# Patient Record
Sex: MALE | Race: WHITE | NOT HISPANIC OR LATINO | Employment: FULL TIME | URBAN - METROPOLITAN AREA
[De-identification: names, ages, dates, MRNs, and addresses within clinical notes are randomized per-mention and may not be internally consistent; named-entity substitution may affect disease eponyms.]

---

## 2020-01-17 ENCOUNTER — OFFICE VISIT CONVERTED (OUTPATIENT)
Dept: CARDIOLOGY | Facility: CLINIC | Age: 55
End: 2020-01-17
Attending: INTERNAL MEDICINE

## 2020-01-17 ENCOUNTER — CONVERSION ENCOUNTER (OUTPATIENT)
Dept: CARDIOLOGY | Facility: CLINIC | Age: 55
End: 2020-01-17
Attending: INTERNAL MEDICINE

## 2020-02-14 ENCOUNTER — OFFICE VISIT CONVERTED (OUTPATIENT)
Dept: FAMILY MEDICINE CLINIC | Facility: CLINIC | Age: 55
End: 2020-02-14
Attending: INTERNAL MEDICINE

## 2020-02-14 ENCOUNTER — HOSPITAL ENCOUNTER (OUTPATIENT)
Dept: GENERAL RADIOLOGY | Facility: HOSPITAL | Age: 55
Discharge: HOME | End: 2020-02-14
Attending: INTERNAL MEDICINE

## 2020-02-15 ENCOUNTER — HOSPITAL ENCOUNTER (OUTPATIENT)
Dept: OTHER | Facility: HOSPITAL | Age: 55
Discharge: HOME | End: 2020-02-15
Attending: INTERNAL MEDICINE

## 2020-02-15 LAB
ALBUMIN SERPL-MCNC: 4.2 G/DL (ref 3.5–5)
ALBUMIN/GLOB SERPL: 1.3 {RATIO} (ref 1.4–2.6)
ALP SERPL-CCNC: 83 U/L (ref 56–119)
ALT SERPL-CCNC: 16 U/L (ref 10–40)
ANION GAP SERPL CALC-SCNC: 16 MMOL/L (ref 8–19)
AST SERPL-CCNC: 16 U/L (ref 15–50)
BASOPHILS # BLD AUTO: 0.06 10*3/UL (ref 0–0.2)
BASOPHILS NFR BLD AUTO: 0.6 % (ref 0–3)
BILIRUB SERPL-MCNC: 0.53 MG/DL (ref 0.2–1.3)
BUN SERPL-MCNC: 18 MG/DL (ref 5–25)
BUN/CREAT SERPL: 15 {RATIO} (ref 6–20)
CALCIUM SERPL-MCNC: 9.3 MG/DL (ref 8.7–10.4)
CHLORIDE SERPL-SCNC: 99 MMOL/L (ref 99–111)
CHOLEST SERPL-MCNC: 252 MG/DL (ref 107–200)
CHOLEST/HDLC SERPL: 8.7 {RATIO} (ref 3–6)
CONV ABS IMM GRAN: 0.02 10*3/UL (ref 0–0.2)
CONV CO2: 25 MMOL/L (ref 22–32)
CONV IMMATURE GRAN: 0.2 % (ref 0–1.8)
CONV TOTAL PROTEIN: 7.4 G/DL (ref 6.3–8.2)
CREAT UR-MCNC: 1.23 MG/DL (ref 0.7–1.2)
DEPRECATED RDW RBC AUTO: 40.6 FL (ref 35.1–43.9)
EOSINOPHIL # BLD AUTO: 0.15 10*3/UL (ref 0–0.7)
EOSINOPHIL # BLD AUTO: 1.4 % (ref 0–7)
ERYTHROCYTE [DISTWIDTH] IN BLOOD BY AUTOMATED COUNT: 13.1 % (ref 11.6–14.4)
EST. AVERAGE GLUCOSE BLD GHB EST-MCNC: 111 MG/DL
GFR SERPLBLD BASED ON 1.73 SQ M-ARVRAT: >60 ML/MIN/{1.73_M2}
GLOBULIN UR ELPH-MCNC: 3.2 G/DL (ref 2–3.5)
GLUCOSE SERPL-MCNC: 85 MG/DL (ref 70–99)
HBA1C MFR BLD: 5.5 % (ref 3.5–5.7)
HCT VFR BLD AUTO: 47 % (ref 42–52)
HDLC SERPL-MCNC: 29 MG/DL (ref 40–60)
HGB BLD-MCNC: 15.9 G/DL (ref 14–18)
LDLC SERPL CALC-MCNC: 171 MG/DL (ref 70–100)
LYMPHOCYTES # BLD AUTO: 2.99 10*3/UL (ref 1–5)
LYMPHOCYTES NFR BLD AUTO: 27.9 % (ref 20–45)
MCH RBC QN AUTO: 28.8 PG (ref 27–31)
MCHC RBC AUTO-ENTMCNC: 33.8 G/DL (ref 33–37)
MCV RBC AUTO: 85.1 FL (ref 80–96)
MONOCYTES # BLD AUTO: 0.86 10*3/UL (ref 0.2–1.2)
MONOCYTES NFR BLD AUTO: 8 % (ref 3–10)
NEUTROPHILS # BLD AUTO: 6.62 10*3/UL (ref 2–8)
NEUTROPHILS NFR BLD AUTO: 61.9 % (ref 30–85)
NRBC CBCN: 0 % (ref 0–0.7)
OSMOLALITY SERPL CALC.SUM OF ELEC: 283 MOSM/KG (ref 273–304)
PLATELET # BLD AUTO: 323 10*3/UL (ref 130–400)
PMV BLD AUTO: 9.2 FL (ref 9.4–12.4)
POTASSIUM SERPL-SCNC: 4.3 MMOL/L (ref 3.5–5.3)
RBC # BLD AUTO: 5.52 10*6/UL (ref 4.7–6.1)
SODIUM SERPL-SCNC: 136 MMOL/L (ref 135–147)
T4 FREE SERPL-MCNC: 0.9 NG/DL (ref 0.9–1.8)
TRIGL SERPL-MCNC: 259 MG/DL (ref 40–150)
TSH SERPL-ACNC: 2.49 M[IU]/L (ref 0.27–4.2)
VLDLC SERPL-MCNC: 52 MG/DL (ref 5–37)
WBC # BLD AUTO: 10.7 10*3/UL (ref 4.8–10.8)

## 2020-02-16 LAB — TESTOST SERPL-MCNC: 553 NG/DL (ref 193–740)

## 2020-02-17 LAB — TESTOSTERONE, FREE: 10.8 PG/ML (ref 7.2–24)

## 2020-02-20 ENCOUNTER — CONVERSION ENCOUNTER (OUTPATIENT)
Dept: FAMILY MEDICINE CLINIC | Facility: CLINIC | Age: 55
End: 2020-02-20

## 2020-02-20 ENCOUNTER — OFFICE VISIT CONVERTED (OUTPATIENT)
Dept: FAMILY MEDICINE CLINIC | Facility: CLINIC | Age: 55
End: 2020-02-20
Attending: INTERNAL MEDICINE

## 2020-03-02 ENCOUNTER — OFFICE VISIT (OUTPATIENT)
Dept: CARDIOLOGY | Facility: CLINIC | Age: 55
End: 2020-03-02

## 2020-03-02 ENCOUNTER — TRANSCRIBE ORDERS (OUTPATIENT)
Dept: CARDIOLOGY | Facility: CLINIC | Age: 55
End: 2020-03-02

## 2020-03-02 ENCOUNTER — LAB (OUTPATIENT)
Dept: LAB | Facility: HOSPITAL | Age: 55
End: 2020-03-02

## 2020-03-02 VITALS
HEIGHT: 67 IN | BODY MASS INDEX: 30.35 KG/M2 | SYSTOLIC BLOOD PRESSURE: 144 MMHG | HEART RATE: 55 BPM | WEIGHT: 193.4 LBS | DIASTOLIC BLOOD PRESSURE: 98 MMHG

## 2020-03-02 DIAGNOSIS — Z01.810 PREPROCEDURAL CARDIOVASCULAR EXAMINATION: ICD-10-CM

## 2020-03-02 DIAGNOSIS — I20.0 UNSTABLE ANGINA (HCC): ICD-10-CM

## 2020-03-02 DIAGNOSIS — I20.0 UNSTABLE ANGINA (HCC): Primary | ICD-10-CM

## 2020-03-02 DIAGNOSIS — Z13.6 SCREENING FOR CARDIOVASCULAR CONDITION: Primary | ICD-10-CM

## 2020-03-02 DIAGNOSIS — Z72.0 TOBACCO ABUSE: ICD-10-CM

## 2020-03-02 DIAGNOSIS — Z13.6 SCREENING FOR CARDIOVASCULAR CONDITION: ICD-10-CM

## 2020-03-02 DIAGNOSIS — I10 ESSENTIAL HYPERTENSION: ICD-10-CM

## 2020-03-02 DIAGNOSIS — E78.5 HYPERLIPIDEMIA LDL GOAL <70: ICD-10-CM

## 2020-03-02 LAB
ANION GAP SERPL CALCULATED.3IONS-SCNC: 11.1 MMOL/L (ref 5–15)
BASOPHILS # BLD AUTO: 0.05 10*3/MM3 (ref 0–0.2)
BASOPHILS NFR BLD AUTO: 0.5 % (ref 0–1.5)
BUN BLD-MCNC: 11 MG/DL (ref 6–20)
BUN/CREAT SERPL: 9.8 (ref 7–25)
CALCIUM SPEC-SCNC: 9.2 MG/DL (ref 8.6–10.5)
CHLORIDE SERPL-SCNC: 99 MMOL/L (ref 98–107)
CO2 SERPL-SCNC: 25.9 MMOL/L (ref 22–29)
CREAT BLD-MCNC: 1.12 MG/DL (ref 0.76–1.27)
DEPRECATED RDW RBC AUTO: 40.5 FL (ref 37–54)
EOSINOPHIL # BLD AUTO: 0.12 10*3/MM3 (ref 0–0.4)
EOSINOPHIL NFR BLD AUTO: 1.2 % (ref 0.3–6.2)
ERYTHROCYTE [DISTWIDTH] IN BLOOD BY AUTOMATED COUNT: 13.1 % (ref 12.3–15.4)
GFR SERPL CREATININE-BSD FRML MDRD: 68 ML/MIN/1.73
GLUCOSE BLD-MCNC: 96 MG/DL (ref 65–99)
HCT VFR BLD AUTO: 40.6 % (ref 37.5–51)
HGB BLD-MCNC: 14.5 G/DL (ref 13–17.7)
IMM GRANULOCYTES # BLD AUTO: 0.03 10*3/MM3 (ref 0–0.05)
IMM GRANULOCYTES NFR BLD AUTO: 0.3 % (ref 0–0.5)
LYMPHOCYTES # BLD AUTO: 2.96 10*3/MM3 (ref 0.7–3.1)
LYMPHOCYTES NFR BLD AUTO: 30 % (ref 19.6–45.3)
MCH RBC QN AUTO: 30 PG (ref 26.6–33)
MCHC RBC AUTO-ENTMCNC: 35.7 G/DL (ref 31.5–35.7)
MCV RBC AUTO: 84.1 FL (ref 79–97)
MONOCYTES # BLD AUTO: 0.81 10*3/MM3 (ref 0.1–0.9)
MONOCYTES NFR BLD AUTO: 8.2 % (ref 5–12)
NEUTROPHILS # BLD AUTO: 5.89 10*3/MM3 (ref 1.7–7)
NEUTROPHILS NFR BLD AUTO: 59.8 % (ref 42.7–76)
NRBC BLD AUTO-RTO: 0 /100 WBC (ref 0–0.2)
PLATELET # BLD AUTO: 309 10*3/MM3 (ref 140–450)
PMV BLD AUTO: 9.1 FL (ref 6–12)
POTASSIUM BLD-SCNC: 4.8 MMOL/L (ref 3.5–5.2)
RBC # BLD AUTO: 4.83 10*6/MM3 (ref 4.14–5.8)
SODIUM BLD-SCNC: 136 MMOL/L (ref 136–145)
WBC NRBC COR # BLD: 9.86 10*3/MM3 (ref 3.4–10.8)

## 2020-03-02 PROCEDURE — 80048 BASIC METABOLIC PNL TOTAL CA: CPT

## 2020-03-02 PROCEDURE — 36415 COLL VENOUS BLD VENIPUNCTURE: CPT

## 2020-03-02 PROCEDURE — 85025 COMPLETE CBC W/AUTO DIFF WBC: CPT

## 2020-03-02 PROCEDURE — 99204 OFFICE O/P NEW MOD 45 MIN: CPT | Performed by: INTERNAL MEDICINE

## 2020-03-02 PROCEDURE — 93000 ELECTROCARDIOGRAM COMPLETE: CPT | Performed by: INTERNAL MEDICINE

## 2020-03-02 RX ORDER — METOPROLOL SUCCINATE 25 MG/1
25 TABLET, EXTENDED RELEASE ORAL DAILY
COMMUNITY
Start: 2020-02-16 | End: 2020-03-15 | Stop reason: HOSPADM

## 2020-03-02 RX ORDER — BUPROPION HYDROCHLORIDE 100 MG/1
100 TABLET ORAL 3 TIMES DAILY
COMMUNITY
Start: 2020-01-18 | End: 2020-12-16 | Stop reason: ALTCHOICE

## 2020-03-02 RX ORDER — ATORVASTATIN CALCIUM 20 MG/1
20 TABLET, FILM COATED ORAL DAILY
Status: ON HOLD | COMMUNITY
Start: 2020-02-20 | End: 2020-03-15 | Stop reason: SDUPTHER

## 2020-03-02 RX ORDER — NITROGLYCERIN 0.4 MG/1
0.4 TABLET SUBLINGUAL
COMMUNITY
Start: 2020-01-18 | End: 2020-03-15 | Stop reason: HOSPADM

## 2020-03-02 NOTE — H&P (VIEW-ONLY)
Prosper Darling  1965  Date of Office Visit: 03/02/20  Encounter Provider: Carlos Aparicio MD  Place of Service: [facility] CARDIOLOGY      CHIEF COMPLAINT:Chest pain.  Syncope.  Tobacco abuse.        HISTORY OF PRESENT ILLNESS:I had the pleasure of seeing this patient in consultation today. He is a 54-year-old gentleman with a medical history of hypertension, hyperlipidemia, tobacco abuse who presents to me for chest pain. He states that over the past month he has noticed progressive chest discomfort that he describes as central, pressure-like and moderate-to-severe in intensity. It comes on now with walking around 50 feet. Previously this took much more exertion to come on than it does currently. He states that this will last for about 2-3 minutes after he takes a break and stops but it does resolve without taking nitroglycerin.    He also has complained of left-sided throat tightness along with facial tingling and a loss of consciousness recently that came on suddenly. He denied any activity prior to this episode.        Review of Systems   Constitution: Negative for fever and malaise/fatigue.   HENT: Negative for nosebleeds and sore throat.    Eyes: Negative for blurred vision and double vision.   Cardiovascular: Positive for chest pain and syncope. Negative for claudication and palpitations.   Respiratory: Negative for cough, shortness of breath and snoring.    Endocrine: Negative for cold intolerance, heat intolerance and polydipsia.   Skin: Negative for itching, poor wound healing and rash.   Musculoskeletal: Negative for joint pain, joint swelling, muscle weakness and myalgias.   Gastrointestinal: Negative for abdominal pain, melena, nausea and vomiting.   Neurological: Negative for light-headedness, loss of balance, seizures, vertigo and weakness.   Psychiatric/Behavioral: Negative for altered mental status and depression.          Past Medical History:   Diagnosis  "Date   • Erectile dysfunction    • Hypertension    • Peripheral neuropathy        The following portions of the patient's history were reviewed and updated as appropriate: Social history , Family history and Surgical history     Current Outpatient Medications on File Prior to Visit   Medication Sig Dispense Refill   • aspirin 81 MG tablet Take 81 mg by mouth Daily.     • atorvastatin (LIPITOR) 20 MG tablet Take  by mouth Daily.     • buPROPion (WELLBUTRIN) 100 MG tablet Take 100 mg by mouth Daily.     • metoprolol succinate XL (TOPROL-XL) 25 MG 24 hr tablet TAKE 1 TABLET BY MOUTH EVERY 24 HOURS     • nitroglycerin (NITROSTAT) 0.4 MG SL tablet Place 0.4 mg under the tongue Every 5 (Five) Minutes As Needed. do not exceed a total of 3 doses in 15 minutes       No current facility-administered medications on file prior to visit.        No Known Allergies    Vitals:    03/02/20 1252   BP: 144/98   BP Location: Left arm   Patient Position: Sitting   Pulse: 55   Weight: 87.7 kg (193 lb 6.4 oz)   Height: 170.2 cm (67\")     Physical Exam   Constitutional: He is oriented to person, place, and time. He appears well-developed and well-nourished.   HENT:   Head: Normocephalic and atraumatic.   Eyes: Conjunctivae and EOM are normal. No scleral icterus.   Neck: Normal range of motion. Neck supple. Normal carotid pulses, no hepatojugular reflux and no JVD present. Carotid bruit is not present. No tracheal deviation present. No thyromegaly present.   Cardiovascular: Normal rate and regular rhythm. Exam reveals no gallop and no friction rub.   No murmur heard.  Pulses:       Carotid pulses are 2+ on the right side, and 2+ on the left side.       Radial pulses are 2+ on the right side, and 2+ on the left side.        Femoral pulses are 2+ on the right side, and 2+ on the left side.       Dorsalis pedis pulses are 2+ on the right side, and 2+ on the left side.        Posterior tibial pulses are 2+ on the right side, and 2+ on the left " side.   Pulmonary/Chest: Breath sounds normal. No respiratory distress. He has no decreased breath sounds. He has no wheezes. He has no rhonchi. He has no rales. He exhibits no tenderness.   Abdominal: Soft. Bowel sounds are normal. He exhibits no distension. There is no tenderness. There is no rebound.   Musculoskeletal: He exhibits no edema or deformity.   Neurological: He is alert and oriented to person, place, and time. He has normal strength. No sensory deficit.   Skin: No rash noted. No erythema.   Psychiatric: He has a normal mood and affect. His behavior is normal.         ECG 12 Lead  Date/Time: 3/2/2020 1:05 PM  Performed by: Carlos Aparicio MD  Authorized by: Carlos Aparicio MD   Comparison: compared with previous ECG   Rhythm: sinus rhythm  Rate: normal  QRS axis: normal    Clinical impression: non-specific ECG  Comments: Nonspec. T wave abnormality lateral                  DISCUSSION/SUMMARYVery pleasant 54-year-old male with a medical history of unstable angina with a medical history of essential hypertension, tobacco abuse, hyperlipidemia, and chest discomfort that has been progressively increasing in frequency and intensity over the past few months. He is now having chest pain with minimal levels of exertion, including walking around 30 feet. He states that this will resolve with rest; however, is taking a little longer to resolve.    I do think he has unstable angina, and in the setting of his risk factors including hypertension, hyperlipidemia, and tobacco abuse, I think we should move forward with catheterization. He is tolerating metoprolol tartrate with no alteration in his anginal frequency.    1.  Unstable angina.   -  Continue aspirin, statin, and beta blockade.   -  Catheterization has been recommended secondary to rapid progression of the pain.  2.  Hyperlipidemia: As above.   -  Continue atorvastatin 2-0 mg p.o. nightly. If he does have CAD, I would recommend increasing this  to 40 mg daily.  3.  Essential hypertension. Mildly elevated here today. I will hold off on up titration of therapy at this point in time.

## 2020-03-02 NOTE — PROGRESS NOTES
Prosper Darling  1965  Date of Office Visit: 03/02/20  Encounter Provider: Carlos Aparicio MD  Place of Service: [facility] CARDIOLOGY      CHIEF COMPLAINT:Chest pain.  Syncope.  Tobacco abuse.        HISTORY OF PRESENT ILLNESS:I had the pleasure of seeing this patient in consultation today. He is a 54-year-old gentleman with a medical history of hypertension, hyperlipidemia, tobacco abuse who presents to me for chest pain. He states that over the past month he has noticed progressive chest discomfort that he describes as central, pressure-like and moderate-to-severe in intensity. It comes on now with walking around 50 feet. Previously this took much more exertion to come on than it does currently. He states that this will last for about 2-3 minutes after he takes a break and stops but it does resolve without taking nitroglycerin.    He also has complained of left-sided throat tightness along with facial tingling and a loss of consciousness recently that came on suddenly. He denied any activity prior to this episode.        Review of Systems   Constitution: Negative for fever and malaise/fatigue.   HENT: Negative for nosebleeds and sore throat.    Eyes: Negative for blurred vision and double vision.   Cardiovascular: Positive for chest pain and syncope. Negative for claudication and palpitations.   Respiratory: Negative for cough, shortness of breath and snoring.    Endocrine: Negative for cold intolerance, heat intolerance and polydipsia.   Skin: Negative for itching, poor wound healing and rash.   Musculoskeletal: Negative for joint pain, joint swelling, muscle weakness and myalgias.   Gastrointestinal: Negative for abdominal pain, melena, nausea and vomiting.   Neurological: Negative for light-headedness, loss of balance, seizures, vertigo and weakness.   Psychiatric/Behavioral: Negative for altered mental status and depression.          Past Medical History:   Diagnosis  "Date   • Erectile dysfunction    • Hypertension    • Peripheral neuropathy        The following portions of the patient's history were reviewed and updated as appropriate: Social history , Family history and Surgical history     Current Outpatient Medications on File Prior to Visit   Medication Sig Dispense Refill   • aspirin 81 MG tablet Take 81 mg by mouth Daily.     • atorvastatin (LIPITOR) 20 MG tablet Take  by mouth Daily.     • buPROPion (WELLBUTRIN) 100 MG tablet Take 100 mg by mouth Daily.     • metoprolol succinate XL (TOPROL-XL) 25 MG 24 hr tablet TAKE 1 TABLET BY MOUTH EVERY 24 HOURS     • nitroglycerin (NITROSTAT) 0.4 MG SL tablet Place 0.4 mg under the tongue Every 5 (Five) Minutes As Needed. do not exceed a total of 3 doses in 15 minutes       No current facility-administered medications on file prior to visit.        No Known Allergies    Vitals:    03/02/20 1252   BP: 144/98   BP Location: Left arm   Patient Position: Sitting   Pulse: 55   Weight: 87.7 kg (193 lb 6.4 oz)   Height: 170.2 cm (67\")     Physical Exam   Constitutional: He is oriented to person, place, and time. He appears well-developed and well-nourished.   HENT:   Head: Normocephalic and atraumatic.   Eyes: Conjunctivae and EOM are normal. No scleral icterus.   Neck: Normal range of motion. Neck supple. Normal carotid pulses, no hepatojugular reflux and no JVD present. Carotid bruit is not present. No tracheal deviation present. No thyromegaly present.   Cardiovascular: Normal rate and regular rhythm. Exam reveals no gallop and no friction rub.   No murmur heard.  Pulses:       Carotid pulses are 2+ on the right side, and 2+ on the left side.       Radial pulses are 2+ on the right side, and 2+ on the left side.        Femoral pulses are 2+ on the right side, and 2+ on the left side.       Dorsalis pedis pulses are 2+ on the right side, and 2+ on the left side.        Posterior tibial pulses are 2+ on the right side, and 2+ on the left " side.   Pulmonary/Chest: Breath sounds normal. No respiratory distress. He has no decreased breath sounds. He has no wheezes. He has no rhonchi. He has no rales. He exhibits no tenderness.   Abdominal: Soft. Bowel sounds are normal. He exhibits no distension. There is no tenderness. There is no rebound.   Musculoskeletal: He exhibits no edema or deformity.   Neurological: He is alert and oriented to person, place, and time. He has normal strength. No sensory deficit.   Skin: No rash noted. No erythema.   Psychiatric: He has a normal mood and affect. His behavior is normal.         ECG 12 Lead  Date/Time: 3/2/2020 1:05 PM  Performed by: Carlos Aparicio MD  Authorized by: Carlos Aparicio MD   Comparison: compared with previous ECG   Rhythm: sinus rhythm  Rate: normal  QRS axis: normal    Clinical impression: non-specific ECG  Comments: Nonspec. T wave abnormality lateral                  DISCUSSION/SUMMARYVery pleasant 54-year-old male with a medical history of unstable angina with a medical history of essential hypertension, tobacco abuse, hyperlipidemia, and chest discomfort that has been progressively increasing in frequency and intensity over the past few months. He is now having chest pain with minimal levels of exertion, including walking around 30 feet. He states that this will resolve with rest; however, is taking a little longer to resolve.    I do think he has unstable angina, and in the setting of his risk factors including hypertension, hyperlipidemia, and tobacco abuse, I think we should move forward with catheterization. He is tolerating metoprolol tartrate with no alteration in his anginal frequency.    1.  Unstable angina.   -  Continue aspirin, statin, and beta blockade.   -  Catheterization has been recommended secondary to rapid progression of the pain.  2.  Hyperlipidemia: As above.   -  Continue atorvastatin 2-0 mg p.o. nightly. If he does have CAD, I would recommend increasing this  to 40 mg daily.  3.  Essential hypertension. Mildly elevated here today. I will hold off on up titration of therapy at this point in time.

## 2020-03-06 ENCOUNTER — PREP FOR SURGERY (OUTPATIENT)
Dept: OTHER | Facility: HOSPITAL | Age: 55
End: 2020-03-06

## 2020-03-06 ENCOUNTER — HOSPITAL ENCOUNTER (OUTPATIENT)
Facility: HOSPITAL | Age: 55
Setting detail: HOSPITAL OUTPATIENT SURGERY
Discharge: HOME | End: 2020-03-06
Attending: INTERNAL MEDICINE | Admitting: INTERNAL MEDICINE

## 2020-03-06 VITALS
BODY MASS INDEX: 30.61 KG/M2 | SYSTOLIC BLOOD PRESSURE: 149 MMHG | HEART RATE: 67 BPM | WEIGHT: 195 LBS | HEIGHT: 67 IN | RESPIRATION RATE: 16 BRPM | OXYGEN SATURATION: 95 % | DIASTOLIC BLOOD PRESSURE: 75 MMHG | TEMPERATURE: 97.4 F

## 2020-03-06 DIAGNOSIS — I10 ESSENTIAL HYPERTENSION: ICD-10-CM

## 2020-03-06 DIAGNOSIS — I20.0 UNSTABLE ANGINA (HCC): ICD-10-CM

## 2020-03-06 DIAGNOSIS — Z72.0 TOBACCO ABUSE: Primary | ICD-10-CM

## 2020-03-06 DIAGNOSIS — I25.118 CORONARY ARTERY DISEASE OF NATIVE HEART WITH STABLE ANGINA PECTORIS, UNSPECIFIED VESSEL OR LESION TYPE (HCC): Primary | ICD-10-CM

## 2020-03-06 PROCEDURE — C1769 GUIDE WIRE: HCPCS | Performed by: INTERNAL MEDICINE

## 2020-03-06 PROCEDURE — 25010000002 FENTANYL CITRATE (PF) 100 MCG/2ML SOLUTION: Performed by: INTERNAL MEDICINE

## 2020-03-06 PROCEDURE — 99152 MOD SED SAME PHYS/QHP 5/>YRS: CPT | Performed by: INTERNAL MEDICINE

## 2020-03-06 PROCEDURE — 25010000002 HEPARIN (PORCINE) PER 1000 UNITS: Performed by: INTERNAL MEDICINE

## 2020-03-06 PROCEDURE — C1894 INTRO/SHEATH, NON-LASER: HCPCS | Performed by: INTERNAL MEDICINE

## 2020-03-06 PROCEDURE — 93458 L HRT ARTERY/VENTRICLE ANGIO: CPT | Performed by: INTERNAL MEDICINE

## 2020-03-06 PROCEDURE — 0 IOPAMIDOL PER 1 ML: Performed by: INTERNAL MEDICINE

## 2020-03-06 PROCEDURE — 25010000002 MIDAZOLAM PER 1 MG: Performed by: INTERNAL MEDICINE

## 2020-03-06 RX ORDER — SODIUM CHLORIDE 9 MG/ML
100 INJECTION, SOLUTION INTRAVENOUS CONTINUOUS
Status: DISCONTINUED | OUTPATIENT
Start: 2020-03-06 | End: 2020-03-06 | Stop reason: HOSPADM

## 2020-03-06 RX ORDER — ONDANSETRON 4 MG/1
4 TABLET, FILM COATED ORAL EVERY 6 HOURS PRN
Status: DISCONTINUED | OUTPATIENT
Start: 2020-03-06 | End: 2020-03-06 | Stop reason: HOSPADM

## 2020-03-06 RX ORDER — CHLORHEXIDINE GLUCONATE 500 MG/1
1 CLOTH TOPICAL EVERY 12 HOURS PRN
Status: CANCELLED | OUTPATIENT
Start: 2020-03-11

## 2020-03-06 RX ORDER — ONDANSETRON 2 MG/ML
4 INJECTION INTRAMUSCULAR; INTRAVENOUS EVERY 6 HOURS PRN
Status: DISCONTINUED | OUTPATIENT
Start: 2020-03-06 | End: 2020-03-06 | Stop reason: HOSPADM

## 2020-03-06 RX ORDER — NALOXONE HCL 0.4 MG/ML
0.4 VIAL (ML) INJECTION
Status: DISCONTINUED | OUTPATIENT
Start: 2020-03-06 | End: 2020-03-06 | Stop reason: HOSPADM

## 2020-03-06 RX ORDER — LOSARTAN POTASSIUM 50 MG/1
50 TABLET ORAL DAILY
Status: ON HOLD | COMMUNITY
End: 2020-03-15 | Stop reason: SDUPTHER

## 2020-03-06 RX ORDER — CHLORHEXIDINE GLUCONATE 0.12 MG/ML
15 RINSE ORAL EVERY 12 HOURS
Status: CANCELLED | OUTPATIENT
Start: 2020-03-06 | End: 2020-03-07

## 2020-03-06 RX ORDER — ACETAMINOPHEN 325 MG/1
650 TABLET ORAL EVERY 4 HOURS PRN
Status: DISCONTINUED | OUTPATIENT
Start: 2020-03-06 | End: 2020-03-06 | Stop reason: HOSPADM

## 2020-03-06 RX ORDER — SODIUM CHLORIDE 0.9 % (FLUSH) 0.9 %
3 SYRINGE (ML) INJECTION EVERY 12 HOURS SCHEDULED
Status: DISCONTINUED | OUTPATIENT
Start: 2020-03-06 | End: 2020-03-06 | Stop reason: HOSPADM

## 2020-03-06 RX ORDER — LIDOCAINE HYDROCHLORIDE 20 MG/ML
INJECTION, SOLUTION INFILTRATION; PERINEURAL AS NEEDED
Status: DISCONTINUED | OUTPATIENT
Start: 2020-03-06 | End: 2020-03-06 | Stop reason: HOSPADM

## 2020-03-06 RX ORDER — MORPHINE SULFATE 2 MG/ML
1 INJECTION, SOLUTION INTRAMUSCULAR; INTRAVENOUS EVERY 4 HOURS PRN
Status: DISCONTINUED | OUTPATIENT
Start: 2020-03-06 | End: 2020-03-06 | Stop reason: HOSPADM

## 2020-03-06 RX ORDER — SODIUM CHLORIDE 0.9 % (FLUSH) 0.9 %
10 SYRINGE (ML) INJECTION AS NEEDED
Status: DISCONTINUED | OUTPATIENT
Start: 2020-03-06 | End: 2020-03-06 | Stop reason: HOSPADM

## 2020-03-06 RX ORDER — CEFAZOLIN SODIUM 2 G/100ML
2 INJECTION, SOLUTION INTRAVENOUS
Status: CANCELLED | OUTPATIENT
Start: 2020-03-11 | End: 2020-03-12

## 2020-03-06 RX ORDER — SODIUM CHLORIDE 9 MG/ML
75 INJECTION, SOLUTION INTRAVENOUS CONTINUOUS
Status: DISCONTINUED | OUTPATIENT
Start: 2020-03-06 | End: 2020-03-06 | Stop reason: HOSPADM

## 2020-03-06 RX ORDER — LIDOCAINE HYDROCHLORIDE 10 MG/ML
0.1 INJECTION, SOLUTION EPIDURAL; INFILTRATION; INTRACAUDAL; PERINEURAL ONCE AS NEEDED
Status: DISCONTINUED | OUTPATIENT
Start: 2020-03-06 | End: 2020-03-06 | Stop reason: HOSPADM

## 2020-03-06 RX ORDER — MIDAZOLAM HYDROCHLORIDE 1 MG/ML
INJECTION INTRAMUSCULAR; INTRAVENOUS AS NEEDED
Status: DISCONTINUED | OUTPATIENT
Start: 2020-03-06 | End: 2020-03-06 | Stop reason: HOSPADM

## 2020-03-06 RX ORDER — ATORVASTATIN CALCIUM 20 MG/1
40 TABLET, FILM COATED ORAL NIGHTLY
Status: DISCONTINUED | OUTPATIENT
Start: 2020-03-06 | End: 2020-03-06 | Stop reason: HOSPADM

## 2020-03-06 RX ORDER — FAMOTIDINE 20 MG/1
20 TABLET, FILM COATED ORAL 2 TIMES DAILY
COMMUNITY
End: 2020-03-23

## 2020-03-06 RX ORDER — CHLORHEXIDINE GLUCONATE 0.12 MG/ML
15 RINSE ORAL ONCE
Status: CANCELLED | OUTPATIENT
Start: 2020-03-11 | End: 2020-03-06

## 2020-03-06 RX ORDER — FENTANYL CITRATE 50 UG/ML
INJECTION, SOLUTION INTRAMUSCULAR; INTRAVENOUS AS NEEDED
Status: DISCONTINUED | OUTPATIENT
Start: 2020-03-06 | End: 2020-03-06 | Stop reason: HOSPADM

## 2020-03-06 RX ORDER — HYDROCODONE BITARTRATE AND ACETAMINOPHEN 5; 325 MG/1; MG/1
1 TABLET ORAL EVERY 4 HOURS PRN
Status: DISCONTINUED | OUTPATIENT
Start: 2020-03-06 | End: 2020-03-06 | Stop reason: HOSPADM

## 2020-03-06 RX ORDER — CHLORHEXIDINE GLUCONATE 500 MG/1
1 CLOTH TOPICAL EVERY 12 HOURS PRN
Status: CANCELLED | OUTPATIENT
Start: 2020-03-06

## 2020-03-06 RX ORDER — VIT B/MIN/SAW PALMETTO/PYGEUM 160-12.5MG
1 CAPSULE ORAL DAILY
COMMUNITY
End: 2020-03-10

## 2020-03-06 RX ADMIN — SODIUM CHLORIDE 75 ML/HR: 9 INJECTION, SOLUTION INTRAVENOUS at 09:24

## 2020-03-06 NOTE — DISCHARGE INSTRUCTIONS
[facility]  [address] [place] [address]    Coronary Angiogram (Radial/Ulnar Approach) After Care    Refer to this sheet in the next few weeks. These instructions provide you with information on caring for yourself after your procedure. Your caregiver may also give you more specific instructions. Your treatment has been planned according to current medical practices, but problems sometimes occur. Call your caregiver if you have any problems or questions after your procedure.    Home Care Instructions:  · You may shower the day after the procedure. Remove the bandage (dressing) and gently wash the site with plain soap and water. Gently pat the site dry. You may apply a band aid daily for 2 days if desired.    · Do not apply powder or lotion to the site.  · Do not submerge the affected site in water for 3 to 5 days or until the site is completely healed.   · Do not lift, push or pull anything over 10 pounds for 2 days after your procedure.  · Inspect the site at least twice daily. You may notice some bruising at the site and it may be tender for 1 to 2 weeks.     · Increase your fluid intake for the next 2 days.    · Keep arm elevated for 24 hours. For the remainder of the day, keep your arm in “Pledge of Allegiance” position when up and about.     · You may drive 24 hours after the procedure unless otherwise instructed by your caregiver.  · Do not operate machinery or power tools for 24 hours.  · A responsible adult should be with you for the first 24 hours after you arrive home. Do not make any important legal decisions or sign legal papers for 24 hours.  Do not drink alcohol for 24 hours.        Call Your Doctor if:   · You have unusual pain at the radial/ulnar (wrist) site.  · You have redness, warmth, swelling, or pain at the radial/ulnar (wrist) site.  · You have drainage (other than a small amount of blood on the dressing).  · You have chills or a fever > 101.  · Your arm becomes pale or  dark, cool, tingly, or numb.  · You have heavy bleeding from the site, hold pressure on the site for 20 minutes.  If the bleeding stops, apply a fresh bandage and call your cardiologist.  However, if you continue to have bleeding, call 911.

## 2020-03-06 NOTE — CONSULTS
Patient Care Team:  Robert Rapp DO as PCP - General (Internal Medicine)    Chief complaint: chest pain    Subjective     History of Present Illness   Mr. Darling is 54 year old male with a past medical history of hypertension, hyperlipidemia, and tobacco abuse who was evaluated by Dr. Aparicio for chest pain.  He describes progressively worsening chest pain he describes as pressure.  The pressure occurs after very little activity.  Nitro alleviates the pain. He denies any associated symptoms.  He underwent a cardiac catheterization which revealed severe multivessel coronary disease.  We were consulted for cardiac surgery evaluation.      Review of Systems   Constitutional: Positive for activity change and fatigue.   HENT: Negative.    Eyes: Negative.    Respiratory: Positive for chest tightness and shortness of breath.    Cardiovascular: Positive for chest pain.   Gastrointestinal: Negative.    Endocrine: Negative.    Genitourinary: Negative.    Musculoskeletal: Negative.    Skin: Negative.    Allergic/Immunologic: Negative.    Neurological: Positive for dizziness, syncope and light-headedness.   Hematological: Negative.    Psychiatric/Behavioral: Negative.         Past Medical History:   Diagnosis Date   • Erectile dysfunction    • Hyperlipidemia    • Hypertension    • Peripheral neuropathy      Past Surgical History:   Procedure Laterality Date   • APPENDECTOMY       History reviewed. No pertinent family history.  Social History     Tobacco Use   • Smoking status: Current Every Day Smoker     Packs/day: 1.00     Years: 41.00     Pack years: 41.00   • Smokeless tobacco: Never Used   • Tobacco comment: caffeine use    Substance Use Topics   • Alcohol use: Yes     Alcohol/week: 0.0 - 1.0 standard drinks     Frequency: Monthly or less     Drinks per session: 1 or 2     Comment: occas.   • Drug use: Never     Medications Prior to Admission   Medication Sig Dispense Refill Last Dose   • aspirin 81 MG tablet  "Take 81 mg by mouth Daily.   3/5/2020 at Unknown time   • atorvastatin (LIPITOR) 20 MG tablet Take 20 mg by mouth Daily.   3/5/2020 at Unknown time   • buPROPion (WELLBUTRIN) 100 MG tablet Take 100 mg by mouth Daily.   3/5/2020 at Unknown time   • famotidine (PEPCID) 20 MG tablet Take 20 mg by mouth 2 (Two) Times a Day.   3/5/2020 at Unknown time   • losartan (COZAAR) 50 MG tablet Take 50 mg by mouth Daily.   3/5/2020 at Unknown time   • metoprolol succinate XL (TOPROL-XL) 25 MG 24 hr tablet Take 25 mg by mouth Daily.   3/5/2020 at Unknown time   • Misc Natural Products (URINOZINC PLUS) tablet Take 1 tablet by mouth Daily.   3/5/2020 at Unknown time   • nitroglycerin (NITROSTAT) 0.4 MG SL tablet Place 0.4 mg under the tongue Every 5 (Five) Minutes As Needed. do not exceed a total of 3 doses in 15 minutes   NEVER        Allergies:  Patient has no known allergies.    Objective      Vital Signs  Temp:  [97.4 °F (36.3 °C)] 97.4 °F (36.3 °C)  Heart Rate:  [60] 60  Resp:  [17-18] 17  BP: (164)/(81) 164/81    Flowsheet Rows      First Filed Value   Admission Height  170.2 cm (67\") Documented at 03/06/2020 0904   Admission Weight  88.5 kg (195 lb) Documented at 03/06/2020 0904        170.2 cm (67\")    Physical Exam   Constitutional: He is oriented to person, place, and time. He appears well-developed and well-nourished. No distress.   HENT:   Head: Normocephalic and atraumatic.   Nose: Nose normal.   Mouth/Throat: Oropharynx is clear and moist. No oropharyngeal exudate.   Eyes: Pupils are equal, round, and reactive to light. Conjunctivae and EOM are normal. No scleral icterus.   Neck: Normal range of motion. Neck supple. No JVD present.   Pulmonary/Chest: Effort normal and breath sounds normal. No respiratory distress.   Abdominal: Soft. Bowel sounds are normal. He exhibits no distension and no mass.   Musculoskeletal: Normal range of motion. He exhibits no edema.   Neurological: He is alert and oriented to person, place, " and time. No cranial nerve deficit.   Skin: Skin is warm and dry. He is not diaphoretic. No erythema.       Results Review:   Lab Results (last 24 hours)     ** No results found for the last 24 hours. **              Assessment/Plan       Unstable angina (CMS/[facility])      Assessment & Plan    -Unstable angina, MV CAD  -Hyperlipidemia  -Hypertension  -Tobacco abuse    Dr. Orourke reviewed films and recommends surgery.  We will plan on Wednesday. Our office will call to set up PAT.      Thank you for the opportunity to participate in this patient's care      JAIME Ortez  03/06/20  12:07 PM

## 2020-03-09 ENCOUNTER — TELEPHONE (OUTPATIENT)
Dept: CARDIAC SURGERY | Facility: CLINIC | Age: 55
End: 2020-03-09

## 2020-03-09 NOTE — TELEPHONE ENCOUNTER
Spoke to patient and his wife with surgery and PAT times. He is to report to the Respiratory Department  By 0730 on 3- with all other testing to follow. Surgery is scheduled for 3- with an arrival time of 0500. He and his wife both expressed a verbal understanding of these dates and times. They were instructed to call the office with any further questions.

## 2020-03-10 ENCOUNTER — HOSPITAL ENCOUNTER (OUTPATIENT)
Dept: CARDIOLOGY | Facility: HOSPITAL | Age: 55
Discharge: HOME | End: 2020-03-10

## 2020-03-10 ENCOUNTER — APPOINTMENT (OUTPATIENT)
Dept: PREADMISSION TESTING | Facility: HOSPITAL | Age: 55
End: 2020-03-10

## 2020-03-10 ENCOUNTER — ANESTHESIA EVENT (OUTPATIENT)
Dept: PERIOP | Facility: HOSPITAL | Age: 55
End: 2020-03-10

## 2020-03-10 ENCOUNTER — HOSPITAL ENCOUNTER (OUTPATIENT)
Dept: RESPIRATORY THERAPY | Facility: HOSPITAL | Age: 55
Discharge: HOME | End: 2020-03-10

## 2020-03-10 ENCOUNTER — HOSPITAL ENCOUNTER (OUTPATIENT)
Dept: GENERAL RADIOLOGY | Facility: HOSPITAL | Age: 55
Discharge: HOME | End: 2020-03-10

## 2020-03-10 ENCOUNTER — HOSPITAL ENCOUNTER (OUTPATIENT)
Dept: CARDIOLOGY | Facility: HOSPITAL | Age: 55
Discharge: HOME | End: 2020-03-10
Admitting: NURSE PRACTITIONER

## 2020-03-10 VITALS
DIASTOLIC BLOOD PRESSURE: 76 MMHG | HEART RATE: 56 BPM | RESPIRATION RATE: 18 BRPM | TEMPERATURE: 97.8 F | HEIGHT: 68 IN | OXYGEN SATURATION: 96 % | BODY MASS INDEX: 28.7 KG/M2 | WEIGHT: 189.38 LBS | SYSTOLIC BLOOD PRESSURE: 132 MMHG

## 2020-03-10 VITALS — OXYGEN SATURATION: 97 %

## 2020-03-10 DIAGNOSIS — I25.118 CORONARY ARTERY DISEASE OF NATIVE HEART WITH STABLE ANGINA PECTORIS, UNSPECIFIED VESSEL OR LESION TYPE (HCC): ICD-10-CM

## 2020-03-10 DIAGNOSIS — Z01.811 PRE-OPERATIVE RESPIRATORY EXAMINATION: Primary | ICD-10-CM

## 2020-03-10 LAB
ABO GROUP BLD: NORMAL
ALBUMIN SERPL-MCNC: 4.1 G/DL (ref 3.5–5.2)
ALBUMIN/GLOB SERPL: 1.3 G/DL
ALP SERPL-CCNC: 92 U/L (ref 39–117)
ALT SERPL W P-5'-P-CCNC: 17 U/L (ref 1–41)
ANION GAP SERPL CALCULATED.3IONS-SCNC: 11.3 MMOL/L (ref 5–15)
APTT PPP: 42.8 SECONDS (ref 22.7–35.4)
ARTERIAL PATENCY WRIST A: NORMAL
AST SERPL-CCNC: 16 U/L (ref 1–40)
ATMOSPHERIC PRESS: 754 MMHG
BASE EXCESS BLDA CALC-SCNC: 1.2 MMOL/L (ref 0–2)
BASOPHILS # BLD AUTO: 0.05 10*3/MM3 (ref 0–0.2)
BASOPHILS NFR BLD AUTO: 0.5 % (ref 0–1.5)
BDY SITE: NORMAL
BH CV XLRA MEAS - DIST GSV CALF DIST LEFT: 0.17 CM
BH CV XLRA MEAS - DIST GSV CALF DIST RIGHT: 0.17 CM
BH CV XLRA MEAS - DIST GSV THIGH DIST LEFT: 0.2 CM
BH CV XLRA MEAS - DIST GSV THIGH DIST RIGHT: 0.24 CM
BH CV XLRA MEAS - GSV ANKLE DIST LEFT: 0.18 CM
BH CV XLRA MEAS - GSV ANKLE DIST RIGHT: 0.19 CM
BH CV XLRA MEAS - GSV KNEE DIST LEFT: 0.21 CM
BH CV XLRA MEAS - GSV KNEE DIST RIGHT: 0.24 CM
BH CV XLRA MEAS - GSV ORIGIN DIST LEFT: 0.71 CM
BH CV XLRA MEAS - GSV ORIGIN DIST RIGHT: 0.64 CM
BH CV XLRA MEAS - MID GSV CALF LEFT: 0.16 CM
BH CV XLRA MEAS - MID GSV CALF RIGHT: 0.17 CM
BH CV XLRA MEAS - MID GSV THIGH  LEFT: 0.25 CM
BH CV XLRA MEAS - MID GSV THIGH  RIGHT: 0.22 CM
BH CV XLRA MEAS - PROX GSV CALF DIST LEFT: 0.22 CM
BH CV XLRA MEAS - PROX GSV CALF DIST RIGHT: 0.21 CM
BH CV XLRA MEAS - PROX GSV THIGH  LEFT: 0.3 CM
BH CV XLRA MEAS - PROX GSV THIGH  RIGHT: 0.36 CM
BH CV XLRA MEAS LEFT CCA RATIO VEL: -94.4 CM/SEC
BH CV XLRA MEAS LEFT DIST CCA EDV: -21.7 CM/SEC
BH CV XLRA MEAS LEFT DIST CCA PSV: -94.4 CM/SEC
BH CV XLRA MEAS LEFT DIST ICA EDV: -24.1 CM/SEC
BH CV XLRA MEAS LEFT DIST ICA PSV: -86.7 CM/SEC
BH CV XLRA MEAS LEFT ICA RATIO VEL: -87.3 CM/SEC
BH CV XLRA MEAS LEFT ICA/CCA RATIO: 0.92
BH CV XLRA MEAS LEFT MID ICA EDV: -23.6 CM/SEC
BH CV XLRA MEAS LEFT MID ICA PSV: -73.5 CM/SEC
BH CV XLRA MEAS LEFT PROX CCA EDV: 17.4 CM/SEC
BH CV XLRA MEAS LEFT PROX CCA PSV: 97.5 CM/SEC
BH CV XLRA MEAS LEFT PROX ECA EDV: -9.3 CM/SEC
BH CV XLRA MEAS LEFT PROX ECA PSV: -93.8 CM/SEC
BH CV XLRA MEAS LEFT PROX ICA EDV: -17.6 CM/SEC
BH CV XLRA MEAS LEFT PROX ICA PSV: -87.3 CM/SEC
BH CV XLRA MEAS LEFT PROX SCLA EDV: -21.1 CM/SEC
BH CV XLRA MEAS LEFT PROX SCLA PSV: -78.6 CM/SEC
BH CV XLRA MEAS LEFT VERTEBRAL A EDV: -18.4 CM/SEC
BH CV XLRA MEAS LEFT VERTEBRAL A PSV: -65 CM/SEC
BH CV XLRA MEAS RIGHT CCA RATIO VEL: -96 CM/SEC
BH CV XLRA MEAS RIGHT DIST CCA EDV: -24.7 CM/SEC
BH CV XLRA MEAS RIGHT DIST CCA PSV: -96 CM/SEC
BH CV XLRA MEAS RIGHT DIST ICA EDV: -25.8 CM/SEC
BH CV XLRA MEAS RIGHT DIST ICA PSV: -81.2 CM/SEC
BH CV XLRA MEAS RIGHT ICA RATIO VEL: -92.7 CM/SEC
BH CV XLRA MEAS RIGHT ICA/CCA RATIO: 0.97
BH CV XLRA MEAS RIGHT MID ICA EDV: -30.7 CM/SEC
BH CV XLRA MEAS RIGHT MID ICA PSV: -87.3 CM/SEC
BH CV XLRA MEAS RIGHT PROX CCA EDV: 21.4 CM/SEC
BH CV XLRA MEAS RIGHT PROX CCA PSV: 82.3 CM/SEC
BH CV XLRA MEAS RIGHT PROX ECA EDV: -13.2 CM/SEC
BH CV XLRA MEAS RIGHT PROX ECA PSV: -85.6 CM/SEC
BH CV XLRA MEAS RIGHT PROX ICA EDV: -22.5 CM/SEC
BH CV XLRA MEAS RIGHT PROX ICA PSV: -92.7 CM/SEC
BH CV XLRA MEAS RIGHT PROX SCLA PSV: 233.6 CM/SEC
BH CV XLRA MEAS RIGHT VERTEBRAL A EDV: -19.7 CM/SEC
BH CV XLRA MEAS RIGHT VERTEBRAL A PSV: -68.8 CM/SEC
BILIRUB SERPL-MCNC: 0.8 MG/DL (ref 0.2–1.2)
BILIRUB UR QL STRIP: NEGATIVE
BLD GP AB SCN SERPL QL: NEGATIVE
BUN BLD-MCNC: 14 MG/DL (ref 6–20)
BUN/CREAT SERPL: 13.5 (ref 7–25)
CALCIUM SPEC-SCNC: 9.6 MG/DL (ref 8.6–10.5)
CHLORIDE SERPL-SCNC: 104 MMOL/L (ref 98–107)
CHOLEST SERPL-MCNC: 171 MG/DL (ref 0–200)
CLARITY UR: CLEAR
CLOSE TME COLL+ADP + EPINEP PNL BLD: 99 %
CO2 SERPL-SCNC: 24.7 MMOL/L (ref 22–29)
COLOR UR: YELLOW
CREAT BLD-MCNC: 1.04 MG/DL (ref 0.76–1.27)
DEPRECATED RDW RBC AUTO: 40 FL (ref 37–54)
EOSINOPHIL # BLD AUTO: 0.17 10*3/MM3 (ref 0–0.4)
EOSINOPHIL NFR BLD AUTO: 1.7 % (ref 0.3–6.2)
ERYTHROCYTE [DISTWIDTH] IN BLOOD BY AUTOMATED COUNT: 13.1 % (ref 12.3–15.4)
GFR SERPL CREATININE-BSD FRML MDRD: 74 ML/MIN/1.73
GLOBULIN UR ELPH-MCNC: 3.2 GM/DL
GLUCOSE BLD-MCNC: 109 MG/DL (ref 65–99)
GLUCOSE UR STRIP-MCNC: NEGATIVE MG/DL
HBA1C MFR BLD: 5.5 % (ref 4.8–5.6)
HCO3 BLDA-SCNC: 26 MMOL/L (ref 22–28)
HCT VFR BLD AUTO: 43.1 % (ref 37.5–51)
HDLC SERPL-MCNC: 27 MG/DL (ref 40–60)
HGB BLD-MCNC: 14.8 G/DL (ref 13–17.7)
HGB UR QL STRIP.AUTO: NEGATIVE
IMM GRANULOCYTES # BLD AUTO: 0.03 10*3/MM3 (ref 0–0.05)
IMM GRANULOCYTES NFR BLD AUTO: 0.3 % (ref 0–0.5)
INR PPP: 0.96 (ref 0.9–1.1)
KETONES UR QL STRIP: NEGATIVE
LDLC SERPL CALC-MCNC: 105 MG/DL (ref 0–100)
LDLC/HDLC SERPL: 3.9 {RATIO}
LEFT ARM BP: NORMAL MMHG
LEUKOCYTE ESTERASE UR QL STRIP.AUTO: NEGATIVE
LYMPHOCYTES # BLD AUTO: 2.3 10*3/MM3 (ref 0.7–3.1)
LYMPHOCYTES NFR BLD AUTO: 22.7 % (ref 19.6–45.3)
MAGNESIUM SERPL-MCNC: 2.1 MG/DL (ref 1.6–2.6)
MCH RBC QN AUTO: 29 PG (ref 26.6–33)
MCHC RBC AUTO-ENTMCNC: 34.3 G/DL (ref 31.5–35.7)
MCV RBC AUTO: 84.5 FL (ref 79–97)
MODALITY: NORMAL
MONOCYTES # BLD AUTO: 0.83 10*3/MM3 (ref 0.1–0.9)
MONOCYTES NFR BLD AUTO: 8.2 % (ref 5–12)
NEUTROPHILS # BLD AUTO: 6.75 10*3/MM3 (ref 1.7–7)
NEUTROPHILS NFR BLD AUTO: 66.6 % (ref 42.7–76)
NITRITE UR QL STRIP: NEGATIVE
NRBC BLD AUTO-RTO: 0 /100 WBC (ref 0–0.2)
NT-PROBNP SERPL-MCNC: 83.5 PG/ML (ref 5–900)
PCO2 BLDA: 41.3 MM HG (ref 35–45)
PEEP RESPIRATORY: 22 CM[H2O]
PH BLDA: 7.41 PH UNITS (ref 7.35–7.45)
PH UR STRIP.AUTO: 6 [PH] (ref 5–8)
PLATELET # BLD AUTO: 343 10*3/MM3 (ref 140–450)
PMV BLD AUTO: 8.8 FL (ref 6–12)
PO2 BLDA: 80.1 MM HG (ref 80–100)
POTASSIUM BLD-SCNC: 4.3 MMOL/L (ref 3.5–5.2)
PROT SERPL-MCNC: 7.3 G/DL (ref 6–8.5)
PROT UR QL STRIP: NEGATIVE
PROTHROMBIN TIME: 12.5 SECONDS (ref 11.7–14.2)
RBC # BLD AUTO: 5.1 10*6/MM3 (ref 4.14–5.8)
RH BLD: POSITIVE
RIGHT ARM BP: NORMAL MMHG
SAO2 % BLDCOA: 95.8 % (ref 92–99)
SODIUM BLD-SCNC: 140 MMOL/L (ref 136–145)
SP GR UR STRIP: 1.01 (ref 1–1.03)
T&S EXPIRATION DATE: NORMAL
TRIGL SERPL-MCNC: 193 MG/DL (ref 0–150)
UROBILINOGEN UR QL STRIP: NORMAL
VLDLC SERPL-MCNC: 38.6 MG/DL (ref 5–40)
WBC NRBC COR # BLD: 10.13 10*3/MM3 (ref 3.4–10.8)

## 2020-03-10 PROCEDURE — 86900 BLOOD TYPING SEROLOGIC ABO: CPT | Performed by: NURSE PRACTITIONER

## 2020-03-10 PROCEDURE — 83880 ASSAY OF NATRIURETIC PEPTIDE: CPT | Performed by: NURSE PRACTITIONER

## 2020-03-10 PROCEDURE — 85025 COMPLETE CBC W/AUTO DIFF WBC: CPT | Performed by: NURSE PRACTITIONER

## 2020-03-10 PROCEDURE — 36600 WITHDRAWAL OF ARTERIAL BLOOD: CPT

## 2020-03-10 PROCEDURE — 83735 ASSAY OF MAGNESIUM: CPT | Performed by: NURSE PRACTITIONER

## 2020-03-10 PROCEDURE — 85610 PROTHROMBIN TIME: CPT | Performed by: NURSE PRACTITIONER

## 2020-03-10 PROCEDURE — 94799 UNLISTED PULMONARY SVC/PX: CPT

## 2020-03-10 PROCEDURE — 86901 BLOOD TYPING SEROLOGIC RH(D): CPT | Performed by: NURSE PRACTITIONER

## 2020-03-10 PROCEDURE — 36415 COLL VENOUS BLD VENIPUNCTURE: CPT

## 2020-03-10 PROCEDURE — 93010 ELECTROCARDIOGRAM REPORT: CPT | Performed by: INTERNAL MEDICINE

## 2020-03-10 PROCEDURE — 93970 EXTREMITY STUDY: CPT

## 2020-03-10 PROCEDURE — 85576 BLOOD PLATELET AGGREGATION: CPT | Performed by: NURSE PRACTITIONER

## 2020-03-10 PROCEDURE — 93005 ELECTROCARDIOGRAM TRACING: CPT

## 2020-03-10 PROCEDURE — 86920 COMPATIBILITY TEST SPIN: CPT

## 2020-03-10 PROCEDURE — 80061 LIPID PANEL: CPT | Performed by: NURSE PRACTITIONER

## 2020-03-10 PROCEDURE — 85730 THROMBOPLASTIN TIME PARTIAL: CPT | Performed by: NURSE PRACTITIONER

## 2020-03-10 PROCEDURE — 86850 RBC ANTIBODY SCREEN: CPT | Performed by: NURSE PRACTITIONER

## 2020-03-10 PROCEDURE — 71046 X-RAY EXAM CHEST 2 VIEWS: CPT

## 2020-03-10 PROCEDURE — 81003 URINALYSIS AUTO W/O SCOPE: CPT | Performed by: NURSE PRACTITIONER

## 2020-03-10 PROCEDURE — 82803 BLOOD GASES ANY COMBINATION: CPT

## 2020-03-10 PROCEDURE — 80053 COMPREHEN METABOLIC PANEL: CPT | Performed by: NURSE PRACTITIONER

## 2020-03-10 PROCEDURE — 94010 BREATHING CAPACITY TEST: CPT

## 2020-03-10 PROCEDURE — 93880 EXTRACRANIAL BILAT STUDY: CPT

## 2020-03-10 PROCEDURE — 83036 HEMOGLOBIN GLYCOSYLATED A1C: CPT | Performed by: NURSE PRACTITIONER

## 2020-03-10 RX ORDER — ALBUTEROL SULFATE 2.5 MG/3ML
2.5 SOLUTION RESPIRATORY (INHALATION) ONCE
Status: DISCONTINUED | OUTPATIENT
Start: 2020-03-10 | End: 2020-03-11 | Stop reason: HOSPADM

## 2020-03-10 RX ORDER — CHLORHEXIDINE GLUCONATE 0.12 MG/ML
15 RINSE ORAL EVERY 12 HOURS
Status: DISPENSED | OUTPATIENT
Start: 2020-03-10 | End: 2020-03-11

## 2020-03-10 NOTE — ANESTHESIA PREPROCEDURE EVALUATION
Anesthesia Evaluation     Patient summary reviewed and Nursing notes reviewed   history of anesthetic complications: prolonged sedation               Airway   Mallampati: II  TM distance: >3 FB  Neck ROM: full  No difficulty expected  Dental    (+) upper dentures        Pulmonary - normal exam   (+) a smoker Current,   Cardiovascular - normal exam    (+) hypertension, CAD, angina with exertion, hyperlipidemia,       Neuro/Psych  (+) syncope, numbness,       ROS Comment: Peripheral neuropathy  GI/Hepatic/Renal/Endo    (+)  GERD,      Musculoskeletal     Abdominal  - normal exam   Substance History      OB/GYN          Other                      Anesthesia Plan    ASA 4     general   (Art line/CVC/SGC/SHARRI/post-op vent)  intravenous induction   Postoperative Plan: Expected vent after surgery  Anesthetic plan, all risks, benefits, and alternatives have been provided, discussed and informed consent has been obtained with: patient.

## 2020-03-10 NOTE — H&P
Patient Care Team:  Robert Rapp DO as PCP - General (Internal Medicine)     Chief complaint: chest pain        Subjective         History of Present Illness   Mr. Darling is 54 year old male with a past medical history of hypertension, hyperlipidemia, and tobacco abuse who was evaluated by Dr. Aparicio for chest pain.  He describes progressively worsening chest pain he describes as pressure.  The pressure occurs after very little activity.  Nitro alleviates the pain. He denies any associated symptoms.  He underwent a cardiac catheterization which revealed severe multivessel coronary disease.  We were consulted for cardiac surgery evaluation.        Review of Systems   Constitutional: Positive for activity change and fatigue.   HENT: Negative.    Eyes: Negative.    Respiratory: Positive for chest tightness and shortness of breath.    Cardiovascular: Positive for chest pain.   Gastrointestinal: Negative.    Endocrine: Negative.    Genitourinary: Negative.    Musculoskeletal: Negative.    Skin: Negative.    Allergic/Immunologic: Negative.    Neurological: Positive for dizziness, syncope and light-headedness.   Hematological: Negative.    Psychiatric/Behavioral: Negative.          Medical History        Past Medical History:   Diagnosis Date   • Erectile dysfunction     • Hyperlipidemia     • Hypertension     • Peripheral neuropathy           Surgical History         Past Surgical History:   Procedure Laterality Date   • APPENDECTOMY             History reviewed. No pertinent family history.  Social History      Tobacco Use   • Smoking status: Current Every Day Smoker       Packs/day: 1.00       Years: 41.00       Pack years: 41.00   • Smokeless tobacco: Never Used   • Tobacco comment: caffeine use    Substance Use Topics   • Alcohol use: Yes       Alcohol/week: 0.0 - 1.0 standard drinks       Frequency: Monthly or less       Drinks per session: 1 or 2       Comment: occas.   • Drug use: Never  "     Prescriptions Prior to Admission           Medications Prior to Admission   Medication Sig Dispense Refill Last Dose   • aspirin 81 MG tablet Take 81 mg by mouth Daily.     3/5/2020 at Unknown time   • atorvastatin (LIPITOR) 20 MG tablet Take 20 mg by mouth Daily.     3/5/2020 at Unknown time   • buPROPion (WELLBUTRIN) 100 MG tablet Take 100 mg by mouth Daily.     3/5/2020 at Unknown time   • famotidine (PEPCID) 20 MG tablet Take 20 mg by mouth 2 (Two) Times a Day.     3/5/2020 at Unknown time   • losartan (COZAAR) 50 MG tablet Take 50 mg by mouth Daily.     3/5/2020 at Unknown time   • metoprolol succinate XL (TOPROL-XL) 25 MG 24 hr tablet Take 25 mg by mouth Daily.     3/5/2020 at Unknown time   • Misc Natural Products (URINOZINC PLUS) tablet Take 1 tablet by mouth Daily.     3/5/2020 at Unknown time   • nitroglycerin (NITROSTAT) 0.4 MG SL tablet Place 0.4 mg under the tongue Every 5 (Five) Minutes As Needed. do not exceed a total of 3 doses in 15 minutes     NEVER         Allergies:  Patient has no known allergies.        Objective          Vital Signs  Temp:  [97.4 °F (36.3 °C)] 97.4 °F (36.3 °C)  Heart Rate:  [60] 60  Resp:  [17-18] 17  BP: (164)/(81) 164/81         Flowsheet Rows      First Filed Value   Admission Height  170.2 cm (67\") Documented at 03/06/2020 0904   Admission Weight  88.5 kg (195 lb) Documented at 03/06/2020 0904          170.2 cm (67\")     Physical Exam   Constitutional: He is oriented to person, place, and time. He appears well-developed and well-nourished. No distress.   HENT:   Head: Normocephalic and atraumatic.   Nose: Nose normal.   Mouth/Throat: Oropharynx is clear and moist. No oropharyngeal exudate.   Eyes: Pupils are equal, round, and reactive to light. Conjunctivae and EOM are normal. No scleral icterus.   Neck: Normal range of motion. Neck supple. No JVD present.   Pulmonary/Chest: Effort normal and breath sounds normal. No respiratory distress.   Abdominal: Soft. Bowel " sounds are normal. He exhibits no distension and no mass.   Musculoskeletal: Normal range of motion. He exhibits no edema.   Neurological: He is alert and oriented to person, place, and time. No cranial nerve deficit.   Skin: Skin is warm and dry. He is not diaphoretic. No erythema.         Results Review:       Lab Results (last 24 hours)      ** No results found for the last 24 hours. **                      Assessment/Plan           Unstable angina (CMS/[facility])        Assessment & Plan     -Unstable angina, MV CAD  -Hyperlipidemia  -Hypertension  -Tobacco abuse     Dr. Orourke reviewed films and recommends surgery.  We will plan on Wednesday. [facility]        Thank you for the opportunity to participate in this patient's care        Marguerite HollandJAIME  03/06/20  12:07 PM                  Cosigned by: Jr Manuel Orourke MD at 03/06/20 2402       Addendum:   [facility] the [facility] where he is undergoing preoperative testing for his upcoming cardiac surgery. He was last seen in the hospital following his cardiac cath. He denies any changes to his medical record since then. I have personally reviewed his preoperative labs, which look good. The official report for the CXR is pending. His vein mapping and carotid doppler is also pending. His pulmonary function tests are suggestive of obstructive disease, will discuss with Dr. Orourke. Plan for surgery on 3/11/20 with Dr. Orourke. Questions answered with verbalized understanding.

## 2020-03-10 NOTE — DISCHARGE INSTRUCTIONS
Arrive day of surgery at 5:00 AM TO MAIN SURGERY        Take the following medications the morning of surgery:    PER DR ALEJANDRE    General Instructions:  • Do not eat solid food after midnight the night before surgery.  • You may drink clear liquids day of surgery but must stop at least one hour before your hospital arrival time.  • It is beneficial for you to have a clear drink that contains carbohydrates the day of surgery.  We suggest a 12 to 20 ounce bottle of Gatorade or Powerade for non-diabetic patients or a 12 to 20 ounce bottle of G2 or Powerade Zero for diabetic patients. (Pediatric patients, are not advised to drink a 12 to 20 ounce carbohydrate drink)    Clear liquids are liquids you can see through.  Nothing red in color.     Plain water                               Sports drinks  Sodas                                   Gelatin (Jell-O)  Fruit juices without pulp such as white grape juice and apple juice  Popsicles that contain no fruit or yogurt  Tea or coffee (no cream or milk added)  Gatorade / Powerade  G2 / Powerade Zero    • Infants may have breast milk up to four hours before surgery.  • Infants drinking formula may drink formula up to six hours before surgery.   • Patients who avoid smoking, chewing tobacco and alcohol for 4 weeks prior to surgery have a reduced risk of post-operative complications.  Quit smoking as many days before surgery as you can.  • Do not smoke, use chewing tobacco or drink alcohol the day of surgery.   • If applicable bring your C-PAP/ BI-PAP machine.  • Bring any papers given to you in the doctor’s office.  • Wear clean comfortable clothes.  • Do not wear contact lenses, false eyelashes or make-up.  Bring a case for your glasses.   • Bring crutches or walker if applicable.  • Remove all piercings.  Leave jewelry and any other valuables at home.  • Hair extensions with metal clips must be removed prior to surgery.  • The Pre-Admission Testing nurse will instruct you  to bring medications if unable to obtain an accurate list in Pre-Admission Testing.        If you were given a blood bank ID arm band remember to bring it with you the day of surgery.    Preventing a Surgical Site Infection:  • For 2 to 3 days before surgery, avoid shaving with a razor because the razor can irritate skin and make it easier to develop an infection.    • Any areas of open skin can increase the risk of a post-operative wound infection by allowing bacteria to enter and travel throughout the body.  Notify your surgeon if you have any skin wounds / rashes even if it is not near the expected surgical site.  The area will need assessed to determine if surgery should be delayed until it is healed.  • The night prior to surgery shower using a fresh bar of anti-bacterial soap (such as Dial) and clean washcloth.  Sleep in a clean bed with clean clothing.  Do not allow pets to sleep with you.  • Shower on the morning of surgery using a fresh bar of anti-bacterial soap (such as Dial) and clean washcloth.  Dry with a clean towel and dress in clean clothing.  • Ask your surgeon if you will be receiving antibiotics prior to surgery.  • Make sure you, your family, and all healthcare providers clean their hands with soap and water or an alcohol based hand  before caring for you or your wound.    Day of surgery:  Your arrival time is approximately two hours before your scheduled surgery time.  Upon arrival, a Pre-op nurse and Anesthesiologist will review your health history, obtain vital signs, and answer questions you may have.  The only belongings needed at this time will be a list of your home medications and if applicable your C-PAP/BI-PAP machine.  If you are staying overnight your family can leave the rest of your belongings in the car and bring them to your room later.  A Pre-op nurse will start an IV and you may receive medication in preparation for surgery, including something to help you relax.  Your  family will be able to see you in the Pre-op area.  Two visitors at a time will be allowed in the Pre-op room.  While you are in surgery your family should notify the waiting room  if they leave the waiting room area and provide a contact phone number.    Please be aware that surgery does come with discomfort.  We want to make every effort to control your discomfort so please discuss any uncontrolled symptoms with your nurse.   Your doctor will most likely have prescribed pain medications.      If you are going home after surgery you will receive individualized written care instructions before being discharged.  A responsible adult must drive you to and from the hospital on the day of your surgery and stay with you for 24 hours.    If you are staying overnight following surgery, you will be transported to your hospital room following the recovery period.  [facility] has all private rooms.    If you have any questions please call Pre-Admission Testing at [phone].  Deductibles and co-payments are collected on the day of service. Please be prepared to pay the required co-pay, deductible or deposit on the day of service as defined by your plan.    CHLORHEXIDINE CLOTH INSTRUCTIONS  The morning of surgery follow these instructions using the Chlorhexidine cloths you've been given.  These steps reduce bacteria on the body.  Do not use the cloths near your eyes, ears mouth, genitalia or on open wounds.  Throw the cloths away after use but do not try to flush them down a toilet.      • Open and remove one cloth at a time from the package.    • Leave the cloth unfolded and begin the bathing.  • Massage the skin with the cloths using gentle pressure to remove bacteria.  Do not scrub harshly.   • Follow the steps below with one 2% CHG cloth per area (6 total cloths).  • One cloth for neck, shoulders and chest.  • One cloth for both arms, hands, fingers and underarms (do underarms last).  • One  cloth for the abdomen followed by groin.  • One cloth for right leg and foot including between the toes.  • One cloth for left leg and foot including between the toes.  • The last cloth is to be used for the back of the neck, back and buttocks.    Allow the CHG to air dry 3 minutes on the skin which will give it time to work and decrease the chance of irritation.  The skin may feel sticky until it is dry.  Do not rinse with water or any other liquid or you will lose the beneficial effects of the CHG.  If mild skin irritation occurs, do rinse the skin to remove the CHG.  Report this to the nurse at time of admission.  Do not apply lotions, creams, ointments, deodorants or perfumes after using the clothes. Dress in clean clothes before coming to the hospital.    BACTROBAN NASAL OINTMENT  There are many germs normally in your nose. Bactroban is an ointment that will help reduce these germs. Please follow these instructions for Bactroban use:        ____The day before surgery in the evening              Date________    ____The day of surgery in the morning    Date________    **Squirt ½ package of Bactroban Ointment onto a cotton applicator and apply to inside of 1st nostril.  Squirt the remaining Bactroban and apply to the inside of the other nostril.    PERIDEX- ORAL:  Use only if your surgeon has ordered  Use the night before and morning of surgery - Swish, gargle, and spit - do not swallow.

## 2020-03-11 ENCOUNTER — APPOINTMENT (OUTPATIENT)
Dept: GENERAL RADIOLOGY | Facility: HOSPITAL | Age: 55
End: 2020-03-11

## 2020-03-11 ENCOUNTER — ANESTHESIA (OUTPATIENT)
Dept: PERIOP | Facility: HOSPITAL | Age: 55
End: 2020-03-11

## 2020-03-11 ENCOUNTER — ANCILLARY PROCEDURE (OUTPATIENT)
Dept: PERIOP | Facility: HOSPITAL | Age: 55
End: 2020-03-11

## 2020-03-11 ENCOUNTER — HOSPITAL ENCOUNTER (INPATIENT)
Facility: HOSPITAL | Age: 55
LOS: 4 days | Discharge: HOME HEALTH | End: 2020-03-15
Attending: THORACIC SURGERY (CARDIOTHORACIC VASCULAR SURGERY) | Admitting: THORACIC SURGERY (CARDIOTHORACIC VASCULAR SURGERY)

## 2020-03-11 DIAGNOSIS — I25.118 CORONARY ARTERY DISEASE OF NATIVE HEART WITH STABLE ANGINA PECTORIS, UNSPECIFIED VESSEL OR LESION TYPE (HCC): ICD-10-CM

## 2020-03-11 DIAGNOSIS — Z95.1 S/P CABG X 5: ICD-10-CM

## 2020-03-11 DIAGNOSIS — Z95.1 S/P CABG (CORONARY ARTERY BYPASS GRAFT): Primary | ICD-10-CM

## 2020-03-11 LAB
ACT BLD: 125 SECONDS (ref 82–152)
ACT BLD: 153 SECONDS (ref 82–152)
ACT BLD: 439 SECONDS (ref 82–152)
ACT BLD: 510 SECONDS (ref 82–152)
ACT BLD: 549 SECONDS (ref 82–152)
ACT BLD: 588 SECONDS (ref 82–152)
ALBUMIN SERPL-MCNC: 4.3 G/DL (ref 3.5–5.2)
ALBUMIN SERPL-MCNC: 4.8 G/DL (ref 3.5–5.2)
ANION GAP SERPL CALCULATED.3IONS-SCNC: 11.5 MMOL/L (ref 5–15)
ANION GAP SERPL CALCULATED.3IONS-SCNC: 12.4 MMOL/L (ref 5–15)
APTT PPP: 37.2 SECONDS (ref 22.7–35.4)
ARTERIAL PATENCY WRIST A: POSITIVE
ATMOSPHERIC PRESS: 751.2 MMHG
ATMOSPHERIC PRESS: 752 MMHG
ATMOSPHERIC PRESS: 753.6 MMHG
BASE EXCESS BLDA CALC-SCNC: 1.1 MMOL/L (ref 0–2)
BASE EXCESS BLDA CALC-SCNC: 2 MMOL/L (ref 0–2)
BASE EXCESS BLDA CALC-SCNC: 2.8 MMOL/L (ref 0–2)
BASOPHILS # BLD AUTO: 0.03 10*3/MM3 (ref 0–0.2)
BASOPHILS NFR BLD AUTO: 0.2 % (ref 0–1.5)
BDY SITE: ABNORMAL
BUN BLD-MCNC: 15 MG/DL (ref 6–20)
BUN BLD-MCNC: 15 MG/DL (ref 6–20)
BUN/CREAT SERPL: 10.7 (ref 7–25)
BUN/CREAT SERPL: 11.3 (ref 7–25)
CA-I BLD-MCNC: 5.1 MG/DL (ref 4.6–5.4)
CA-I SERPL ISE-MCNC: 1.28 MMOL/L (ref 1.15–1.35)
CALCIUM SPEC-SCNC: 8.6 MG/DL (ref 8.6–10.5)
CALCIUM SPEC-SCNC: 8.8 MG/DL (ref 8.6–10.5)
CHLORIDE SERPL-SCNC: 105 MMOL/L (ref 98–107)
CHLORIDE SERPL-SCNC: 109 MMOL/L (ref 98–107)
CO2 SERPL-SCNC: 23.6 MMOL/L (ref 22–29)
CO2 SERPL-SCNC: 25.5 MMOL/L (ref 22–29)
CREAT BLD-MCNC: 1.33 MG/DL (ref 0.76–1.27)
CREAT BLD-MCNC: 1.4 MG/DL (ref 0.76–1.27)
DEPRECATED RDW RBC AUTO: 39.4 FL (ref 37–54)
DEPRECATED RDW RBC AUTO: 39.9 FL (ref 37–54)
EOSINOPHIL # BLD AUTO: 0.16 10*3/MM3 (ref 0–0.4)
EOSINOPHIL NFR BLD AUTO: 1.2 % (ref 0.3–6.2)
ERYTHROCYTE [DISTWIDTH] IN BLOOD BY AUTOMATED COUNT: 13.2 % (ref 12.3–15.4)
ERYTHROCYTE [DISTWIDTH] IN BLOOD BY AUTOMATED COUNT: 13.3 % (ref 12.3–15.4)
FIBRINOGEN PPP-MCNC: 356 MG/DL (ref 219–464)
GFR SERPL CREATININE-BSD FRML MDRD: 52 ML/MIN/1.73
GFR SERPL CREATININE-BSD FRML MDRD: 56 ML/MIN/1.73
GLUCOSE BLD-MCNC: 117 MG/DL (ref 65–99)
GLUCOSE BLD-MCNC: 122 MG/DL (ref 65–99)
GLUCOSE BLDC GLUCOMTR-MCNC: 121 MG/DL (ref 70–130)
GLUCOSE BLDC GLUCOMTR-MCNC: 124 MG/DL (ref 70–130)
GLUCOSE BLDC GLUCOMTR-MCNC: 146 MG/DL (ref 70–130)
GLUCOSE BLDC GLUCOMTR-MCNC: 154 MG/DL (ref 70–130)
GLUCOSE BLDC GLUCOMTR-MCNC: 155 MG/DL (ref 70–130)
GLUCOSE BLDC GLUCOMTR-MCNC: 169 MG/DL (ref 70–130)
HCO3 BLDA-SCNC: 27.1 MMOL/L (ref 22–28)
HCO3 BLDA-SCNC: 28.8 MMOL/L (ref 22–28)
HCO3 BLDA-SCNC: 29 MMOL/L (ref 22–28)
HCT VFR BLD AUTO: 34.6 % (ref 37.5–51)
HCT VFR BLD AUTO: 36.9 % (ref 37.5–51)
HGB BLD-MCNC: 11.9 G/DL (ref 13–17.7)
HGB BLD-MCNC: 12.8 G/DL (ref 13–17.7)
HOROWITZ INDEX BLD+IHG-RTO: 100 %
HOROWITZ INDEX BLD+IHG-RTO: 28 %
HOROWITZ INDEX BLD+IHG-RTO: 28 %
IMM GRANULOCYTES # BLD AUTO: 0.07 10*3/MM3 (ref 0–0.05)
IMM GRANULOCYTES NFR BLD AUTO: 0.5 % (ref 0–0.5)
INR PPP: 1.22 (ref 0.9–1.1)
LYMPHOCYTES # BLD AUTO: 2.6 10*3/MM3 (ref 0.7–3.1)
LYMPHOCYTES NFR BLD AUTO: 18.9 % (ref 19.6–45.3)
MAGNESIUM SERPL-MCNC: 2.7 MG/DL (ref 1.6–2.6)
MAGNESIUM SERPL-MCNC: 2.8 MG/DL (ref 1.6–2.6)
MCH RBC QN AUTO: 28.7 PG (ref 26.6–33)
MCH RBC QN AUTO: 28.9 PG (ref 26.6–33)
MCHC RBC AUTO-ENTMCNC: 34.4 G/DL (ref 31.5–35.7)
MCHC RBC AUTO-ENTMCNC: 34.7 G/DL (ref 31.5–35.7)
MCV RBC AUTO: 82.7 FL (ref 79–97)
MCV RBC AUTO: 84 FL (ref 79–97)
MODALITY: ABNORMAL
MONOCYTES # BLD AUTO: 0.66 10*3/MM3 (ref 0.1–0.9)
MONOCYTES NFR BLD AUTO: 4.8 % (ref 5–12)
NEUTROPHILS # BLD AUTO: 10.26 10*3/MM3 (ref 1.7–7)
NEUTROPHILS NFR BLD AUTO: 74.4 % (ref 42.7–76)
NRBC BLD AUTO-RTO: 0 /100 WBC (ref 0–0.2)
O2 A-A PPRESDIFF RESPIRATORY: 0.5 MMHG
PCO2 BLDA: 43.4 MM HG (ref 35–45)
PCO2 BLDA: 49.5 MM HG (ref 35–45)
PCO2 BLDA: 60.5 MM HG (ref 35–45)
PEEP RESPIRATORY: 7.5 CM[H2O]
PEEP RESPIRATORY: 7.5 CM[H2O]
PH BLDA: 7.29 PH UNITS (ref 7.35–7.45)
PH BLDA: 7.37 PH UNITS (ref 7.35–7.45)
PH BLDA: 7.4 PH UNITS (ref 7.35–7.45)
PHOSPHATE SERPL-MCNC: 2.1 MG/DL (ref 2.5–4.5)
PHOSPHATE SERPL-MCNC: 3.3 MG/DL (ref 2.5–4.5)
PLATELET # BLD AUTO: 246 10*3/MM3 (ref 140–450)
PLATELET # BLD AUTO: 255 10*3/MM3 (ref 140–450)
PMV BLD AUTO: 8.8 FL (ref 6–12)
PMV BLD AUTO: 8.9 FL (ref 6–12)
PO2 BLDA: 354.2 MM HG (ref 80–100)
PO2 BLDA: 60.8 MM HG (ref 80–100)
PO2 BLDA: 77.3 MM HG (ref 80–100)
POTASSIUM BLD-SCNC: 4.8 MMOL/L (ref 3.5–5.2)
POTASSIUM BLD-SCNC: 4.8 MMOL/L (ref 3.5–5.2)
PROTHROMBIN TIME: 15.1 SECONDS (ref 11.7–14.2)
PSV: 7 CMH2O
PSV: 7 CMH2O
RBC # BLD AUTO: 4.12 10*6/MM3 (ref 4.14–5.8)
RBC # BLD AUTO: 4.46 10*6/MM3 (ref 4.14–5.8)
SAO2 % BLDCOA: 87.1 % (ref 92–99)
SAO2 % BLDCOA: 94.7 % (ref 92–99)
SAO2 % BLDCOA: 99.9 % (ref 92–99)
SET MECH RESP RATE: 20
SODIUM BLD-SCNC: 141 MMOL/L (ref 136–145)
SODIUM BLD-SCNC: 146 MMOL/L (ref 136–145)
TOTAL RATE: 11 BREATHS/MINUTE
TOTAL RATE: 20 BREATHS/MINUTE
TOTAL RATE: 30 BREATHS/MINUTE
VENTILATOR MODE: ABNORMAL
VT ON VENT VENT: 550 ML
WBC NRBC COR # BLD: 13.78 10*3/MM3 (ref 3.4–10.8)
WBC NRBC COR # BLD: 15.26 10*3/MM3 (ref 3.4–10.8)

## 2020-03-11 PROCEDURE — 80069 RENAL FUNCTION PANEL: CPT | Performed by: THORACIC SURGERY (CARDIOTHORACIC VASCULAR SURGERY)

## 2020-03-11 PROCEDURE — 85027 COMPLETE CBC AUTOMATED: CPT | Performed by: THORACIC SURGERY (CARDIOTHORACIC VASCULAR SURGERY)

## 2020-03-11 PROCEDURE — A4648 IMPLANTABLE TISSUE MARKER: HCPCS | Performed by: THORACIC SURGERY (CARDIOTHORACIC VASCULAR SURGERY)

## 2020-03-11 PROCEDURE — 25010000003 CEFAZOLIN IN DEXTROSE 2-4 GM/100ML-% SOLUTION: Performed by: NURSE PRACTITIONER

## 2020-03-11 PROCEDURE — 25010000002 MAGNESIUM SULFATE IN D5W 1G/100ML (PREMIX) 1-5 GM/100ML-% SOLUTION: Performed by: THORACIC SURGERY (CARDIOTHORACIC VASCULAR SURGERY)

## 2020-03-11 PROCEDURE — 94002 VENT MGMT INPAT INIT DAY: CPT

## 2020-03-11 PROCEDURE — 85384 FIBRINOGEN ACTIVITY: CPT | Performed by: THORACIC SURGERY (CARDIOTHORACIC VASCULAR SURGERY)

## 2020-03-11 PROCEDURE — 33508 ENDOSCOPIC VEIN HARVEST: CPT | Performed by: THORACIC SURGERY (CARDIOTHORACIC VASCULAR SURGERY)

## 2020-03-11 PROCEDURE — 25010000002 PROTAMINE SULFATE PER 10 MG: Performed by: ANESTHESIOLOGY

## 2020-03-11 PROCEDURE — 5A1221Z PERFORMANCE OF CARDIAC OUTPUT, CONTINUOUS: ICD-10-PCS | Performed by: THORACIC SURGERY (CARDIOTHORACIC VASCULAR SURGERY)

## 2020-03-11 PROCEDURE — C1729 CATH, DRAINAGE: HCPCS | Performed by: THORACIC SURGERY (CARDIOTHORACIC VASCULAR SURGERY)

## 2020-03-11 PROCEDURE — 94799 UNLISTED PULMONARY SVC/PX: CPT

## 2020-03-11 PROCEDURE — 25010000002 FENTANYL CITRATE (PF) 100 MCG/2ML SOLUTION: Performed by: ANESTHESIOLOGY

## 2020-03-11 PROCEDURE — 82330 ASSAY OF CALCIUM: CPT | Performed by: THORACIC SURGERY (CARDIOTHORACIC VASCULAR SURGERY)

## 2020-03-11 PROCEDURE — 85014 HEMATOCRIT: CPT

## 2020-03-11 PROCEDURE — 71045 X-RAY EXAM CHEST 1 VIEW: CPT

## 2020-03-11 PROCEDURE — C1713 ANCHOR/SCREW BN/BN,TIS/BN: HCPCS | Performed by: THORACIC SURGERY (CARDIOTHORACIC VASCULAR SURGERY)

## 2020-03-11 PROCEDURE — 86900 BLOOD TYPING SEROLOGIC ABO: CPT

## 2020-03-11 PROCEDURE — 25010000002 NEOSTIGMINE 0.5 MG/ML SOLUTION: Performed by: ANESTHESIOLOGY

## 2020-03-11 PROCEDURE — 25010000002 MAGNESIUM SULFATE PER 500 MG OF MAGNESIUM: Performed by: ANESTHESIOLOGY

## 2020-03-11 PROCEDURE — 25010000002 PAPAVERINE PER 60 MG: Performed by: THORACIC SURGERY (CARDIOTHORACIC VASCULAR SURGERY)

## 2020-03-11 PROCEDURE — 25010000002 MIDAZOLAM PER 1 MG: Performed by: ANESTHESIOLOGY

## 2020-03-11 PROCEDURE — 85347 COAGULATION TIME ACTIVATED: CPT

## 2020-03-11 PROCEDURE — 25010000002 METOCLOPRAMIDE PER 10 MG: Performed by: THORACIC SURGERY (CARDIOTHORACIC VASCULAR SURGERY)

## 2020-03-11 PROCEDURE — 021209W BYPASS CORONARY ARTERY, THREE ARTERIES FROM AORTA WITH AUTOLOGOUS VENOUS TISSUE, OPEN APPROACH: ICD-10-PCS | Performed by: THORACIC SURGERY (CARDIOTHORACIC VASCULAR SURGERY)

## 2020-03-11 PROCEDURE — 3E080GC INTRODUCTION OF OTHER THERAPEUTIC SUBSTANCE INTO HEART, OPEN APPROACH: ICD-10-PCS | Performed by: THORACIC SURGERY (CARDIOTHORACIC VASCULAR SURGERY)

## 2020-03-11 PROCEDURE — C9290 INJ, BUPIVACAINE LIPOSOME: HCPCS | Performed by: THORACIC SURGERY (CARDIOTHORACIC VASCULAR SURGERY)

## 2020-03-11 PROCEDURE — 93010 ELECTROCARDIOGRAM REPORT: CPT | Performed by: INTERNAL MEDICINE

## 2020-03-11 PROCEDURE — 25010000003 INSULIN REGULAR HUMAN PER 5 UNITS: Performed by: THORACIC SURGERY (CARDIOTHORACIC VASCULAR SURGERY)

## 2020-03-11 PROCEDURE — 25010000002 ALBUMIN HUMAN 5% PER 50 ML: Performed by: THORACIC SURGERY (CARDIOTHORACIC VASCULAR SURGERY)

## 2020-03-11 PROCEDURE — 85610 PROTHROMBIN TIME: CPT | Performed by: THORACIC SURGERY (CARDIOTHORACIC VASCULAR SURGERY)

## 2020-03-11 PROCEDURE — 33519 CABG ARTERY-VEIN THREE: CPT | Performed by: THORACIC SURGERY (CARDIOTHORACIC VASCULAR SURGERY)

## 2020-03-11 PROCEDURE — 82962 GLUCOSE BLOOD TEST: CPT

## 2020-03-11 PROCEDURE — 25010000002 HEPARIN (PORCINE) PER 1000 UNITS

## 2020-03-11 PROCEDURE — 25010000002 ALBUMIN HUMAN 25% PER 50 ML

## 2020-03-11 PROCEDURE — 02100Z9 BYPASS CORONARY ARTERY, ONE ARTERY FROM LEFT INTERNAL MAMMARY, OPEN APPROACH: ICD-10-PCS | Performed by: THORACIC SURGERY (CARDIOTHORACIC VASCULAR SURGERY)

## 2020-03-11 PROCEDURE — 93005 ELECTROCARDIOGRAM TRACING: CPT | Performed by: THORACIC SURGERY (CARDIOTHORACIC VASCULAR SURGERY)

## 2020-03-11 PROCEDURE — 85730 THROMBOPLASTIN TIME PARTIAL: CPT | Performed by: THORACIC SURGERY (CARDIOTHORACIC VASCULAR SURGERY)

## 2020-03-11 PROCEDURE — 93318 ECHO TRANSESOPHAGEAL INTRAOP: CPT | Performed by: ANESTHESIOLOGY

## 2020-03-11 PROCEDURE — 33519 CABG ARTERY-VEIN THREE: CPT | Performed by: PHYSICIAN ASSISTANT

## 2020-03-11 PROCEDURE — 25010000003 PROTAMINE SULFATE PER 10 MG: Performed by: THORACIC SURGERY (CARDIOTHORACIC VASCULAR SURGERY)

## 2020-03-11 PROCEDURE — P9041 ALBUMIN (HUMAN),5%, 50ML: HCPCS | Performed by: THORACIC SURGERY (CARDIOTHORACIC VASCULAR SURGERY)

## 2020-03-11 PROCEDURE — 25010000002 HEPARIN (PORCINE) PER 1000 UNITS: Performed by: THORACIC SURGERY (CARDIOTHORACIC VASCULAR SURGERY)

## 2020-03-11 PROCEDURE — 25010000002 VANCOMYCIN 1 G RECONSTITUTED SOLUTION

## 2020-03-11 PROCEDURE — 06BQ4ZZ EXCISION OF LEFT SAPHENOUS VEIN, PERCUTANEOUS ENDOSCOPIC APPROACH: ICD-10-PCS | Performed by: THORACIC SURGERY (CARDIOTHORACIC VASCULAR SURGERY)

## 2020-03-11 PROCEDURE — 25010000003 BUPIVACAINE LIPOSOME 1.3 % SUSPENSION: Performed by: THORACIC SURGERY (CARDIOTHORACIC VASCULAR SURGERY)

## 2020-03-11 PROCEDURE — 33534 CABG ARTERIAL TWO: CPT | Performed by: PHYSICIAN ASSISTANT

## 2020-03-11 PROCEDURE — 25010000002 PROPOFOL 10 MG/ML EMULSION: Performed by: ANESTHESIOLOGY

## 2020-03-11 PROCEDURE — 25010000003 CEFAZOLIN IN DEXTROSE 2-4 GM/100ML-% SOLUTION: Performed by: THORACIC SURGERY (CARDIOTHORACIC VASCULAR SURGERY)

## 2020-03-11 PROCEDURE — 82947 ASSAY GLUCOSE BLOOD QUANT: CPT

## 2020-03-11 PROCEDURE — 25010000002 PHENYLEPHRINE PER 1 ML: Performed by: ANESTHESIOLOGY

## 2020-03-11 PROCEDURE — 25010000002 ONDANSETRON PER 1 MG: Performed by: ANESTHESIOLOGY

## 2020-03-11 PROCEDURE — 02100Z8 BYPASS CORONARY ARTERY, ONE ARTERY FROM RIGHT INTERNAL MAMMARY, OPEN APPROACH: ICD-10-PCS | Performed by: THORACIC SURGERY (CARDIOTHORACIC VASCULAR SURGERY)

## 2020-03-11 PROCEDURE — C1751 CATH, INF, PER/CENT/MIDLINE: HCPCS | Performed by: ANESTHESIOLOGY

## 2020-03-11 PROCEDURE — 33534 CABG ARTERIAL TWO: CPT | Performed by: THORACIC SURGERY (CARDIOTHORACIC VASCULAR SURGERY)

## 2020-03-11 PROCEDURE — 25010000002 HEPARIN (PORCINE) PER 1000 UNITS: Performed by: ANESTHESIOLOGY

## 2020-03-11 PROCEDURE — P9047 ALBUMIN (HUMAN), 25%, 50ML: HCPCS

## 2020-03-11 PROCEDURE — 85025 COMPLETE CBC W/AUTO DIFF WBC: CPT | Performed by: THORACIC SURGERY (CARDIOTHORACIC VASCULAR SURGERY)

## 2020-03-11 PROCEDURE — 33508 ENDOSCOPIC VEIN HARVEST: CPT | Performed by: PHYSICIAN ASSISTANT

## 2020-03-11 PROCEDURE — 82803 BLOOD GASES ANY COMBINATION: CPT

## 2020-03-11 PROCEDURE — 83735 ASSAY OF MAGNESIUM: CPT | Performed by: THORACIC SURGERY (CARDIOTHORACIC VASCULAR SURGERY)

## 2020-03-11 PROCEDURE — 86901 BLOOD TYPING SEROLOGIC RH(D): CPT

## 2020-03-11 PROCEDURE — 85018 HEMOGLOBIN: CPT

## 2020-03-11 DEVICE — SS SUTURE, 4 PER SLEEVE
Type: IMPLANTABLE DEVICE | Site: STERNUM | Status: FUNCTIONAL
Brand: MYO/WIRE II

## 2020-03-11 DEVICE — SS SUTURE, 3 PER SLEEVE
Type: IMPLANTABLE DEVICE | Site: STERNUM | Status: FUNCTIONAL
Brand: MYO/WIRE II

## 2020-03-11 RX ORDER — POLYETHYLENE GLYCOL 3350 17 G/17G
17 POWDER, FOR SOLUTION ORAL DAILY PRN
Status: DISCONTINUED | OUTPATIENT
Start: 2020-03-11 | End: 2020-03-15 | Stop reason: HOSPADM

## 2020-03-11 RX ORDER — SODIUM CHLORIDE, SODIUM LACTATE, POTASSIUM CHLORIDE, CALCIUM CHLORIDE 600; 310; 30; 20 MG/100ML; MG/100ML; MG/100ML; MG/100ML
9 INJECTION, SOLUTION INTRAVENOUS CONTINUOUS
Status: DISCONTINUED | OUTPATIENT
Start: 2020-03-11 | End: 2020-03-11

## 2020-03-11 RX ORDER — PROTAMINE SULFATE 10 MG/ML
INJECTION, SOLUTION INTRAVENOUS AS NEEDED
Status: DISCONTINUED | OUTPATIENT
Start: 2020-03-11 | End: 2020-03-11 | Stop reason: SURG

## 2020-03-11 RX ORDER — NITROGLYCERIN 20 MG/100ML
INJECTION INTRAVENOUS CONTINUOUS PRN
Status: DISCONTINUED | OUTPATIENT
Start: 2020-03-11 | End: 2020-03-11 | Stop reason: SURG

## 2020-03-11 RX ORDER — POTASSIUM CHLORIDE 7.45 MG/ML
10 INJECTION INTRAVENOUS
Status: DISCONTINUED | OUTPATIENT
Start: 2020-03-11 | End: 2020-03-15 | Stop reason: HOSPADM

## 2020-03-11 RX ORDER — BISACODYL 10 MG
10 SUPPOSITORY, RECTAL RECTAL DAILY PRN
Status: DISCONTINUED | OUTPATIENT
Start: 2020-03-12 | End: 2020-03-15 | Stop reason: HOSPADM

## 2020-03-11 RX ORDER — ACETAMINOPHEN 160 MG/5ML
650 SOLUTION ORAL EVERY 4 HOURS
Status: DISCONTINUED | OUTPATIENT
Start: 2020-03-11 | End: 2020-03-12

## 2020-03-11 RX ORDER — LIDOCAINE HYDROCHLORIDE 40 MG/ML
SOLUTION TOPICAL AS NEEDED
Status: DISCONTINUED | OUTPATIENT
Start: 2020-03-11 | End: 2020-03-11 | Stop reason: SURG

## 2020-03-11 RX ORDER — LIDOCAINE HYDROCHLORIDE 10 MG/ML
0.5 INJECTION, SOLUTION EPIDURAL; INFILTRATION; INTRACAUDAL; PERINEURAL ONCE AS NEEDED
Status: DISCONTINUED | OUTPATIENT
Start: 2020-03-11 | End: 2020-03-11 | Stop reason: HOSPADM

## 2020-03-11 RX ORDER — SODIUM CHLORIDE 9 MG/ML
30 INJECTION, SOLUTION INTRAVENOUS CONTINUOUS PRN
Status: DISCONTINUED | OUTPATIENT
Start: 2020-03-11 | End: 2020-03-12

## 2020-03-11 RX ORDER — FENTANYL CITRATE 50 UG/ML
50 INJECTION, SOLUTION INTRAMUSCULAR; INTRAVENOUS
Status: CANCELLED | OUTPATIENT
Start: 2020-03-11

## 2020-03-11 RX ORDER — ALPRAZOLAM 0.25 MG/1
0.25 TABLET ORAL EVERY 8 HOURS PRN
Status: DISCONTINUED | OUTPATIENT
Start: 2020-03-11 | End: 2020-03-15 | Stop reason: HOSPADM

## 2020-03-11 RX ORDER — SODIUM CHLORIDE 0.9 % (FLUSH) 0.9 %
3 SYRINGE (ML) INJECTION EVERY 12 HOURS SCHEDULED
Status: CANCELLED | OUTPATIENT
Start: 2020-03-11

## 2020-03-11 RX ORDER — CEFAZOLIN SODIUM 2 G/100ML
2 INJECTION, SOLUTION INTRAVENOUS EVERY 8 HOURS
Status: COMPLETED | OUTPATIENT
Start: 2020-03-11 | End: 2020-03-13

## 2020-03-11 RX ORDER — CYCLOBENZAPRINE HCL 10 MG
10 TABLET ORAL EVERY 8 HOURS PRN
Status: DISCONTINUED | OUTPATIENT
Start: 2020-03-12 | End: 2020-03-15 | Stop reason: HOSPADM

## 2020-03-11 RX ORDER — NITROGLYCERIN 5 MG/ML
INJECTION, SOLUTION INTRAVENOUS AS NEEDED
Status: DISCONTINUED | OUTPATIENT
Start: 2020-03-11 | End: 2020-03-11 | Stop reason: SURG

## 2020-03-11 RX ORDER — ACETAMINOPHEN 10 MG/ML
1000 INJECTION, SOLUTION INTRAVENOUS ONCE
Status: COMPLETED | OUTPATIENT
Start: 2020-03-11 | End: 2020-03-11

## 2020-03-11 RX ORDER — HEPARIN SODIUM 1000 [USP'U]/ML
INJECTION, SOLUTION INTRAVENOUS; SUBCUTANEOUS AS NEEDED
Status: DISCONTINUED | OUTPATIENT
Start: 2020-03-11 | End: 2020-03-11 | Stop reason: SURG

## 2020-03-11 RX ORDER — GLYCOPYRROLATE 0.2 MG/ML
INJECTION INTRAMUSCULAR; INTRAVENOUS AS NEEDED
Status: DISCONTINUED | OUTPATIENT
Start: 2020-03-11 | End: 2020-03-11 | Stop reason: SURG

## 2020-03-11 RX ORDER — MAGNESIUM SULFATE HEPTAHYDRATE 500 MG/ML
INJECTION, SOLUTION INTRAMUSCULAR; INTRAVENOUS AS NEEDED
Status: DISCONTINUED | OUTPATIENT
Start: 2020-03-11 | End: 2020-03-11 | Stop reason: SURG

## 2020-03-11 RX ORDER — SODIUM CHLORIDE 0.9 % (FLUSH) 0.9 %
3-10 SYRINGE (ML) INJECTION AS NEEDED
Status: DISCONTINUED | OUTPATIENT
Start: 2020-03-11 | End: 2020-03-11 | Stop reason: HOSPADM

## 2020-03-11 RX ORDER — ACETAMINOPHEN 325 MG/1
650 TABLET ORAL EVERY 4 HOURS PRN
Status: DISCONTINUED | OUTPATIENT
Start: 2020-03-12 | End: 2020-03-15 | Stop reason: HOSPADM

## 2020-03-11 RX ORDER — SODIUM CHLORIDE 9 MG/ML
30 INJECTION, SOLUTION INTRAVENOUS CONTINUOUS
Status: DISCONTINUED | OUTPATIENT
Start: 2020-03-11 | End: 2020-03-12

## 2020-03-11 RX ORDER — MIDAZOLAM HYDROCHLORIDE 1 MG/ML
1 INJECTION INTRAMUSCULAR; INTRAVENOUS
Status: DISCONTINUED | OUTPATIENT
Start: 2020-03-11 | End: 2020-03-11 | Stop reason: HOSPADM

## 2020-03-11 RX ORDER — MORPHINE SULFATE 2 MG/ML
4 INJECTION, SOLUTION INTRAMUSCULAR; INTRAVENOUS
Status: DISCONTINUED | OUTPATIENT
Start: 2020-03-11 | End: 2020-03-12

## 2020-03-11 RX ORDER — ACETAMINOPHEN 650 MG/1
650 SUPPOSITORY RECTAL EVERY 4 HOURS
Status: DISCONTINUED | OUTPATIENT
Start: 2020-03-11 | End: 2020-03-12

## 2020-03-11 RX ORDER — PROPOFOL 10 MG/ML
VIAL (ML) INTRAVENOUS CONTINUOUS PRN
Status: DISCONTINUED | OUTPATIENT
Start: 2020-03-11 | End: 2020-03-11 | Stop reason: SURG

## 2020-03-11 RX ORDER — POTASSIUM CHLORIDE 29.8 MG/ML
20 INJECTION INTRAVENOUS
Status: DISCONTINUED | OUTPATIENT
Start: 2020-03-11 | End: 2020-03-15 | Stop reason: HOSPADM

## 2020-03-11 RX ORDER — CHLORHEXIDINE GLUCONATE 0.12 MG/ML
15 RINSE ORAL EVERY 12 HOURS
Status: DISCONTINUED | OUTPATIENT
Start: 2020-03-11 | End: 2020-03-15 | Stop reason: HOSPADM

## 2020-03-11 RX ORDER — SODIUM CHLORIDE 0.9 % (FLUSH) 0.9 %
30 SYRINGE (ML) INJECTION ONCE AS NEEDED
Status: DISCONTINUED | OUTPATIENT
Start: 2020-03-11 | End: 2020-03-12

## 2020-03-11 RX ORDER — AMINOCAPROIC ACID 250 MG/ML
INJECTION, SOLUTION INTRAVENOUS AS NEEDED
Status: DISCONTINUED | OUTPATIENT
Start: 2020-03-11 | End: 2020-03-11 | Stop reason: SURG

## 2020-03-11 RX ORDER — MIDAZOLAM HYDROCHLORIDE 1 MG/ML
2 INJECTION INTRAMUSCULAR; INTRAVENOUS
Status: DISCONTINUED | OUTPATIENT
Start: 2020-03-11 | End: 2020-03-12

## 2020-03-11 RX ORDER — PANTOPRAZOLE SODIUM 40 MG/1
40 TABLET, DELAYED RELEASE ORAL
Status: DISCONTINUED | OUTPATIENT
Start: 2020-03-12 | End: 2020-03-15 | Stop reason: HOSPADM

## 2020-03-11 RX ORDER — PROMETHAZINE HYDROCHLORIDE 25 MG/1
12.5 TABLET ORAL EVERY 6 HOURS PRN
Status: DISCONTINUED | OUTPATIENT
Start: 2020-03-11 | End: 2020-03-15 | Stop reason: HOSPADM

## 2020-03-11 RX ORDER — PROPOFOL 10 MG/ML
VIAL (ML) INTRAVENOUS AS NEEDED
Status: DISCONTINUED | OUTPATIENT
Start: 2020-03-11 | End: 2020-03-11 | Stop reason: SURG

## 2020-03-11 RX ORDER — HEPARIN SODIUM 5000 [USP'U]/ML
INJECTION, SOLUTION INTRAVENOUS; SUBCUTANEOUS AS NEEDED
Status: DISCONTINUED | OUTPATIENT
Start: 2020-03-11 | End: 2020-03-11 | Stop reason: HOSPADM

## 2020-03-11 RX ORDER — CEFAZOLIN SODIUM 2 G/100ML
2 INJECTION, SOLUTION INTRAVENOUS
Status: COMPLETED | OUTPATIENT
Start: 2020-03-11 | End: 2020-03-11

## 2020-03-11 RX ORDER — ACETAMINOPHEN 325 MG/1
650 TABLET ORAL EVERY 4 HOURS
Status: DISCONTINUED | OUTPATIENT
Start: 2020-03-11 | End: 2020-03-12

## 2020-03-11 RX ORDER — METOCLOPRAMIDE HYDROCHLORIDE 5 MG/ML
10 INJECTION INTRAMUSCULAR; INTRAVENOUS EVERY 6 HOURS
Status: COMPLETED | OUTPATIENT
Start: 2020-03-11 | End: 2020-03-12

## 2020-03-11 RX ORDER — SODIUM CHLORIDE, SODIUM LACTATE, POTASSIUM CHLORIDE, CALCIUM CHLORIDE 600; 310; 30; 20 MG/100ML; MG/100ML; MG/100ML; MG/100ML
9 INJECTION, SOLUTION INTRAVENOUS CONTINUOUS
Status: CANCELLED | OUTPATIENT
Start: 2020-03-11

## 2020-03-11 RX ORDER — MIDAZOLAM HYDROCHLORIDE 1 MG/ML
2 INJECTION INTRAMUSCULAR; INTRAVENOUS
Status: DISCONTINUED | OUTPATIENT
Start: 2020-03-11 | End: 2020-03-11 | Stop reason: HOSPADM

## 2020-03-11 RX ORDER — MAGNESIUM SULFATE 1 G/100ML
1 INJECTION INTRAVENOUS EVERY 8 HOURS
Status: DISCONTINUED | OUTPATIENT
Start: 2020-03-11 | End: 2020-03-12

## 2020-03-11 RX ORDER — ASPIRIN 81 MG/1
81 TABLET ORAL DAILY
Status: DISCONTINUED | OUTPATIENT
Start: 2020-03-12 | End: 2020-03-15 | Stop reason: HOSPADM

## 2020-03-11 RX ORDER — POTASSIUM CHLORIDE 750 MG/1
40 CAPSULE, EXTENDED RELEASE ORAL AS NEEDED
Status: DISCONTINUED | OUTPATIENT
Start: 2020-03-11 | End: 2020-03-15 | Stop reason: HOSPADM

## 2020-03-11 RX ORDER — SUFENTANIL CITRATE 50 UG/ML
INJECTION EPIDURAL; INTRAVENOUS AS NEEDED
Status: DISCONTINUED | OUTPATIENT
Start: 2020-03-11 | End: 2020-03-11 | Stop reason: SURG

## 2020-03-11 RX ORDER — SODIUM CHLORIDE 0.9 % (FLUSH) 0.9 %
10 SYRINGE (ML) INJECTION AS NEEDED
Status: CANCELLED | OUTPATIENT
Start: 2020-03-11

## 2020-03-11 RX ORDER — ONDANSETRON 2 MG/ML
4 INJECTION INTRAMUSCULAR; INTRAVENOUS EVERY 6 HOURS PRN
Status: DISCONTINUED | OUTPATIENT
Start: 2020-03-11 | End: 2020-03-15 | Stop reason: HOSPADM

## 2020-03-11 RX ORDER — FAMOTIDINE 10 MG/ML
20 INJECTION, SOLUTION INTRAVENOUS ONCE
Status: COMPLETED | OUTPATIENT
Start: 2020-03-11 | End: 2020-03-11

## 2020-03-11 RX ORDER — POTASSIUM CHLORIDE 1.5 G/1.77G
40 POWDER, FOR SOLUTION ORAL AS NEEDED
Status: DISCONTINUED | OUTPATIENT
Start: 2020-03-11 | End: 2020-03-15 | Stop reason: HOSPADM

## 2020-03-11 RX ORDER — FUROSEMIDE 10 MG/ML
40 INJECTION INTRAMUSCULAR; INTRAVENOUS EVERY 6 HOURS PRN
Status: DISCONTINUED | OUTPATIENT
Start: 2020-03-11 | End: 2020-03-12

## 2020-03-11 RX ORDER — NICARDIPINE HYDROCHLORIDE 2.5 MG/ML
INJECTION INTRAVENOUS AS NEEDED
Status: DISCONTINUED | OUTPATIENT
Start: 2020-03-11 | End: 2020-03-11 | Stop reason: SURG

## 2020-03-11 RX ORDER — MEPERIDINE HYDROCHLORIDE 25 MG/ML
25 INJECTION INTRAMUSCULAR; INTRAVENOUS; SUBCUTANEOUS EVERY 4 HOURS PRN
Status: ACTIVE | OUTPATIENT
Start: 2020-03-11 | End: 2020-03-11

## 2020-03-11 RX ORDER — NALOXONE HCL 0.4 MG/ML
0.4 VIAL (ML) INJECTION
Status: DISCONTINUED | OUTPATIENT
Start: 2020-03-11 | End: 2020-03-15 | Stop reason: HOSPADM

## 2020-03-11 RX ORDER — BISACODYL 5 MG/1
10 TABLET, DELAYED RELEASE ORAL DAILY PRN
Status: DISCONTINUED | OUTPATIENT
Start: 2020-03-11 | End: 2020-03-15 | Stop reason: HOSPADM

## 2020-03-11 RX ORDER — KETAMINE HYDROCHLORIDE 10 MG/ML
INJECTION INTRAMUSCULAR; INTRAVENOUS AS NEEDED
Status: DISCONTINUED | OUTPATIENT
Start: 2020-03-11 | End: 2020-03-11 | Stop reason: SURG

## 2020-03-11 RX ORDER — LIDOCAINE HYDROCHLORIDE 20 MG/ML
INJECTION, SOLUTION INFILTRATION; PERINEURAL AS NEEDED
Status: DISCONTINUED | OUTPATIENT
Start: 2020-03-11 | End: 2020-03-11 | Stop reason: SURG

## 2020-03-11 RX ORDER — PAPAVERINE HYDROCHLORIDE 30 MG/ML
INJECTION INTRAMUSCULAR; INTRAVENOUS AS NEEDED
Status: DISCONTINUED | OUTPATIENT
Start: 2020-03-11 | End: 2020-03-11 | Stop reason: HOSPADM

## 2020-03-11 RX ORDER — SODIUM CHLORIDE 9 MG/ML
INJECTION, SOLUTION INTRAVENOUS CONTINUOUS PRN
Status: DISCONTINUED | OUTPATIENT
Start: 2020-03-11 | End: 2020-03-11 | Stop reason: SURG

## 2020-03-11 RX ORDER — MORPHINE SULFATE 2 MG/ML
1 INJECTION, SOLUTION INTRAMUSCULAR; INTRAVENOUS EVERY 4 HOURS PRN
Status: DISCONTINUED | OUTPATIENT
Start: 2020-03-11 | End: 2020-03-15 | Stop reason: HOSPADM

## 2020-03-11 RX ORDER — CHLORHEXIDINE GLUCONATE 500 MG/1
1 CLOTH TOPICAL EVERY 12 HOURS PRN
Status: DISCONTINUED | OUTPATIENT
Start: 2020-03-11 | End: 2020-03-11 | Stop reason: HOSPADM

## 2020-03-11 RX ORDER — DOPAMINE HYDROCHLORIDE 160 MG/100ML
2-20 INJECTION, SOLUTION INTRAVENOUS CONTINUOUS PRN
Status: DISCONTINUED | OUTPATIENT
Start: 2020-03-11 | End: 2020-03-12

## 2020-03-11 RX ORDER — MILRINONE LACTATE 0.2 MG/ML
.25-.75 INJECTION, SOLUTION INTRAVENOUS CONTINUOUS PRN
Status: DISCONTINUED | OUTPATIENT
Start: 2020-03-11 | End: 2020-03-12

## 2020-03-11 RX ORDER — EPHEDRINE SULFATE 50 MG/ML
INJECTION, SOLUTION INTRAVENOUS AS NEEDED
Status: DISCONTINUED | OUTPATIENT
Start: 2020-03-11 | End: 2020-03-11 | Stop reason: SURG

## 2020-03-11 RX ORDER — ACETAMINOPHEN 160 MG/5ML
650 SOLUTION ORAL EVERY 4 HOURS PRN
Status: DISCONTINUED | OUTPATIENT
Start: 2020-03-12 | End: 2020-03-15 | Stop reason: HOSPADM

## 2020-03-11 RX ORDER — CHLORHEXIDINE GLUCONATE 0.12 MG/ML
15 RINSE ORAL ONCE
Status: DISCONTINUED | OUTPATIENT
Start: 2020-03-11 | End: 2020-03-11 | Stop reason: HOSPADM

## 2020-03-11 RX ORDER — DOCUSATE SODIUM 100 MG/1
100 CAPSULE, LIQUID FILLED ORAL 2 TIMES DAILY PRN
Status: DISCONTINUED | OUTPATIENT
Start: 2020-03-11 | End: 2020-03-15 | Stop reason: HOSPADM

## 2020-03-11 RX ORDER — OXYCODONE HYDROCHLORIDE 5 MG/1
10 TABLET ORAL EVERY 4 HOURS PRN
Status: DISCONTINUED | OUTPATIENT
Start: 2020-03-11 | End: 2020-03-15 | Stop reason: HOSPADM

## 2020-03-11 RX ORDER — HYDROCODONE BITARTRATE AND ACETAMINOPHEN 5; 325 MG/1; MG/1
2 TABLET ORAL EVERY 4 HOURS PRN
Status: DISCONTINUED | OUTPATIENT
Start: 2020-03-11 | End: 2020-03-15 | Stop reason: HOSPADM

## 2020-03-11 RX ORDER — NOREPINEPHRINE BIT/0.9 % NACL 8 MG/250ML
.02-.3 INFUSION BOTTLE (ML) INTRAVENOUS CONTINUOUS PRN
Status: DISCONTINUED | OUTPATIENT
Start: 2020-03-11 | End: 2020-03-12

## 2020-03-11 RX ORDER — PROTAMINE SULFATE 10 MG/ML
INJECTION, SOLUTION INTRAVENOUS AS NEEDED
Status: DISCONTINUED | OUTPATIENT
Start: 2020-03-11 | End: 2020-03-11 | Stop reason: HOSPADM

## 2020-03-11 RX ORDER — ALBUMIN, HUMAN INJ 5% 5 %
1500 SOLUTION INTRAVENOUS AS NEEDED
Status: DISPENSED | OUTPATIENT
Start: 2020-03-11 | End: 2020-03-12

## 2020-03-11 RX ORDER — ONDANSETRON 2 MG/ML
INJECTION INTRAMUSCULAR; INTRAVENOUS AS NEEDED
Status: DISCONTINUED | OUTPATIENT
Start: 2020-03-11 | End: 2020-03-11 | Stop reason: SURG

## 2020-03-11 RX ORDER — SENNA AND DOCUSATE SODIUM 50; 8.6 MG/1; MG/1
2 TABLET, FILM COATED ORAL NIGHTLY
Status: DISCONTINUED | OUTPATIENT
Start: 2020-03-12 | End: 2020-03-15 | Stop reason: HOSPADM

## 2020-03-11 RX ORDER — FENTANYL CITRATE 50 UG/ML
50 INJECTION, SOLUTION INTRAMUSCULAR; INTRAVENOUS
Status: DISCONTINUED | OUTPATIENT
Start: 2020-03-11 | End: 2020-03-11 | Stop reason: HOSPADM

## 2020-03-11 RX ORDER — NITROGLYCERIN 20 MG/100ML
5-200 INJECTION INTRAVENOUS
Status: DISCONTINUED | OUTPATIENT
Start: 2020-03-11 | End: 2020-03-12

## 2020-03-11 RX ORDER — ATORVASTATIN CALCIUM 20 MG/1
40 TABLET, FILM COATED ORAL NIGHTLY
Status: DISCONTINUED | OUTPATIENT
Start: 2020-03-11 | End: 2020-03-15 | Stop reason: HOSPADM

## 2020-03-11 RX ORDER — ACETAMINOPHEN 650 MG/1
650 SUPPOSITORY RECTAL EVERY 4 HOURS PRN
Status: DISCONTINUED | OUTPATIENT
Start: 2020-03-12 | End: 2020-03-15 | Stop reason: HOSPADM

## 2020-03-11 RX ORDER — PROMETHAZINE HYDROCHLORIDE 25 MG/ML
12.5 INJECTION, SOLUTION INTRAMUSCULAR; INTRAVENOUS EVERY 6 HOURS PRN
Status: DISCONTINUED | OUTPATIENT
Start: 2020-03-11 | End: 2020-03-15 | Stop reason: HOSPADM

## 2020-03-11 RX ORDER — VECURONIUM BROMIDE 20 MG/20ML
INJECTION, POWDER, LYOPHILIZED, FOR SOLUTION INTRAVENOUS AS NEEDED
Status: DISCONTINUED | OUTPATIENT
Start: 2020-03-11 | End: 2020-03-11 | Stop reason: SURG

## 2020-03-11 RX ORDER — SODIUM CHLORIDE 0.9 % (FLUSH) 0.9 %
3 SYRINGE (ML) INJECTION EVERY 12 HOURS SCHEDULED
Status: DISCONTINUED | OUTPATIENT
Start: 2020-03-11 | End: 2020-03-11 | Stop reason: HOSPADM

## 2020-03-11 RX ADMIN — MAGNESIUM SULFATE 1 G: 1 INJECTION INTRAVENOUS at 20:47

## 2020-03-11 RX ADMIN — SUFENTANIL CITRATE 50 MCG: 50 INJECTION, SOLUTION EPIDURAL; INTRAVENOUS at 10:25

## 2020-03-11 RX ADMIN — NICARDIPINE HYDROCHLORIDE 0.2 MG: 25 INJECTION INTRAVENOUS at 08:21

## 2020-03-11 RX ADMIN — MAGNESIUM SULFATE HEPTAHYDRATE 2 G: 500 INJECTION, SOLUTION INTRAMUSCULAR; INTRAVENOUS at 10:14

## 2020-03-11 RX ADMIN — PHENYLEPHRINE HYDROCHLORIDE 100 MCG: 10 INJECTION INTRAVENOUS at 07:25

## 2020-03-11 RX ADMIN — ACETAMINOPHEN ORAL SOLUTION 650 MG: 325 SOLUTION ORAL at 20:47

## 2020-03-11 RX ADMIN — NITROGLYCERIN 100 MCG: 5 INJECTION, SOLUTION INTRAVENOUS at 08:54

## 2020-03-11 RX ADMIN — NICARDIPINE HYDROCHLORIDE 0.2 MG: 25 INJECTION INTRAVENOUS at 10:36

## 2020-03-11 RX ADMIN — KETAMINE HYDROCHLORIDE 10 MG: 10 INJECTION INTRAMUSCULAR; INTRAVENOUS at 09:11

## 2020-03-11 RX ADMIN — NITROGLYCERIN 100 MCG: 5 INJECTION, SOLUTION INTRAVENOUS at 08:53

## 2020-03-11 RX ADMIN — FAMOTIDINE 20 MG: 10 INJECTION INTRAVENOUS at 06:00

## 2020-03-11 RX ADMIN — NITROGLYCERIN 100 MCG: 5 INJECTION, SOLUTION INTRAVENOUS at 09:00

## 2020-03-11 RX ADMIN — MIDAZOLAM 1 MG: 1 INJECTION INTRAMUSCULAR; INTRAVENOUS at 07:02

## 2020-03-11 RX ADMIN — NICARDIPINE HYDROCHLORIDE 0.5 MG: 25 INJECTION INTRAVENOUS at 11:00

## 2020-03-11 RX ADMIN — ATORVASTATIN CALCIUM 40 MG: 20 TABLET, FILM COATED ORAL at 20:47

## 2020-03-11 RX ADMIN — SUFENTANIL CITRATE 50 MCG: 50 INJECTION, SOLUTION EPIDURAL; INTRAVENOUS at 07:58

## 2020-03-11 RX ADMIN — MIDAZOLAM 2 MG: 1 INJECTION INTRAMUSCULAR; INTRAVENOUS at 06:51

## 2020-03-11 RX ADMIN — NICARDIPINE HYDROCHLORIDE 0.4 MG: 25 INJECTION INTRAVENOUS at 10:41

## 2020-03-11 RX ADMIN — METOPROLOL TARTRATE 1 MG: 5 INJECTION, SOLUTION INTRAVENOUS at 10:44

## 2020-03-11 RX ADMIN — NICARDIPINE HYDROCHLORIDE 0.2 MG: 25 INJECTION INTRAVENOUS at 10:54

## 2020-03-11 RX ADMIN — SUFENTANIL CITRATE 50 MCG: 50 INJECTION, SOLUTION EPIDURAL; INTRAVENOUS at 08:21

## 2020-03-11 RX ADMIN — PROTAMINE SULFATE 300 MG: 10 INJECTION, SOLUTION INTRAVENOUS at 10:29

## 2020-03-11 RX ADMIN — SODIUM CHLORIDE 30 ML/HR: 9 INJECTION, SOLUTION INTRAVENOUS at 22:00

## 2020-03-11 RX ADMIN — AMINOCAPROIC ACID 10 G: 250 INJECTION, SOLUTION INTRAVENOUS at 10:38

## 2020-03-11 RX ADMIN — NICARDIPINE HYDROCHLORIDE 0.2 MG: 25 INJECTION INTRAVENOUS at 08:53

## 2020-03-11 RX ADMIN — CEFAZOLIN SODIUM 2 G: 2 INJECTION, SOLUTION INTRAVENOUS at 07:25

## 2020-03-11 RX ADMIN — METOCLOPRAMIDE 10 MG: 5 INJECTION, SOLUTION INTRAMUSCULAR; INTRAVENOUS at 12:47

## 2020-03-11 RX ADMIN — METOPROLOL TARTRATE 1 MG: 5 INJECTION, SOLUTION INTRAVENOUS at 10:54

## 2020-03-11 RX ADMIN — MIDAZOLAM 1 MG: 1 INJECTION INTRAMUSCULAR; INTRAVENOUS at 06:03

## 2020-03-11 RX ADMIN — FENTANYL CITRATE 50 MCG: 50 INJECTION INTRAMUSCULAR; INTRAVENOUS at 06:51

## 2020-03-11 RX ADMIN — NICARDIPINE HYDROCHLORIDE 0.2 MG: 25 INJECTION INTRAVENOUS at 10:38

## 2020-03-11 RX ADMIN — KETAMINE HYDROCHLORIDE 10 MG: 10 INJECTION INTRAMUSCULAR; INTRAVENOUS at 07:10

## 2020-03-11 RX ADMIN — LIDOCAINE HYDROCHLORIDE 100 MG: 20 INJECTION, SOLUTION INFILTRATION; PERINEURAL at 07:02

## 2020-03-11 RX ADMIN — GLYCOPYRROLATE 0.6 MG: 0.2 INJECTION INTRAMUSCULAR; INTRAVENOUS at 11:48

## 2020-03-11 RX ADMIN — NICARDIPINE HYDROCHLORIDE 0.4 MG: 25 INJECTION INTRAVENOUS at 10:34

## 2020-03-11 RX ADMIN — VECURONIUM BROMIDE 4 MG: 1 INJECTION, POWDER, LYOPHILIZED, FOR SOLUTION INTRAVENOUS at 10:14

## 2020-03-11 RX ADMIN — NICARDIPINE HYDROCHLORIDE 0.2 MG: 25 INJECTION INTRAVENOUS at 10:52

## 2020-03-11 RX ADMIN — ONDANSETRON HYDROCHLORIDE 4 MG: 2 SOLUTION INTRAMUSCULAR; INTRAVENOUS at 10:27

## 2020-03-11 RX ADMIN — NEOSTIGMINE METHYLSULFATE 5 MG: 5 INJECTION, SOLUTION INTRAMUSCULAR; INTRAVENOUS; SUBCUTANEOUS at 11:48

## 2020-03-11 RX ADMIN — METOPROLOL TARTRATE 1 MG: 5 INJECTION, SOLUTION INTRAVENOUS at 10:52

## 2020-03-11 RX ADMIN — EPHEDRINE SULFATE 10 MG: 50 INJECTION INTRAVENOUS at 08:51

## 2020-03-11 RX ADMIN — HEPARIN SODIUM 26000 UNITS: 1000 INJECTION, SOLUTION INTRAVENOUS; SUBCUTANEOUS at 08:48

## 2020-03-11 RX ADMIN — CEFAZOLIN SODIUM 2 G: 2 INJECTION, SOLUTION INTRAVENOUS at 10:27

## 2020-03-11 RX ADMIN — DEXMEDETOMIDINE HYDROCHLORIDE 0.2 MCG/KG/HR: 100 INJECTION, SOLUTION, CONCENTRATE INTRAVENOUS at 16:13

## 2020-03-11 RX ADMIN — NICARDIPINE HYDROCHLORIDE 0.2 MG: 25 INJECTION INTRAVENOUS at 10:46

## 2020-03-11 RX ADMIN — LIDOCAINE HYDROCHLORIDE 1 EACH: 40 SOLUTION TOPICAL at 07:07

## 2020-03-11 RX ADMIN — PROPOFOL 120 MG: 10 INJECTION, EMULSION INTRAVENOUS at 07:02

## 2020-03-11 RX ADMIN — SUFENTANIL CITRATE 50 MCG: 50 INJECTION, SOLUTION EPIDURAL; INTRAVENOUS at 09:57

## 2020-03-11 RX ADMIN — NICARDIPINE HYDROCHLORIDE 0.2 MG: 25 INJECTION INTRAVENOUS at 08:18

## 2020-03-11 RX ADMIN — NICARDIPINE HYDROCHLORIDE 0.2 MG: 25 INJECTION INTRAVENOUS at 08:57

## 2020-03-11 RX ADMIN — NITROGLYCERIN 100 MCG: 5 INJECTION, SOLUTION INTRAVENOUS at 08:15

## 2020-03-11 RX ADMIN — VECURONIUM BROMIDE 10 MG: 1 INJECTION, POWDER, LYOPHILIZED, FOR SOLUTION INTRAVENOUS at 07:02

## 2020-03-11 RX ADMIN — NITROGLYCERIN 100 MCG: 5 INJECTION, SOLUTION INTRAVENOUS at 08:17

## 2020-03-11 RX ADMIN — NITROGLYCERIN 100 MCG: 5 INJECTION, SOLUTION INTRAVENOUS at 08:57

## 2020-03-11 RX ADMIN — ALBUMIN HUMAN 500 ML: 0.05 INJECTION, SOLUTION INTRAVENOUS at 12:51

## 2020-03-11 RX ADMIN — KETAMINE HYDROCHLORIDE 10 MG: 10 INJECTION INTRAMUSCULAR; INTRAVENOUS at 10:14

## 2020-03-11 RX ADMIN — NICARDIPINE HYDROCHLORIDE 0.2 MG: 25 INJECTION INTRAVENOUS at 10:33

## 2020-03-11 RX ADMIN — NITROGLYCERIN 100 MCG: 5 INJECTION, SOLUTION INTRAVENOUS at 08:07

## 2020-03-11 RX ADMIN — NICARDIPINE HYDROCHLORIDE 0.5 MG: 25 INJECTION INTRAVENOUS at 08:13

## 2020-03-11 RX ADMIN — SODIUM CHLORIDE 0.4 MCG/KG/HR: 900 INJECTION, SOLUTION INTRAVENOUS at 07:35

## 2020-03-11 RX ADMIN — CEFAZOLIN SODIUM 2 G: 2 INJECTION, SOLUTION INTRAVENOUS at 17:57

## 2020-03-11 RX ADMIN — MIDAZOLAM 2 MG: 1 INJECTION INTRAMUSCULAR; INTRAVENOUS at 10:25

## 2020-03-11 RX ADMIN — EPHEDRINE SULFATE 10 MG: 50 INJECTION INTRAVENOUS at 07:25

## 2020-03-11 RX ADMIN — FENTANYL CITRATE 50 MCG: 50 INJECTION INTRAMUSCULAR; INTRAVENOUS at 07:02

## 2020-03-11 RX ADMIN — KETAMINE HYDROCHLORIDE 10 MG: 10 INJECTION INTRAMUSCULAR; INTRAVENOUS at 11:02

## 2020-03-11 RX ADMIN — NICARDIPINE HYDROCHLORIDE 0.2 MG: 25 INJECTION INTRAVENOUS at 10:49

## 2020-03-11 RX ADMIN — CHLORHEXIDINE GLUCONATE 15 ML: 1.2 RINSE ORAL at 12:47

## 2020-03-11 RX ADMIN — SODIUM CHLORIDE 2.4 UNITS/HR: 9 INJECTION, SOLUTION INTRAVENOUS at 22:00

## 2020-03-11 RX ADMIN — SODIUM CHLORIDE, POTASSIUM CHLORIDE, SODIUM LACTATE AND CALCIUM CHLORIDE 9 ML/HR: 600; 310; 30; 20 INJECTION, SOLUTION INTRAVENOUS at 05:59

## 2020-03-11 RX ADMIN — NICARDIPINE HYDROCHLORIDE 0.2 MG: 25 INJECTION INTRAVENOUS at 08:54

## 2020-03-11 RX ADMIN — NICARDIPINE HYDROCHLORIDE 0.2 MG: 25 INJECTION INTRAVENOUS at 09:00

## 2020-03-11 RX ADMIN — NICARDIPINE HYDROCHLORIDE 0.2 MG: 25 INJECTION INTRAVENOUS at 10:44

## 2020-03-11 RX ADMIN — NICARDIPINE HYDROCHLORIDE 0.4 MG: 25 INJECTION INTRAVENOUS at 10:39

## 2020-03-11 RX ADMIN — ALBUMIN HUMAN 250 ML: 0.05 INJECTION, SOLUTION INTRAVENOUS at 13:50

## 2020-03-11 RX ADMIN — METOCLOPRAMIDE 10 MG: 5 INJECTION, SOLUTION INTRAMUSCULAR; INTRAVENOUS at 17:56

## 2020-03-11 RX ADMIN — PROPOFOL 80 MG: 10 INJECTION, EMULSION INTRAVENOUS at 08:55

## 2020-03-11 RX ADMIN — PROPOFOL 50 MCG/KG/MIN: 10 INJECTION, EMULSION INTRAVENOUS at 08:48

## 2020-03-11 RX ADMIN — MUPIROCIN 1 APPLICATION: 20 OINTMENT TOPICAL at 20:47

## 2020-03-11 RX ADMIN — KETAMINE HYDROCHLORIDE 10 MG: 10 INJECTION INTRAMUSCULAR; INTRAVENOUS at 08:14

## 2020-03-11 RX ADMIN — ACETAMINOPHEN 1000 MG: 10 INJECTION, SOLUTION INTRAVENOUS at 12:47

## 2020-03-11 RX ADMIN — SODIUM CHLORIDE: 9 INJECTION, SOLUTION INTRAVENOUS at 06:44

## 2020-03-11 RX ADMIN — SODIUM CHLORIDE 30 ML/HR: 9 INJECTION, SOLUTION INTRAVENOUS at 12:17

## 2020-03-11 RX ADMIN — AMINOCAPROIC ACID 10 G: 250 INJECTION, SOLUTION INTRAVENOUS at 07:46

## 2020-03-11 RX ADMIN — METOPROLOL TARTRATE 1 MG: 5 INJECTION, SOLUTION INTRAVENOUS at 10:49

## 2020-03-11 RX ADMIN — VECURONIUM BROMIDE 2 MG: 1 INJECTION, POWDER, LYOPHILIZED, FOR SOLUTION INTRAVENOUS at 08:21

## 2020-03-11 RX ADMIN — VECURONIUM BROMIDE 4 MG: 1 INJECTION, POWDER, LYOPHILIZED, FOR SOLUTION INTRAVENOUS at 08:53

## 2020-03-11 RX ADMIN — METOPROLOL TARTRATE 1 MG: 5 INJECTION, SOLUTION INTRAVENOUS at 10:46

## 2020-03-11 RX ADMIN — NITROGLYCERIN 0.25 MCG/KG/MIN: 20 INJECTION INTRAVENOUS at 08:00

## 2020-03-11 RX ADMIN — SODIUM CHLORIDE 5 MG/HR: 9 INJECTION, SOLUTION INTRAVENOUS at 08:18

## 2020-03-11 NOTE — ANESTHESIA POSTPROCEDURE EVALUATION
Patient: Prosper Darling    Procedure Summary     Date:  03/11/20 Room / Location:  [facility] OR [address] [place] [address] MAIN OR    Anesthesia Start:  0644 Anesthesia Stop:  1152    Procedure:  SHARRI, MEDIAN STERNOTOMY, CORONARY ARTERY BYPASS GRAFTING X 5   UTILIZING MARIEL INTERNAL MAMMARY ARTERIES AND ENDOSCOPICALLY HARVESTED LEFT SAPHENOUS VEIN, PRP (N/A Chest) Diagnosis:       Coronary artery disease of native heart with stable angina pectoris, unspecified vessel or lesion type (CMS/HCC)      (Coronary artery disease of native heart with stable angina pectoris, unspecified vessel or lesion type (CMS/HCC) [I25.118])    Surgeon:  Jr Manuel Orourke MD Provider:  Diogo Hayes MD    Anesthesia Type:  general ASA Status:  4          Anesthesia Type: general    Vitals  Vitals Value Taken Time   /65 3/11/2020 12:15 PM   Temp     Pulse 80 3/11/2020 12:21 PM   Resp 20 3/11/2020 11:48 AM   SpO2 100 % 3/11/2020 12:21 PM   Vitals shown include unvalidated device data.        Post Anesthesia Care and Evaluation    Patient location during evaluation: PACU  Patient participation: complete - patient cannot participate  Level of consciousness: obtunded/minimal responses  Pain management: adequate  Airway patency: patent  Anesthetic complications: No anesthetic complications  PONV Status: NA  Cardiovascular status: acceptable  Respiratory status: acceptable, ETT, intubated and ventilator  Hydration status: acceptable

## 2020-03-11 NOTE — ANESTHESIA PROCEDURE NOTES
Central Line      Patient location during procedure: OR  Start time: 3/11/2020 7:14 AM  Stop Time:3/11/2020 7:34 AM  Indications: vascular access  Staff  Anesthesiologist: Diogo Hayes MD  Preanesthetic Checklist  Completed: patient identified, site marked, surgical consent, pre-op evaluation, timeout performed, IV checked, risks and benefits discussed and monitors and equipment checked  Central Line Prep  Sterile Tech:cap, gloves, gown, mask and sterile barriers  Prep: chloraprep  Patient monitoring: blood pressure monitoring, continuous pulse oximetry and EKG  Central Line Procedure  Laterality:right  Location:internal jugular  Catheter Type:[place], Cordis and double lumen  Catheter Size:9 Fr  Guidance:ultrasound guided and landmark technique  PROCEDURE NOTE/ULTRASOUND INTERPRETATION.  Using ultrasound guidance the potential vascular sites for insertion of the catheter were visualized to determine the patency of the vessel to be used for vascular access.  After selecting the appropriate site for insertion, the needle was visualized under ultrasound being inserted into the internal jugular vein, followed by ultrasound confirmation of wire and catheter placement. There were no abnormalities seen on ultrasound; an image was taken; and the patient tolerated the procedure with no complications. Images: still images not obtained  Assessment  Post procedure:biopatch applied, line sutured and occlusive dressing applied  Assessement:blood return through all ports, free fluid flow and Carlos Test  Complications:no  Patient Tolerance:patient tolerated the procedure well with no apparent complications

## 2020-03-11 NOTE — ANESTHESIA PROCEDURE NOTES
Procedure Performed: Emergent/Open-Heart Anesthesia SHARRI     Start Time:        End Time:        General Procedure Information  SHARRI Placed for monitoring purposes only -- This is not a diagnostic SHARRI

## 2020-03-11 NOTE — ANESTHESIA PROCEDURE NOTES
Airway  Urgency: elective    Date/Time: 3/11/2020 7:07 AM  Airway not difficult    General Information and Staff    Patient location during procedure: OR  Anesthesiologist: Diogo Hayes MD    Indications and Patient Condition  Indications for airway management: airway protection    Preoxygenated: yes  MILS maintained throughout  Mask difficulty assessment: 1 - vent by mask    Final Airway Details  Final airway type: endotracheal airway      Successful airway: ETT  Cuffed: yes   Successful intubation technique: direct laryngoscopy  Endotracheal tube insertion site: oral  Blade: Paz  Blade size: 3  ETT size (mm): 8.0  Cormack-Lehane Classification: grade IIa - partial view of glottis  Placement verified by: chest auscultation and capnometry   Measured from: lips  ETT/EBT  to lips (cm): 23  Number of attempts at approach: 1  Assessment: lips, teeth, and gum same as pre-op and atraumatic intubation

## 2020-03-11 NOTE — OP NOTE
[facility] CARDIAC SURGERY OP NOTE    Preop Diagnosis: Severe coronary artery disease.    Postop Diagnosis: Same    Indications: This patient has multivessel coronary disease with an occluded LAD.  It fills by collaterals.  The STS Risk and all risks and alternatives were discussed with the patient and family. Operation was advisable to prolong life and relieve symptoms.They understand and wish to proceed.    Procedure: CABG x5.  Bilateral skeletonized DAE's.  Left DAE to mid LAD.  Right DAE to OM1.  Vein graft to acute marginal branch of the right coronary artery.  Vein graft to RCA.  Vein graft to D1.  Temporary cardiopulmonary bypass.  Antegrade and retrograde cold blood cardioplegia with warm reperfusion.  Neurologic monitoring.  Transesophageal echo.  Bilateral intercostal nerve blocks with Exparel.  Endoscopic vein harvest of the left greater saphenous vein.    Surgeon: Manuel Orourke MD    Assistant: Jessica Monique PA-C.    Anesthesia: GET    Findings : The heart and cardiac chambers were normal.  There was trace mitral incompetence.  There was some plaquing around the innominate artery and arch but this did not affect our surgery.  The arteries were little bit small.  The acute marginal branch was 1.5 mm.  The RCA was 2 mm.  OM1 was 2 mm;the diagonal branch was 1.5 mm and the LAD was 1.5 mm.  Both DAE's were 2 mm with excellent blood flow.  The vein was 4 mm and excellent.    Operative Procedure: A primary median sternotomy was made while the left greater saphenous vein was harvested with the endoscope.  Cardiopulmonary bypass was then established for 68 minutes drifting to 34 °C at appropriate flow rates.  The aorta was crossclamped for 58 minutes and we gave 800 cc of antegrade cold blood cardioplegia then 2 L of retrograde cold blood cardioplegia repeating doses every 10 to 15 minutes to good effect.  3 veins were anastomosed the ascending aorta with 6-0 Prolene  marked with washers.  The veins were then sewn to the acute marginal branch and the RCA with 7-0 Prolene.  The STACIE was then brought through an incision in the right side the pericardium behind the aorta through the transverse sinus and sewn to the marginal branch with 7-0 Prolene.  The last vein was sewn to the diagonal branch with 7-0 Prolene.  The LIMA was then sewn to the LAD with 7-0 Prolene.  A warm dose of retrograde cardioplegia was given and then was strong suction on the aortic needle vent the cross-clamp was released.  He regained spontaneous sinus rhythm.  Cardiopulmonary bypass was then weaned and discontinued.  Decannulation was affected and the usual devices were placed.  Hemostasis was obtained.  5 chest tubes were inserted and we applied vancomycin platelet rich plasma.  The sternum was closed stainless steel wire.  The fascia, soft tissues and skin were closed as usual.  All the grafts lay nicely and all the anastomoses were hemostatic.  The sponge, needle and instrument counts noted to be correct.    Complications: None    Tubes: 5    Epicardial Wires: 3    Blood Loss: Minimal    Aortic cross-clamp time: 58 min    CPB time: 68 min     Specimen: None    Condition: Good      Patient Care Team:  Robert Rapp DO as PCP - General (Internal Medicine)        Manuel Orourke MD  3/11/2020  11:10

## 2020-03-11 NOTE — ANESTHESIA PROCEDURE NOTES
Arterial Line      Patient location during procedure: OR  Start time: 3/11/2020 6:54 AM  Stop Time:3/11/2020 6:56 AM       Line placed for hemodynamic monitoring.  Performed By   Anesthesiologist: Diogo Hayes MD  Preanesthetic Checklist  Completed: patient identified, site marked, surgical consent, pre-op evaluation, timeout performed, IV checked, risks and benefits discussed and monitors and equipment checked  Arterial Line Prep   Sterile Tech: cap, gloves and mask  Prep: ChloraPrep  Patient monitoring: blood pressure monitoring, continuous pulse oximetry and EKG  Arterial Line Procedure   Laterality:left  Location:  radial artery  Catheter size: 20 G   Guidance: palpation technique  Number of attempts: 1  Successful placement: yes  Post Assessment   Dressing Type: biopatch applied, occlusive dressing applied, secured with tape and wrist guard applied.   Complications no  Circ/Move/Sens Assessment: normal and unchanged.   Patient Tolerance: patient tolerated the procedure well with no apparent complications

## 2020-03-12 ENCOUNTER — APPOINTMENT (OUTPATIENT)
Dept: GENERAL RADIOLOGY | Facility: HOSPITAL | Age: 55
End: 2020-03-12

## 2020-03-12 LAB
ALBUMIN SERPL-MCNC: 4.5 G/DL (ref 3.5–5.2)
ANION GAP SERPL CALCULATED.3IONS-SCNC: 10.9 MMOL/L (ref 5–15)
BASOPHILS # BLD AUTO: 0.02 10*3/MM3 (ref 0–0.2)
BASOPHILS NFR BLD AUTO: 0.2 % (ref 0–1.5)
BUN BLD-MCNC: 16 MG/DL (ref 6–20)
BUN/CREAT SERPL: 16 (ref 7–25)
CA-I BLD-MCNC: 5.2 MG/DL (ref 4.6–5.4)
CA-I SERPL ISE-MCNC: 1.29 MMOL/L (ref 1.15–1.35)
CALCIUM SPEC-SCNC: 8.5 MG/DL (ref 8.6–10.5)
CHLORIDE SERPL-SCNC: 108 MMOL/L (ref 98–107)
CO2 SERPL-SCNC: 25.1 MMOL/L (ref 22–29)
CREAT BLD-MCNC: 1 MG/DL (ref 0.76–1.27)
DEPRECATED RDW RBC AUTO: 41.1 FL (ref 37–54)
EOSINOPHIL # BLD AUTO: 0 10*3/MM3 (ref 0–0.4)
EOSINOPHIL NFR BLD AUTO: 0 % (ref 0.3–6.2)
ERYTHROCYTE [DISTWIDTH] IN BLOOD BY AUTOMATED COUNT: 13.2 % (ref 12.3–15.4)
GFR SERPL CREATININE-BSD FRML MDRD: 77 ML/MIN/1.73
GLUCOSE BLD-MCNC: 127 MG/DL (ref 65–99)
GLUCOSE BLDC GLUCOMTR-MCNC: 108 MG/DL (ref 70–130)
GLUCOSE BLDC GLUCOMTR-MCNC: 118 MG/DL (ref 70–130)
GLUCOSE BLDC GLUCOMTR-MCNC: 125 MG/DL (ref 70–130)
GLUCOSE BLDC GLUCOMTR-MCNC: 128 MG/DL (ref 70–130)
GLUCOSE BLDC GLUCOMTR-MCNC: 128 MG/DL (ref 70–130)
GLUCOSE BLDC GLUCOMTR-MCNC: 129 MG/DL (ref 70–130)
GLUCOSE BLDC GLUCOMTR-MCNC: 132 MG/DL (ref 70–130)
GLUCOSE BLDC GLUCOMTR-MCNC: 137 MG/DL (ref 70–130)
GLUCOSE BLDC GLUCOMTR-MCNC: 145 MG/DL (ref 70–130)
HCT VFR BLD AUTO: 30.1 % (ref 37.5–51)
HGB BLD-MCNC: 10.3 G/DL (ref 13–17.7)
IMM GRANULOCYTES # BLD AUTO: 0.04 10*3/MM3 (ref 0–0.05)
IMM GRANULOCYTES NFR BLD AUTO: 0.5 % (ref 0–0.5)
INR PPP: 1.15 (ref 0.9–1.1)
LYMPHOCYTES # BLD AUTO: 0.97 10*3/MM3 (ref 0.7–3.1)
LYMPHOCYTES NFR BLD AUTO: 10.9 % (ref 19.6–45.3)
MAGNESIUM SERPL-MCNC: 2.8 MG/DL (ref 1.6–2.6)
MCH RBC QN AUTO: 29.2 PG (ref 26.6–33)
MCHC RBC AUTO-ENTMCNC: 34.2 G/DL (ref 31.5–35.7)
MCV RBC AUTO: 85.3 FL (ref 79–97)
MONOCYTES # BLD AUTO: 0.59 10*3/MM3 (ref 0.1–0.9)
MONOCYTES NFR BLD AUTO: 6.6 % (ref 5–12)
NEUTROPHILS # BLD AUTO: 7.26 10*3/MM3 (ref 1.7–7)
NEUTROPHILS NFR BLD AUTO: 81.8 % (ref 42.7–76)
NRBC BLD AUTO-RTO: 0 /100 WBC (ref 0–0.2)
PHOSPHATE SERPL-MCNC: 3.3 MG/DL (ref 2.5–4.5)
PLATELET # BLD AUTO: 191 10*3/MM3 (ref 140–450)
PMV BLD AUTO: 9 FL (ref 6–12)
POTASSIUM BLD-SCNC: 4.2 MMOL/L (ref 3.5–5.2)
PROTHROMBIN TIME: 14.4 SECONDS (ref 11.7–14.2)
RBC # BLD AUTO: 3.53 10*6/MM3 (ref 4.14–5.8)
SODIUM BLD-SCNC: 144 MMOL/L (ref 136–145)
WBC NRBC COR # BLD: 8.88 10*3/MM3 (ref 3.4–10.8)

## 2020-03-12 PROCEDURE — 25010000003 CEFAZOLIN IN DEXTROSE 2-4 GM/100ML-% SOLUTION: Performed by: THORACIC SURGERY (CARDIOTHORACIC VASCULAR SURGERY)

## 2020-03-12 PROCEDURE — 97162 PT EVAL MOD COMPLEX 30 MIN: CPT

## 2020-03-12 PROCEDURE — 25010000002 FUROSEMIDE PER 20 MG: Performed by: NURSE PRACTITIONER

## 2020-03-12 PROCEDURE — 93010 ELECTROCARDIOGRAM REPORT: CPT | Performed by: INTERNAL MEDICINE

## 2020-03-12 PROCEDURE — 25010000002 ENOXAPARIN PER 10 MG: Performed by: THORACIC SURGERY (CARDIOTHORACIC VASCULAR SURGERY)

## 2020-03-12 PROCEDURE — 71045 X-RAY EXAM CHEST 1 VIEW: CPT

## 2020-03-12 PROCEDURE — 80069 RENAL FUNCTION PANEL: CPT | Performed by: THORACIC SURGERY (CARDIOTHORACIC VASCULAR SURGERY)

## 2020-03-12 PROCEDURE — 85025 COMPLETE CBC W/AUTO DIFF WBC: CPT | Performed by: THORACIC SURGERY (CARDIOTHORACIC VASCULAR SURGERY)

## 2020-03-12 PROCEDURE — 83735 ASSAY OF MAGNESIUM: CPT | Performed by: THORACIC SURGERY (CARDIOTHORACIC VASCULAR SURGERY)

## 2020-03-12 PROCEDURE — 93005 ELECTROCARDIOGRAM TRACING: CPT | Performed by: THORACIC SURGERY (CARDIOTHORACIC VASCULAR SURGERY)

## 2020-03-12 PROCEDURE — 97110 THERAPEUTIC EXERCISES: CPT

## 2020-03-12 PROCEDURE — 82330 ASSAY OF CALCIUM: CPT | Performed by: THORACIC SURGERY (CARDIOTHORACIC VASCULAR SURGERY)

## 2020-03-12 PROCEDURE — 94799 UNLISTED PULMONARY SVC/PX: CPT

## 2020-03-12 PROCEDURE — 99232 SBSQ HOSP IP/OBS MODERATE 35: CPT | Performed by: INTERNAL MEDICINE

## 2020-03-12 PROCEDURE — 85610 PROTHROMBIN TIME: CPT | Performed by: THORACIC SURGERY (CARDIOTHORACIC VASCULAR SURGERY)

## 2020-03-12 PROCEDURE — 25010000002 METOCLOPRAMIDE PER 10 MG: Performed by: THORACIC SURGERY (CARDIOTHORACIC VASCULAR SURGERY)

## 2020-03-12 PROCEDURE — 82962 GLUCOSE BLOOD TEST: CPT

## 2020-03-12 RX ORDER — DEXTROSE MONOHYDRATE 25 G/50ML
25 INJECTION, SOLUTION INTRAVENOUS
Status: DISCONTINUED | OUTPATIENT
Start: 2020-03-12 | End: 2020-03-14

## 2020-03-12 RX ORDER — MULTIPLE VITAMINS W/ MINERALS TAB 9MG-400MCG
1 TAB ORAL DAILY
Status: DISCONTINUED | OUTPATIENT
Start: 2020-03-12 | End: 2020-03-15 | Stop reason: HOSPADM

## 2020-03-12 RX ORDER — BUPROPION HYDROCHLORIDE 300 MG/1
300 TABLET ORAL DAILY
Status: DISCONTINUED | OUTPATIENT
Start: 2020-03-12 | End: 2020-03-15 | Stop reason: HOSPADM

## 2020-03-12 RX ORDER — NICOTINE POLACRILEX 4 MG
15 LOZENGE BUCCAL
Status: DISCONTINUED | OUTPATIENT
Start: 2020-03-12 | End: 2020-03-14

## 2020-03-12 RX ORDER — GUAIFENESIN 600 MG/1
1200 TABLET, EXTENDED RELEASE ORAL EVERY 12 HOURS SCHEDULED
Status: DISCONTINUED | OUTPATIENT
Start: 2020-03-12 | End: 2020-03-15 | Stop reason: HOSPADM

## 2020-03-12 RX ORDER — POTASSIUM CHLORIDE 750 MG/1
20 CAPSULE, EXTENDED RELEASE ORAL ONCE
Status: COMPLETED | OUTPATIENT
Start: 2020-03-12 | End: 2020-03-12

## 2020-03-12 RX ORDER — IPRATROPIUM BROMIDE AND ALBUTEROL SULFATE 2.5; .5 MG/3ML; MG/3ML
3 SOLUTION RESPIRATORY (INHALATION) EVERY 4 HOURS PRN
Status: DISCONTINUED | OUTPATIENT
Start: 2020-03-12 | End: 2020-03-15 | Stop reason: HOSPADM

## 2020-03-12 RX ORDER — FUROSEMIDE 10 MG/ML
40 INJECTION INTRAMUSCULAR; INTRAVENOUS ONCE
Status: COMPLETED | OUTPATIENT
Start: 2020-03-12 | End: 2020-03-12

## 2020-03-12 RX ADMIN — ACETAMINOPHEN ORAL SOLUTION 650 MG: 325 SOLUTION ORAL at 01:01

## 2020-03-12 RX ADMIN — GUAIFENESIN 1200 MG: 600 TABLET, EXTENDED RELEASE ORAL at 20:21

## 2020-03-12 RX ADMIN — GUAIFENESIN 1200 MG: 600 TABLET, EXTENDED RELEASE ORAL at 11:44

## 2020-03-12 RX ADMIN — CHLORHEXIDINE GLUCONATE 15 ML: 1.2 RINSE ORAL at 18:34

## 2020-03-12 RX ADMIN — HYDROCODONE BITARTRATE AND ACETAMINOPHEN 2 TABLET: 5; 325 TABLET ORAL at 16:41

## 2020-03-12 RX ADMIN — MULTIPLE VITAMINS W/ MINERALS TAB 1 TABLET: TAB at 14:36

## 2020-03-12 RX ADMIN — METOCLOPRAMIDE 10 MG: 5 INJECTION, SOLUTION INTRAMUSCULAR; INTRAVENOUS at 05:58

## 2020-03-12 RX ADMIN — CYCLOBENZAPRINE 10 MG: 10 TABLET, FILM COATED ORAL at 14:37

## 2020-03-12 RX ADMIN — METOPROLOL TARTRATE 25 MG: 25 TABLET ORAL at 20:21

## 2020-03-12 RX ADMIN — ATORVASTATIN CALCIUM 40 MG: 20 TABLET, FILM COATED ORAL at 20:21

## 2020-03-12 RX ADMIN — HYDROCODONE BITARTRATE AND ACETAMINOPHEN 2 TABLET: 5; 325 TABLET ORAL at 11:44

## 2020-03-12 RX ADMIN — CEFAZOLIN SODIUM 2 G: 2 INJECTION, SOLUTION INTRAVENOUS at 11:44

## 2020-03-12 RX ADMIN — ACETAMINOPHEN ORAL SOLUTION 650 MG: 325 SOLUTION ORAL at 05:58

## 2020-03-12 RX ADMIN — MUPIROCIN 1 APPLICATION: 20 OINTMENT TOPICAL at 08:03

## 2020-03-12 RX ADMIN — METOCLOPRAMIDE 10 MG: 5 INJECTION, SOLUTION INTRAMUSCULAR; INTRAVENOUS at 01:01

## 2020-03-12 RX ADMIN — FUROSEMIDE 40 MG: 10 INJECTION, SOLUTION INTRAMUSCULAR; INTRAVENOUS at 08:03

## 2020-03-12 RX ADMIN — OXYCODONE HYDROCHLORIDE 10 MG: 5 TABLET ORAL at 05:58

## 2020-03-12 RX ADMIN — PANTOPRAZOLE SODIUM 40 MG: 40 TABLET, DELAYED RELEASE ORAL at 05:58

## 2020-03-12 RX ADMIN — POTASSIUM CHLORIDE 20 MEQ: 10 CAPSULE, COATED, EXTENDED RELEASE ORAL at 08:03

## 2020-03-12 RX ADMIN — ENOXAPARIN SODIUM 40 MG: 40 INJECTION SUBCUTANEOUS at 18:34

## 2020-03-12 RX ADMIN — DOCUSATE SODIUM 50MG AND SENNOSIDES 8.6MG 2 TABLET: 8.6; 5 TABLET, FILM COATED ORAL at 20:21

## 2020-03-12 RX ADMIN — MUPIROCIN 1 APPLICATION: 20 OINTMENT TOPICAL at 20:21

## 2020-03-12 RX ADMIN — CHLORHEXIDINE GLUCONATE 15 ML: 1.2 RINSE ORAL at 01:13

## 2020-03-12 RX ADMIN — CEFAZOLIN SODIUM 2 G: 2 INJECTION, SOLUTION INTRAVENOUS at 18:35

## 2020-03-12 RX ADMIN — CEFAZOLIN SODIUM 2 G: 2 INJECTION, SOLUTION INTRAVENOUS at 03:09

## 2020-03-12 RX ADMIN — BUPROPION HYDROCHLORIDE 300 MG: 300 TABLET, EXTENDED RELEASE ORAL at 14:37

## 2020-03-12 RX ADMIN — ASPIRIN 81 MG: 81 TABLET, COATED ORAL at 08:03

## 2020-03-12 NOTE — PLAN OF CARE
Problem: Patient Care Overview  Goal: Plan of Care Review  Flowsheets  Taken 3/12/2020 0814  Plan of Care Reviewed With: patient (Pended)  Taken 3/12/2020 0635  Outcome Summary: Pt is a 55 y/o M who presents to therapy following post-op CABG x5. Pt is doing well and was able to ambulate and perform transfers with Min A. Pt does exhibit deficits in strength, endurance, and balance that impact gait and functional mobility. PT will follow pt to address these deficits. PT anticipates pt will be able to discharge home with home health, but will continue to monitor pt progress to plan appropriately.  (Pended)

## 2020-03-12 NOTE — PROGRESS NOTES
Discharge Planning Assessment  [facility]     Patient Name: Prosper Darling  MRN: [id number]  Today's Date: 3/12/2020    Admit Date: 3/11/2020    Discharge Needs Assessment     Row Name 03/12/20 1718       Living Environment    Lives With  spouse    Name(s) of Who Lives With Patient  Suzie Lisset, spouse    Current Living Arrangements  home/apartment/condo    Primary Care Provided by  self    Provides Primary Care For  pet(s) 1 dog    Family Caregiver if Needed  spouse    Family Caregiver Names  Suzie, spouse; dtr and son-in-law    Quality of Family Relationships  helpful;supportive;involved    Able to Return to Prior Arrangements  yes       Resource/Environmental Concerns    Resource/Environmental Concerns  none       Transition Planning    Patient/Family Anticipates Transition to  home with family    Patient/Family Anticipated Services at Transition  home health care    Transportation Anticipated  family or friend will provide       Discharge Needs Assessment    Readmission Within the Last 30 Days  no previous admission in last 30 days    Concerns to be Addressed  discharge planning    Equipment Currently Used at Home  none    Anticipated Changes Related to Illness  none    Equipment Needed After Discharge  none    Provided Post Acute Provider List?  Yes    Post Acute Provider List  Home Health    Delivered To  Support Person    Support Person  Suzie, manisha    Method of Delivery  In person    Patient's Choice of Community Agency(s)  Spouse chose [facility]        Discharge Plan     Row Name 03/12/20 1720       Plan    Plan  Home with [facility]    Plan Comments  Spoke with Pt and spouse, Suzie Darling ([phone]) at bedside.  CCP introduced self and role.  Pt confirmed information on face sheet.  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  Pt lives in an apartment with no entrance stair steps.  Pt reports PCP is Robert Rapp.  Pt confirms pharmacy as [facility] [place], [place].  Pt denies issues with affording his medications.  Pt denies past home health, sub-acute rehab & DME.  Pt plans to return home at discharge with assistance of his spouse, daughter and son-in-law.  [facility] was chose to follow Pt at home.  Referral given to Margarita [phone] and she accepted.  CCP will continue to follow…AUSTIN ROGERS/CCP        Destination      Coordination has not been started for this encounter.      Durable Medical Equipment      Coordination has not been started for this encounter.      Dialysis/Infusion      Coordination has not been started for this encounter.      Home Medical Care - Selection Complete      Service Provider Request Status Selected Services Address Phone Number Fax Number    [facility] Selected [facility] Health Services [address], [place] [facility] [address] [phone]    [facility] Declined  cannot accept at this time due to staffing N/A [address] [facility]  [address], [place] [facility] [id number] [phone]    [facility] Declined  Insurance Denial N/A [address] [address] [phone]    [facility] Declined  Insurance Denial N/A [address] [id number] [phone]    [facility]  Patient Insurance Not Accepted N/A [address] [facility] [address] [facility] [address] [place] [id number] [phone]      Therapy      Coordination has not been started for this encounter.      Community Resources      Coordination has not been started for this encounter.          Demographic Summary     Row Name 03/12/20 7218       General Information    Admission Type  inpatient    Arrived From  PACU/recovery room    Referral Source  admission list;physician    Reason for Consult  discharge planning    Preferred Language  English     Used During This Interaction  no         Functional Status     Row Name 03/12/20 1717       Functional Status    Usual Activity Tolerance  good    Current Activity Tolerance  moderate       Functional Status, IADL    Medications  independent    Meal Preparation  independent    Housekeeping  independent    Laundry  independent    Shopping  independent       Mental Status    General Appearance WDL  WDL       Mental Status Summary    Recent Changes in Mental Status/Cognitive Functioning  no changes       Employment/    Employment Status  employed full time        Psychosocial    No documentation.       Abuse/Neglect    No documentation.       Legal    No documentation.       Substance Abuse    No documentation.       Patient Forms    No documentation.           Sunshine Victor RN

## 2020-03-12 NOTE — THERAPY EVALUATION
Patient Name: Prosper Darling  : 1965    MRN: [id number]                              Today's Date: 3/12/2020       Admit Date: 3/11/2020    Visit Dx:     ICD-10-CM ICD-9-CM   1. Coronary artery disease of native heart with stable angina pectoris, unspecified vessel or lesion type (CMS/HCC) I25.118 414.01     413.9     Patient Active Problem List   Diagnosis   • Tobacco abuse   • Essential hypertension   • Hyperlipidemia LDL goal <70   • Unstable angina (CMS/[facility])   • Coronary artery disease of native heart with stable angina pectoris (CMS/[facility])     Past Medical History:   Diagnosis Date   • Enlarged prostate    • Erectile dysfunction    • Fainting spell    • GERD (gastroesophageal reflux disease)    • Hyperlipidemia    • Hypertension    • Peripheral neuropathy    • Slow to wake up after anesthesia      Past Surgical History:   Procedure Laterality Date   • APPENDECTOMY     • CARDIAC CATHETERIZATION N/A 3/6/2020    Procedure: Coronary angiography, Left heart catheterization, Left ventricular angiography;  Surgeon: Carlos Aparicio MD;  Location: [facility] INVASIVE LOCATION;  Service: Cardiology;  Laterality: N/A;   • MOUTH SURGERY       General Information     Row Name 2048          PT Evaluation Time/Intention    Document Type  evaluation  (Pended)   -TW     Mode of Treatment  individual therapy;physical therapy  (Pended)   -TW     Row Name 20          General Information    Prior Level of Function  independent:;gait;transfer;bed mobility  (Pended)   -TW     Existing Precautions/Restrictions  cardiac;fall;sternal  (Pended)   -TW     Barriers to Rehab  medically complex  (Pended)   -TW     Row Name 20          Relationship/Environment    Lives With  spouse  (Pended)   -TW     Row Name 20          Resource/Environmental Concerns    Current Living Arrangements  home/apartment/condo  (Pended)   -TW     Row Name 20          Home Main Entrance    Number  of Stairs, Main Entrance  three  (Pended)   -     Row Name 03/12/20 0848          Stairs Within Home, Primary    Number of Stairs, Within Home, Primary  none  (Pended)   -     Row Name 03/12/20 0848          Cognitive Assessment/Intervention- PT/OT    Orientation Status (Cognition)  oriented x 3  (Pended)   -     Row Name 03/12/20 0848          Safety Issues, Functional Mobility    Impairments Affecting Function (Mobility)  balance;coordination;endurance/activity tolerance;pain;strength  (Pended)   -       User Key  (r) = Recorded By, (t) = Taken By, (c) = Cosigned By    Initials Name Provider Type    TW Piter Gonsalves, PT Student PT Student        Mobility     Row Name 03/12/20 0848          Bed Mobility Assessment/Treatment    Comment (Bed Mobility)  Pt up in chair upon arrival  (Pended)   -     Row Name 03/12/20 0848          Transfer Assessment/Treatment    Comment (Transfers)  Pt did very well with sit to stand transfer. Pt came up slowly, but only required min x2 to achieve standing position. No LOB or unsteadiness noted. Pt cued to not use hands and scoot towards edge of chair.  (Pended)   -     Row Name 03/12/20 0848          Sit-Stand Transfer    Sit-Stand [place] (Transfers)  minimum assist (75% patient effort);2 person assist;verbal cues  (Pended)   -     Row Name 03/12/20 0848          Gait/Stairs Assessment/Training    Gait/Stairs Assessment/Training  gait/ambulation independence  (Pended)   -TW     [place] Level (Gait)  minimum assist (75% patient effort);1 person to manage equipment;2 person assist;verbal cues  (Pended)   -TW     Distance in Feet (Gait)  130  (Pended)   -TW     Pattern (Gait)  step-through  (Pended)   -TW     Deviations/Abnormal Patterns (Gait)  trista decreased;gait speed decreased  (Pended)   -TW     Bilateral Gait Deviations  forward flexed posture  (Pended)   -TW     Comment (Gait/Stairs)  Pt did very well with ambulation and was able to walk with Min A x2.  Pt had good gait pattern, although a little slow. Pt was cued to relax UE and shoulders.   (Pended)   -       User Key  (r) = Recorded By, (t) = Taken By, (c) = Cosigned By    Initials Name Provider Type    Piter Gutierrez, PT Student PT Student        Obj/Interventions     Row Name 03/12/20 0851          General ROM    GENERAL ROM COMMENTS  ROM WFL  (Pended)   -TW     Row Name 03/12/20 0851          MMT (Manual Muscle Testing)    General MMT Comments  strength grossly 4/5  (Pended)   -TW     Row Name 03/12/20 0851          Therapeutic Exercise    Comment (Therapeutic Exercise)  Cardiac protocol x10 reps, cardiac level 3  (Pended)   -TW     Row Name 03/12/20 0851          Static Sitting Balance    Level of [place] (Unsupported Sitting, Static Balance)  supervision  (Pended)   -     Sitting Position (Unsupported Sitting, Static Balance)  sitting in chair  (Pended)   -TW     Row Name 03/12/20 0851          Dynamic Sitting Balance    Level of [place], Reaches Outside Midline (Sitting, Dynamic Balance)  contact guard assist  (Pended)   -     Sitting Position, Reaches Outside Midline (Sitting, Dynamic Balance)  sitting in chair  (Pended)   -TW     Row Name 03/12/20 0851          Static Standing Balance    Level of [place] (Supported Standing, Static Balance)  contact guard assist;1 person assist;1 person to manage equipment  (Pended)   -TW     Row Name 03/12/20 0851          Dynamic Standing Balance    Level of [place], Reaches Outside Midline (Standing, Dynamic Balance)  minimal assist, 75% patient effort;1 person to manage equipment;2 person assist  (Pended)   -       User Key  (r) = Recorded By, (t) = Taken By, (c) = Cosigned By    Initials Name Provider Type    Piter Gutierrez, PT Student PT Student        Goals/Plan     Row Name 03/12/20 0855          Patient Education Goal (PT)    Activity (Patient Education Goal, PT)  Pt to acheive cardiac level 5  (Pended)   -     Time Frame  (Patient Education Goal, PT)  1 week  (Pended)   -TW       User Key  (r) = Recorded By, (t) = Taken By, (c) = Cosigned By    Initials Name Provider Type    Piter Gutierrez, PT Student PT Student        Clinical Impression     Row Name 03/12/20 0852          Pain Assessment    Additional Documentation  Pain Scale: FACES Pre/Post-Treatment (Group)  (Pended)   -     Row Name 03/12/20 0852          Pain Scale: Numbers Pre/Post-Treatment    Pain Location  chest  (Pended)   -TW     Pain Intervention(s)  Repositioned;Ambulation/increased activity  (Pended)   -     Row Name 03/12/20 0852          Pain Scale: FACES Pre/Post-Treatment    Pain: FACES Scale, Pretreatment  2-->hurts little bit  (Pended)   -TW     Pain: FACES Scale, Post-Treatment  2-->hurts little bit  (Pended)   -TW     Pre/Post Treatment Pain Comment  Pt states that he has some discomfort in his chest at his incision   (Pended)   -     Row Name 03/12/20 0852          Plan of Care Review    Plan of Care Reviewed With  patient  (Pended)   -TW     Progress  improving  (Pended)   -TW     Outcome Summary  Pt is a 55 y/o M who presents to therapy following post-op CABG x5. Pt is doing well and was able to ambulate and perform transfers with Min A. Pt does exhibit deficits in strength, endurance, and balance that impact gait and functional mobility. PT will follow pt to address these deficits. PT anticipates pt will be able to discharge home with home health, but will continue to monitor pt progress to plan appropriately.   (Pended)   -     Row Name 03/12/20 0852          Physical Therapy Clinical Impression    Patient/Family Goals Statement (PT Clinical Impression)  Return to PLOF  (Pended)   -TW     Criteria for Skilled Interventions Met (PT Clinical Impression)  yes;treatment indicated  (Pended)   -TW     Rehab Potential (PT Clinical Summary)  good, to achieve stated therapy goals  (Pended)   -     Row Name 03/12/20 0852          Vital Signs    Pre  Systolic BP Rehab  117  (Pended)   -TW     Pre Treatment Diastolic BP  70  (Pended)   -TW     Post Systolic BP Rehab  137  (Pended)   -TW     Post Treatment Diastolic BP  75  (Pended)   -TW     Pretreatment Heart Rate (beats/min)  60  (Pended)   -TW     Posttreatment Heart Rate (beats/min)  60  (Pended)   -TW     Pre SpO2 (%)  97  (Pended)   -TW     O2 Delivery Pre Treatment  supplemental O2  (Pended)   -TW     O2 Delivery Intra Treatment  supplemental O2  (Pended)   -TW     Post SpO2 (%)  98  (Pended)   -TW     O2 Delivery Post Treatment  supplemental O2  (Pended)   -TW     Row Name 03/12/20 0852          Positioning and Restraints    Pre-Treatment Position  sitting in chair/recliner  (Pended)   -TW     Post Treatment Position  chair  (Pended)   -TW     In Chair  reclined;sitting;call light within reach;encouraged to call for assist  (Pended)   -TW       User Key  (r) = Recorded By, (t) = Taken By, (c) = Cosigned By    Initials Name Provider Type    Piter Gutierrez, PT Student PT Student        Outcome Measures     Row Name 03/12/20 0855          How much help from another person do you currently need...    Turning from your back to your side while in flat bed without using bedrails?  2  (Pended)   -TW     Moving from lying on back to sitting on the side of a flat bed without bedrails?  2  (Pended)   -TW     Moving to and from a bed to a chair (including a wheelchair)?  2  (Pended)   -TW     Standing up from a chair using your arms (e.g., wheelchair, bedside chair)?  3  (Pended)   -TW     Climbing 3-5 steps with a railing?  1  (Pended)   -TW     To walk in hospital room?  3  (Pended)   -TW     AM-PAC 6 Clicks Score (PT)  13  (Pended)   -TW     Row Name 03/12/20 0855          Functional Assessment    Outcome Measure Options  AM-PAC 6 Clicks Basic Mobility (PT)  (Pended)   -TW       User Key  (r) = Recorded By, (t) = Taken By, (c) = Cosigned By    Initials Name Provider Type    Piter Gutierrez, PT Student PT  Student          PT Recommendation and Plan  Planned Therapy Interventions (PT Eval): (P) balance training, bed mobility training, gait training, home exercise program, patient/family education, stair training, strengthening, transfer training  Outcome Summary/Treatment Plan (PT)  Anticipated Discharge Disposition (PT): (P) home with home health  Plan of Care Reviewed With: (P) patient  Progress: (P) improving  Outcome Summary: (P) Pt is a 53 y/o M who presents to therapy following post-op CABG x5. Pt is doing well and was able to ambulate and perform transfers with Min A. Pt does exhibit deficits in strength, endurance, and balance that impact gait and functional mobility. PT will follow pt to address these deficits. PT anticipates pt will be able to discharge home with home health, but will continue to monitor pt progress to plan appropriately.      Time Calculation:   PT Charges     Row Name 03/12/20 0856             Time Calculation    Start Time  0830  (Pended)   -TW      Stop Time  0846  (Pended)   -TW      Time Calculation (min)  16 min  (Pended)   -TW      PT Received On  03/12/20  (Pended)   -TW      PT - Next Appointment  03/13/20  (Pended)   -TW      PT Goal Re-Cert Due Date  03/19/20  (Pended)   -TW         Time Calculation- PT    Total Timed Code Minutes- PT  11 minute(s)  (Pended)   -TW        User Key  (r) = Recorded By, (t) = Taken By, (c) = Cosigned By    Initials Name Provider Type    TW Piter Gonsalves, PT Student PT Student        Therapy Charges for Today     Code Description Service Date Service Provider Modifiers Qty    86334011638 HC PT EVAL MOD COMPLEXITY 2 3/12/2020 Piter Gonsalves, PT Student GP 1    18862300147 HC PT THER PROC EA 15 MIN 3/12/2020 Piter Gonsalves, PT Student GP 1    39686263426 HC PT THER SUPP EA 15 MIN 3/12/2020 Piter Gonsalves, PT Student GP 1          PT G-Codes  Outcome Measure Options: (P) AM-PAC 6 Clicks Basic Mobility (PT)  AM-PAC 6 Clicks Score (PT): (P) Ira Dumas  Major, PT Student  3/12/2020

## 2020-03-12 NOTE — DISCHARGE PLACEMENT REQUEST
"Prosper Lucas (54 y.o. Male)     Date of Birth Social Security Number Address Home Phone MRN    1965  [address]  [place] [address] [phone] [id number]    Anglican Marital Status          Baptism        Admission Date Admission Type Admitting Provider Attending Provider Department, Room/Bed    3/11/20 Elective Jr Manuel Orourke MD [place], Jr Samuel B, MD [facility] CARDIOVASC UNIT, 2227/1    Discharge Date Discharge Disposition Discharge Destination                       Attending Provider:  Jr Manuel Orourke MD    Allergies:  No Known Allergies    Isolation:  None   Infection:  None   Code Status:  CPR    Ht:  172.1 cm (67.76\")   Wt:  84.6 kg (186 lb 9 oz)    Admission Cmt:  None   Principal Problem:  Coronary artery disease of native heart with stable angina pectoris (CMS/[facility]) [I25.118] More...                 Active Insurance as of 3/11/2020     Primary Coverage     Payor Plan Insurance Group Employer/Plan Group    [employer] PPO [id number]     Payor Plan Address Payor Plan Phone Number Payor Plan Fax Number Effective Dates    [address] [phone]  4/23/2019 - None Entered    [facility] [address]       Subscriber Name Subscriber Birth Date Member ID       PROSPER LUCAS 1965 [id number]                 Emergency Contacts      (Rel.) Home Phone Work Phone Mobile Phone    Suzie Lucas (Spouse) [phone] -- [phone]              "

## 2020-03-12 NOTE — PROGRESS NOTES
" LOS: 1 day   Patient Care Team:  Robert Rapp DO as PCP - General (Internal Medicine)    Chief Complaint: post op    Subjective:  Symptoms:  He reports chest pain.  No shortness of breath or cough.    Diet:  Poor intake.  No nausea or vomiting.    Activity level: Impaired due to pain.    Pain:  He complains of pain that is mild.  Pain is well controlled and requiring pain medication.      C/o incisional soreness this morning sitting up in the chair    Vital Signs  Heart Rate:  [] 76  Resp:  [16-20] 20  BP: ()/(56-88) 125/70  Arterial Line BP: ()/(45-68) 124/56  FiO2 (%):  [28 %-100 %] 28 %  Body mass index is 28.57 kg/m².    Intake/Output Summary (Last 24 hours) at 3/12/2020 0727  Last data filed at 3/12/2020 0600  Gross per 24 hour   Intake 4749.27 ml   Output 6597 ml   Net -1847.73 ml     No intake/output data recorded.    Chest tube drainage last 8 hours: 30/30/30 03/11/20  0527   Weight: 84.6 kg (186 lb 9 oz)     Objective:  General Appearance:  Comfortable and in no acute distress.    Vital signs: (most recent): Blood pressure 133/48, pulse 89, temperature 98.3 °F (36.8 °C), temperature source Oral, resp. rate 13, height 172.1 cm (67.76\"), weight 84.6 kg (186 lb 9 oz), SpO2 97 %.  Vital signs are normal.  No fever.    Output: Producing urine.    Lungs:  Normal effort and normal respiratory rate.  There are decreased breath sounds.    Heart: Normal rate.  Regular rhythm.  (SR on tele monitor  Pacer set to AAI backup at 60)  Abdomen: Abdomen is soft.  Hypoactive bowel sounds.     Extremities: There is no dependent edema.    Pulses: Distal pulses are intact.    Neurological: Patient is alert and oriented to person, place and time.    Skin:  Warm and dry.  (Surgical dressing clean, dry, and intact)        Results Review:        WBC WBC   Date Value Ref Range Status   03/12/2020 8.88 3.40 - 10.80 10*3/mm3 Final   03/11/2020 15.26 (H) 3.40 - 10.80 10*3/mm3 Final   03/11/2020 13.78 " (H) 3.40 - 10.80 10*3/mm3 Final   03/10/2020 10.13 3.40 - 10.80 10*3/mm3 Final      HGB Hemoglobin   Date Value Ref Range Status   03/12/2020 10.3 (L) 13.0 - 17.7 g/dL Final   03/11/2020 11.9 (L) 13.0 - 17.7 g/dL Final   03/11/2020 12.8 (L) 13.0 - 17.7 g/dL Final   03/10/2020 14.8 13.0 - 17.7 g/dL Final      HCT Hematocrit   Date Value Ref Range Status   03/12/2020 30.1 (L) 37.5 - 51.0 % Final   03/11/2020 34.6 (L) 37.5 - 51.0 % Final   03/11/2020 36.9 (L) 37.5 - 51.0 % Final   03/10/2020 43.1 37.5 - 51.0 % Final      Platelets Platelets   Date Value Ref Range Status   03/12/2020 191 140 - 450 10*3/mm3 Final   03/11/2020 246 140 - 450 10*3/mm3 Final   03/11/2020 255 140 - 450 10*3/mm3 Final   03/10/2020 343 140 - 450 10*3/mm3 Final        PT/INR:    Protime   Date Value Ref Range Status   03/12/2020 14.4 (H) 11.7 - 14.2 Seconds Final   03/11/2020 15.1 (H) 11.7 - 14.2 Seconds Final   03/10/2020 12.5 11.7 - 14.2 Seconds Final   /  INR   Date Value Ref Range Status   03/12/2020 1.15 (H) 0.90 - 1.10 Final   03/11/2020 1.22 (H) 0.90 - 1.10 Final   03/10/2020 0.96 0.90 - 1.10 Final       Sodium Sodium   Date Value Ref Range Status   03/12/2020 144 136 - 145 mmol/L Final   03/11/2020 146 (H) 136 - 145 mmol/L Final   03/11/2020 141 136 - 145 mmol/L Final   03/10/2020 140 136 - 145 mmol/L Final      Potassium Potassium   Date Value Ref Range Status   03/12/2020 4.2 3.5 - 5.2 mmol/L Final   03/11/2020 4.8 3.5 - 5.2 mmol/L Final   03/11/2020 4.8 3.5 - 5.2 mmol/L Final   03/10/2020 4.3 3.5 - 5.2 mmol/L Final      Chloride Chloride   Date Value Ref Range Status   03/12/2020 108 (H) 98 - 107 mmol/L Final   03/11/2020 109 (H) 98 - 107 mmol/L Final   03/11/2020 105 98 - 107 mmol/L Final   03/10/2020 104 98 - 107 mmol/L Final      Bicarbonate CO2   Date Value Ref Range Status   03/12/2020 25.1 22.0 - 29.0 mmol/L Final   03/11/2020 25.5 22.0 - 29.0 mmol/L Final   03/11/2020 23.6 22.0 - 29.0 mmol/L Final   03/10/2020 24.7 22.0 - 29.0  mmol/L Final      BUN BUN   Date Value Ref Range Status   03/12/2020 16 6 - 20 mg/dL Final   03/11/2020 15 6 - 20 mg/dL Final   03/11/2020 15 6 - 20 mg/dL Final   03/10/2020 14 6 - 20 mg/dL Final      Creatinine Creatinine   Date Value Ref Range Status   03/12/2020 1.00 0.76 - 1.27 mg/dL Final   03/11/2020 1.33 (H) 0.76 - 1.27 mg/dL Final   03/11/2020 1.40 (H) 0.76 - 1.27 mg/dL Final   03/10/2020 1.04 0.76 - 1.27 mg/dL Final      Calcium Calcium   Date Value Ref Range Status   03/12/2020 8.5 (L) 8.6 - 10.5 mg/dL Final   03/11/2020 8.8 8.6 - 10.5 mg/dL Final   03/11/2020 8.6 8.6 - 10.5 mg/dL Final   03/10/2020 9.6 8.6 - 10.5 mg/dL Final      Magnesium Magnesium   Date Value Ref Range Status   03/12/2020 2.8 (H) 1.6 - 2.6 mg/dL Final   03/11/2020 2.7 (H) 1.6 - 2.6 mg/dL Final   03/11/2020 2.8 (H) 1.6 - 2.6 mg/dL Final   03/10/2020 2.1 1.6 - 2.6 mg/dL Final            aspirin 81 mg Oral Daily   atorvastatin 40 mg Oral Nightly   ceFAZolin 2 g Intravenous Q8H   chlorhexidine 15 mL Mouth/Throat Q12H   enoxaparin 40 mg Subcutaneous Daily   magnesium sulfate 1 g Intravenous Q8H   metoprolol tartrate 25 mg Oral Q12H   mupirocin  Each Nare BID   pantoprazole 40 mg Oral Q AM   senna-docusate sodium 2 tablet Oral Nightly       clevidipine 2-32 mg/hr    dexmedetomidine 0.2-1.5 mcg/kg/hr Last Rate: Stopped (03/12/20 0400)   DOPamine 2-20 mcg/kg/min    EPINEPHrine 0.02-0.3 mcg/kg/min    insulin 0-50 Units/hr Last Rate: Stopped (03/12/20 0530)   milrinone 0.25-0.75 mcg/kg/min    niCARdipine 5-15 mg/hr Last Rate: Stopped (03/11/20 1216)   nitroglycerin 5-200 mcg/min    norepinephrine 0.02-0.3 mcg/kg/min    phenylephrine 0.2-3 mcg/kg/min Last Rate: Stopped (03/11/20 1530)   propofol 5-50 mcg/kg/min Last Rate: Stopped (03/11/20 1420)   sodium chloride 30 mL/hr Last Rate: Stopped (03/12/20 0530)   sodium chloride 30 mL/hr Last Rate: 30 mL/hr (03/11/20 2200)   vasopressin 0.02-0.1 Units/min        Patient Active Problem List   Diagnosis  Code   • Tobacco abuse Z72.0   • Essential hypertension I10   • Hyperlipidemia LDL goal <70 E78.5   • Unstable angina (CMS/HCC) I20.0   • Coronary artery disease of native heart with stable angina pectoris (CMS/[facility]) I25.118       Assessment & Plan  -MV CAD s/p CABG x5 BIMA/LSVG POD#1 [place]  -Hyperlipidemia  -Hypertension  -Tobacco abuse- encourage cessation  - post op anemia- expected acute blood loss    Looks good this morning  Continue routine care  Off of all pressors/inotropes  Mobilize  Encourage pulmonary toilet--will add mucinex/duonebs  IV diureses  Transfer to stepdown    Loran Parks, APRN  03/12/20  07:27

## 2020-03-12 NOTE — CONSULTS
Met with patient and his wife, discussed benefits of cardiac rehab. Provided phase II information along with  the contact information for cardiac rehab at [facility].    Explained if receiving home health would not be able to attend cardiac rehab until finished with home health. Instructed to bring a copy of After Visit Summary to initial assessment.

## 2020-03-12 NOTE — PROGRESS NOTES
"Patient Care Team:  Robert Rapp DO as PCP - General (Internal Medicine)    Chief Complaint:  F/u CABG, CAD    Interval History:   Sore this am. No shortness of breath    Objective   Vital Signs  Heart Rate:  [] 89  Resp:  [13-20] 13  BP: ()/(48-88) 133/48  Arterial Line BP: ()/(45-68) 124/56  FiO2 (%):  [28 %-100 %] 28 %    Intake/Output Summary (Last 24 hours) at 3/12/2020 0748  Last data filed at 3/12/2020 0600  Gross per 24 hour   Intake 4749.27 ml   Output 6597 ml   Net -1847.73 ml     Flowsheet Rows      First Filed Value   Admission Height  172.1 cm (67.76\") Documented at 03/11/2020 0527   Admission Weight  84.6 kg (186 lb 9 oz) Documented at 03/11/2020 0527          General Appearance:    Alert, cooperative, in no acute distress   Head:    Normocephalic, without obvious abnormality, atraumatic       Neck:   No adenopathy, supple, no thyromegaly, no carotid bruit, no    JVD   Lungs:     Clear to auscultation bilaterally, no wheezes, rales, or     rhonchi    Heart:    Normal rate, regular rhythm, no murmur, no rub, no gallop   Chest Wall:    sternotomy   Abdomen:     Normal bowel sounds, soft, nontender, nondistended,            no rebound tenderness   Extremities:   No cyanosis, clubbing, or edema   Pulses:   Pulses palpable and equal bilaterally   Skin:   No bleeding or rash       Neurologic:   Cranial nerves 2 - 12 grossly intact, sensation intact             aspirin 81 mg Oral Daily   atorvastatin 40 mg Oral Nightly   ceFAZolin 2 g Intravenous Q8H   chlorhexidine 15 mL Mouth/Throat Q12H   enoxaparin 40 mg Subcutaneous Daily   furosemide 40 mg Intravenous Once   guaiFENesin 1,200 mg Oral Q12H   magnesium sulfate 1 g Intravenous Q8H   metoprolol tartrate 25 mg Oral Q12H   mupirocin  Each Nare BID   pantoprazole 40 mg Oral Q AM   potassium chloride 20 mEq Oral Once   senna-docusate sodium 2 tablet Oral Nightly         clevidipine 2-32 mg/hr    dexmedetomidine 0.2-1.5 mcg/kg/hr Last " Rate: Stopped (03/12/20 0400)   DOPamine 2-20 mcg/kg/min    EPINEPHrine 0.02-0.3 mcg/kg/min    insulin 0-50 Units/hr Last Rate: Stopped (03/12/20 0530)   milrinone 0.25-0.75 mcg/kg/min    niCARdipine 5-15 mg/hr Last Rate: Stopped (03/11/20 1216)   nitroglycerin 5-200 mcg/min    norepinephrine 0.02-0.3 mcg/kg/min    phenylephrine 0.2-3 mcg/kg/min Last Rate: Stopped (03/11/20 1530)   propofol 5-50 mcg/kg/min Last Rate: Stopped (03/11/20 1420)   sodium chloride 30 mL/hr Last Rate: Stopped (03/12/20 0530)   sodium chloride 30 mL/hr Last Rate: 30 mL/hr (03/11/20 2200)   vasopressin 0.02-0.1 Units/min        Results Review:    Results from last 7 days   Lab Units 03/12/20  0300   SODIUM mmol/L 144   POTASSIUM mmol/L 4.2   CHLORIDE mmol/L 108*   CO2 mmol/L 25.1   BUN mg/dL 16   CREATININE mg/dL 1.00   GLUCOSE mg/dL 127*   CALCIUM mg/dL 8.5*         Results from last 7 days   Lab Units 03/12/20  0300   WBC 10*3/mm3 8.88   HEMOGLOBIN g/dL 10.3*   HEMATOCRIT % 30.1*   PLATELETS 10*3/mm3 191     Results from last 7 days   Lab Units 03/12/20  0300 03/11/20  1200 03/10/20  0838   INR  1.15* 1.22* 0.96   APTT seconds  --  37.2* 42.8*     Results from last 7 days   Lab Units 03/10/20  0839   CHOLESTEROL mg/dL 171     Results from last 7 days   Lab Units 03/12/20  0300   MAGNESIUM mg/dL 2.8*     Results from last 7 days   Lab Units 03/10/20  0839   CHOLESTEROL mg/dL 171   TRIGLYCERIDES mg/dL 193*   HDL CHOL mg/dL 27*   LDL CHOL mg/dL 105*     @LABRCNT(bnp)@  I reviewed the patient's new clinical results.  I personally viewed and interpreted the patient's EKG/Telemetry data            Assessment/Plan   -Multi-vessel CAD s/p CABG x5 BIMA/LSVG POD#1 [place]  -Hyperlipidemia  -Hypertension  -Tobacco abuse  -post op anemia- expected acute blood loss    -Patient is off vasopressors  -OK for movement to the floor today  -Aspirin and statin  -Metoprolol with hold parameters

## 2020-03-13 ENCOUNTER — APPOINTMENT (OUTPATIENT)
Dept: GENERAL RADIOLOGY | Facility: HOSPITAL | Age: 55
End: 2020-03-13

## 2020-03-13 LAB
ANION GAP SERPL CALCULATED.3IONS-SCNC: 12.2 MMOL/L (ref 5–15)
BASE EXCESS BLDA CALC-SCNC: -1 MMOL/L (ref -5–5)
BASE EXCESS BLDA CALC-SCNC: -1 MMOL/L (ref -5–5)
BASE EXCESS BLDA CALC-SCNC: -2 MMOL/L (ref -5–5)
BASE EXCESS BLDA CALC-SCNC: -4 MMOL/L (ref -5–5)
BASE EXCESS BLDA CALC-SCNC: 1 MMOL/L (ref -5–5)
BASE EXCESS BLDA CALC-SCNC: 4 MMOL/L (ref -5–5)
BASE EXCESS BLDA CALC-SCNC: 6 MMOL/L (ref -5–5)
BUN BLD-MCNC: 19 MG/DL (ref 6–20)
BUN/CREAT SERPL: 19 (ref 7–25)
CALCIUM SPEC-SCNC: 8.9 MG/DL (ref 8.6–10.5)
CHLORIDE SERPL-SCNC: 105 MMOL/L (ref 98–107)
CO2 BLDA-SCNC: 24 MMOL/L (ref 24–29)
CO2 BLDA-SCNC: 25 MMOL/L (ref 24–29)
CO2 BLDA-SCNC: 26 MMOL/L (ref 24–29)
CO2 BLDA-SCNC: 26 MMOL/L (ref 24–29)
CO2 BLDA-SCNC: 28 MMOL/L (ref 24–29)
CO2 BLDA-SCNC: 29 MMOL/L (ref 24–29)
CO2 BLDA-SCNC: 32 MMOL/L (ref 24–29)
CO2 SERPL-SCNC: 25.8 MMOL/L (ref 22–29)
CREAT BLD-MCNC: 1 MG/DL (ref 0.76–1.27)
DEPRECATED RDW RBC AUTO: 43 FL (ref 37–54)
ERYTHROCYTE [DISTWIDTH] IN BLOOD BY AUTOMATED COUNT: 13.3 % (ref 12.3–15.4)
GFR SERPL CREATININE-BSD FRML MDRD: 77 ML/MIN/1.73
GLUCOSE BLD-MCNC: 119 MG/DL (ref 65–99)
GLUCOSE BLDC GLUCOMTR-MCNC: 100 MG/DL (ref 70–130)
GLUCOSE BLDC GLUCOMTR-MCNC: 103 MG/DL (ref 70–130)
GLUCOSE BLDC GLUCOMTR-MCNC: 103 MG/DL (ref 70–130)
GLUCOSE BLDC GLUCOMTR-MCNC: 105 MG/DL (ref 70–130)
GLUCOSE BLDC GLUCOMTR-MCNC: 106 MG/DL (ref 70–130)
GLUCOSE BLDC GLUCOMTR-MCNC: 111 MG/DL (ref 70–130)
GLUCOSE BLDC GLUCOMTR-MCNC: 111 MG/DL (ref 70–130)
GLUCOSE BLDC GLUCOMTR-MCNC: 123 MG/DL (ref 70–130)
GLUCOSE BLDC GLUCOMTR-MCNC: 126 MG/DL (ref 70–130)
GLUCOSE BLDC GLUCOMTR-MCNC: 95 MG/DL (ref 70–130)
GLUCOSE BLDC GLUCOMTR-MCNC: 97 MG/DL (ref 70–130)
HCO3 BLDA-SCNC: 22.8 MMOL/L (ref 22–26)
HCO3 BLDA-SCNC: 23.9 MMOL/L (ref 22–26)
HCO3 BLDA-SCNC: 24.3 MMOL/L (ref 22–26)
HCO3 BLDA-SCNC: 24.8 MMOL/L (ref 22–26)
HCO3 BLDA-SCNC: 26.4 MMOL/L (ref 22–26)
HCO3 BLDA-SCNC: 28.1 MMOL/L (ref 22–26)
HCO3 BLDA-SCNC: 30.7 MMOL/L (ref 22–26)
HCT VFR BLD AUTO: 33.5 % (ref 37.5–51)
HCT VFR BLDA CALC: 26 % (ref 38–51)
HCT VFR BLDA CALC: 27 % (ref 38–51)
HCT VFR BLDA CALC: 28 % (ref 38–51)
HCT VFR BLDA CALC: 29 % (ref 38–51)
HCT VFR BLDA CALC: 36 % (ref 38–51)
HGB BLD-MCNC: 10.9 G/DL (ref 13–17.7)
HGB BLDA-MCNC: 12.2 G/DL (ref 12–17)
HGB BLDA-MCNC: 8.8 G/DL (ref 12–17)
HGB BLDA-MCNC: 9.2 G/DL (ref 12–17)
HGB BLDA-MCNC: 9.5 G/DL (ref 12–17)
HGB BLDA-MCNC: 9.9 G/DL (ref 12–17)
MCH RBC QN AUTO: 28.3 PG (ref 26.6–33)
MCHC RBC AUTO-ENTMCNC: 32.5 G/DL (ref 31.5–35.7)
MCV RBC AUTO: 87 FL (ref 79–97)
PCO2 BLDA: 41 MM HG (ref 35–45)
PCO2 BLDA: 43.9 MM HG (ref 35–45)
PCO2 BLDA: 44.1 MM HG (ref 35–45)
PCO2 BLDA: 46 MM HG (ref 35–45)
PCO2 BLDA: 46.4 MM HG (ref 35–45)
PCO2 BLDA: 49.5 MM HG (ref 35–45)
PCO2 BLDA: 49.9 MM HG (ref 35–45)
PH BLDA: 7.27 PH UNITS (ref 7.35–7.6)
PH BLDA: 7.32 PH UNITS (ref 7.35–7.6)
PH BLDA: 7.35 PH UNITS (ref 7.35–7.6)
PH BLDA: 7.36 PH UNITS (ref 7.35–7.6)
PH BLDA: 7.36 PH UNITS (ref 7.35–7.6)
PH BLDA: 7.4 PH UNITS (ref 7.35–7.6)
PH BLDA: 7.44 PH UNITS (ref 7.35–7.6)
PLATELET # BLD AUTO: 226 10*3/MM3 (ref 140–450)
PMV BLD AUTO: 9.4 FL (ref 6–12)
PO2 BLDA: 111 MMHG (ref 80–105)
PO2 BLDA: 193 MMHG (ref 80–105)
PO2 BLDA: 346 MMHG (ref 80–105)
PO2 BLDA: 396 MMHG (ref 80–105)
PO2 BLDA: 417 MMHG (ref 80–105)
PO2 BLDA: 421 MMHG (ref 80–105)
PO2 BLDA: 44 MMHG (ref 80–105)
POTASSIUM BLD-SCNC: 4.1 MMOL/L (ref 3.5–5.2)
POTASSIUM BLDA-SCNC: 4.1 MMOL/L (ref 3.5–4.9)
POTASSIUM BLDA-SCNC: 4.3 MMOL/L (ref 3.5–4.9)
POTASSIUM BLDA-SCNC: 5 MMOL/L (ref 3.5–4.9)
POTASSIUM BLDA-SCNC: 5.7 MMOL/L (ref 3.5–4.9)
POTASSIUM BLDA-SCNC: 6 MMOL/L (ref 3.5–4.9)
RBC # BLD AUTO: 3.85 10*6/MM3 (ref 4.14–5.8)
SAO2 % BLDA: 100 % (ref 95–98)
SAO2 % BLDA: 76 % (ref 95–98)
SAO2 % BLDA: 98 % (ref 95–98)
SODIUM BLD-SCNC: 143 MMOL/L (ref 136–145)
WBC NRBC COR # BLD: 12.6 10*3/MM3 (ref 3.4–10.8)

## 2020-03-13 PROCEDURE — 82962 GLUCOSE BLOOD TEST: CPT

## 2020-03-13 PROCEDURE — 93005 ELECTROCARDIOGRAM TRACING: CPT | Performed by: THORACIC SURGERY (CARDIOTHORACIC VASCULAR SURGERY)

## 2020-03-13 PROCEDURE — 97110 THERAPEUTIC EXERCISES: CPT

## 2020-03-13 PROCEDURE — 80048 BASIC METABOLIC PNL TOTAL CA: CPT | Performed by: THORACIC SURGERY (CARDIOTHORACIC VASCULAR SURGERY)

## 2020-03-13 PROCEDURE — 25010000003 CEFAZOLIN IN DEXTROSE 2-4 GM/100ML-% SOLUTION: Performed by: THORACIC SURGERY (CARDIOTHORACIC VASCULAR SURGERY)

## 2020-03-13 PROCEDURE — 71045 X-RAY EXAM CHEST 1 VIEW: CPT

## 2020-03-13 PROCEDURE — 93010 ELECTROCARDIOGRAM REPORT: CPT | Performed by: INTERNAL MEDICINE

## 2020-03-13 PROCEDURE — 99024 POSTOP FOLLOW-UP VISIT: CPT | Performed by: NURSE PRACTITIONER

## 2020-03-13 PROCEDURE — 99232 SBSQ HOSP IP/OBS MODERATE 35: CPT | Performed by: NURSE PRACTITIONER

## 2020-03-13 PROCEDURE — 85027 COMPLETE CBC AUTOMATED: CPT | Performed by: THORACIC SURGERY (CARDIOTHORACIC VASCULAR SURGERY)

## 2020-03-13 PROCEDURE — 25010000002 ENOXAPARIN PER 10 MG: Performed by: THORACIC SURGERY (CARDIOTHORACIC VASCULAR SURGERY)

## 2020-03-13 RX ORDER — LOSARTAN POTASSIUM 25 MG/1
25 TABLET ORAL
Status: DISCONTINUED | OUTPATIENT
Start: 2020-03-13 | End: 2020-03-15 | Stop reason: HOSPADM

## 2020-03-13 RX ORDER — FUROSEMIDE 40 MG/1
40 TABLET ORAL DAILY
Status: DISCONTINUED | OUTPATIENT
Start: 2020-03-13 | End: 2020-03-15 | Stop reason: HOSPADM

## 2020-03-13 RX ORDER — POTASSIUM CHLORIDE 750 MG/1
20 CAPSULE, EXTENDED RELEASE ORAL DAILY
Status: DISCONTINUED | OUTPATIENT
Start: 2020-03-13 | End: 2020-03-15 | Stop reason: HOSPADM

## 2020-03-13 RX ORDER — METOPROLOL TARTRATE 50 MG/1
50 TABLET, FILM COATED ORAL EVERY 12 HOURS SCHEDULED
Status: DISCONTINUED | OUTPATIENT
Start: 2020-03-13 | End: 2020-03-15 | Stop reason: HOSPADM

## 2020-03-13 RX ORDER — HYDRALAZINE HYDROCHLORIDE 20 MG/ML
10 INJECTION INTRAMUSCULAR; INTRAVENOUS EVERY 6 HOURS PRN
Status: DISCONTINUED | OUTPATIENT
Start: 2020-03-13 | End: 2020-03-15 | Stop reason: HOSPADM

## 2020-03-13 RX ADMIN — GUAIFENESIN 1200 MG: 600 TABLET, EXTENDED RELEASE ORAL at 20:41

## 2020-03-13 RX ADMIN — CYCLOBENZAPRINE 10 MG: 10 TABLET, FILM COATED ORAL at 09:00

## 2020-03-13 RX ADMIN — CEFAZOLIN SODIUM 2 G: 2 INJECTION, SOLUTION INTRAVENOUS at 02:11

## 2020-03-13 RX ADMIN — HYDROCODONE BITARTRATE AND ACETAMINOPHEN 2 TABLET: 5; 325 TABLET ORAL at 11:23

## 2020-03-13 RX ADMIN — FUROSEMIDE 40 MG: 40 TABLET ORAL at 11:22

## 2020-03-13 RX ADMIN — GUAIFENESIN 1200 MG: 600 TABLET, EXTENDED RELEASE ORAL at 09:00

## 2020-03-13 RX ADMIN — MUPIROCIN 1 APPLICATION: 20 OINTMENT TOPICAL at 09:00

## 2020-03-13 RX ADMIN — METOPROLOL TARTRATE 25 MG: 25 TABLET ORAL at 09:00

## 2020-03-13 RX ADMIN — ENOXAPARIN SODIUM 40 MG: 40 INJECTION SUBCUTANEOUS at 18:47

## 2020-03-13 RX ADMIN — MUPIROCIN 1 APPLICATION: 20 OINTMENT TOPICAL at 20:41

## 2020-03-13 RX ADMIN — POTASSIUM CHLORIDE 20 MEQ: 10 CAPSULE, COATED, EXTENDED RELEASE ORAL at 11:22

## 2020-03-13 RX ADMIN — ASPIRIN 81 MG: 81 TABLET, COATED ORAL at 09:00

## 2020-03-13 RX ADMIN — MULTIPLE VITAMINS W/ MINERALS TAB 1 TABLET: TAB at 09:00

## 2020-03-13 RX ADMIN — CHLORHEXIDINE GLUCONATE 15 ML: 1.2 RINSE ORAL at 20:41

## 2020-03-13 RX ADMIN — PANTOPRAZOLE SODIUM 40 MG: 40 TABLET, DELAYED RELEASE ORAL at 05:47

## 2020-03-13 RX ADMIN — METOPROLOL TARTRATE 25 MG: 25 TABLET ORAL at 15:33

## 2020-03-13 RX ADMIN — METOPROLOL TARTRATE 50 MG: 50 TABLET, FILM COATED ORAL at 20:41

## 2020-03-13 RX ADMIN — HYDROCODONE BITARTRATE AND ACETAMINOPHEN 2 TABLET: 5; 325 TABLET ORAL at 02:47

## 2020-03-13 RX ADMIN — CHLORHEXIDINE GLUCONATE 15 ML: 1.2 RINSE ORAL at 18:47

## 2020-03-13 RX ADMIN — HYDROCODONE BITARTRATE AND ACETAMINOPHEN 2 TABLET: 5; 325 TABLET ORAL at 06:38

## 2020-03-13 RX ADMIN — BUPROPION HYDROCHLORIDE 300 MG: 300 TABLET, EXTENDED RELEASE ORAL at 09:00

## 2020-03-13 RX ADMIN — DOCUSATE SODIUM 50MG AND SENNOSIDES 8.6MG 2 TABLET: 8.6; 5 TABLET, FILM COATED ORAL at 20:41

## 2020-03-13 RX ADMIN — ATORVASTATIN CALCIUM 40 MG: 20 TABLET, FILM COATED ORAL at 20:41

## 2020-03-13 RX ADMIN — LOSARTAN POTASSIUM 25 MG: 25 TABLET, FILM COATED ORAL at 11:21

## 2020-03-13 NOTE — PLAN OF CARE
VSS. No c/o pain. Tolerating sips/chips without n/v. Chest tubes secured and intact. Wife at bedside. Questions and concerns answered

## 2020-03-13 NOTE — PROGRESS NOTES
" LOS: 2 days   Patient Care Team:  Robert Rapp DO as PCP - General (Internal Medicine)    Chief Complaint: post op    Subjective:  Symptoms:  He reports cough and chest pain.  No shortness of breath.    Diet:  Poor intake.  No nausea or vomiting.    Activity level: Impaired due to pain.    Pain:  He complains of pain that is mild.  Pain is well controlled and requiring pain medication.      Having some incisional soreness this morning    Vital Signs  Temp:  [97.8 °F (36.6 °C)-98.5 °F (36.9 °C)] 97.8 °F (36.6 °C)  Heart Rate:  [] 94  Resp:  [15-18] 18  BP: (139-162)/(74-96) 154/89  Body mass index is 27.57 kg/m².    Intake/Output Summary (Last 24 hours) at 3/13/2020 0914  Last data filed at 3/13/2020 0700  Gross per 24 hour   Intake 930 ml   Output 1410 ml   Net -480 ml     No intake/output data recorded.    Chest tube drainage last 8 hours: 50/75/20        03/11/20  0527 03/13/20  0636   Weight: 84.6 kg (186 lb 9 oz) 81.6 kg (180 lb)     Objective:  General Appearance:  Comfortable and in no acute distress.    Vital signs: (most recent): Blood pressure 154/89, pulse 94, temperature 97.8 °F (36.6 °C), temperature source Oral, resp. rate 18, height 172.1 cm (67.76\"), weight 81.6 kg (180 lb), SpO2 96 %.  Vital signs are normal.  No fever.    Output: Producing urine.    Lungs:  Normal effort and normal respiratory rate.  There are decreased breath sounds.    Heart: Normal rate.  Regular rhythm.  (SR on tele monitor)  Abdomen: Abdomen is soft.  Bowel sounds are normal.     Extremities: There is no dependent edema.    Pulses: Distal pulses are intact.    Neurological: Patient is alert and oriented to person, place and time.    Skin:  Warm and dry.          Results Review:        WBC WBC   Date Value Ref Range Status   03/13/2020 12.60 (H) 3.40 - 10.80 10*3/mm3 Final   03/12/2020 8.88 3.40 - 10.80 10*3/mm3 Final   03/11/2020 15.26 (H) 3.40 - 10.80 10*3/mm3 Final   03/11/2020 13.78 (H) 3.40 - 10.80 10*3/mm3 " Final      HGB Hemoglobin   Date Value Ref Range Status   03/13/2020 10.9 (L) 13.0 - 17.7 g/dL Final   03/12/2020 10.3 (L) 13.0 - 17.7 g/dL Final   03/11/2020 11.9 (L) 13.0 - 17.7 g/dL Final   03/11/2020 12.8 (L) 13.0 - 17.7 g/dL Final      HCT Hematocrit   Date Value Ref Range Status   03/13/2020 33.5 (L) 37.5 - 51.0 % Final   03/12/2020 30.1 (L) 37.5 - 51.0 % Final   03/11/2020 34.6 (L) 37.5 - 51.0 % Final   03/11/2020 36.9 (L) 37.5 - 51.0 % Final      Platelets Platelets   Date Value Ref Range Status   03/13/2020 226 140 - 450 10*3/mm3 Final   03/12/2020 191 140 - 450 10*3/mm3 Final   03/11/2020 246 140 - 450 10*3/mm3 Final   03/11/2020 255 140 - 450 10*3/mm3 Final        PT/INR:    Protime   Date Value Ref Range Status   03/12/2020 14.4 (H) 11.7 - 14.2 Seconds Final   03/11/2020 15.1 (H) 11.7 - 14.2 Seconds Final   /  INR   Date Value Ref Range Status   03/12/2020 1.15 (H) 0.90 - 1.10 Final   03/11/2020 1.22 (H) 0.90 - 1.10 Final       Sodium Sodium   Date Value Ref Range Status   03/13/2020 143 136 - 145 mmol/L Final   03/12/2020 144 136 - 145 mmol/L Final   03/11/2020 146 (H) 136 - 145 mmol/L Final   03/11/2020 141 136 - 145 mmol/L Final      Potassium Potassium   Date Value Ref Range Status   03/13/2020 4.1 3.5 - 5.2 mmol/L Final   03/12/2020 4.2 3.5 - 5.2 mmol/L Final   03/11/2020 4.8 3.5 - 5.2 mmol/L Final   03/11/2020 4.8 3.5 - 5.2 mmol/L Final      Chloride Chloride   Date Value Ref Range Status   03/13/2020 105 98 - 107 mmol/L Final   03/12/2020 108 (H) 98 - 107 mmol/L Final   03/11/2020 109 (H) 98 - 107 mmol/L Final   03/11/2020 105 98 - 107 mmol/L Final      Bicarbonate CO2   Date Value Ref Range Status   03/13/2020 25.8 22.0 - 29.0 mmol/L Final   03/12/2020 25.1 22.0 - 29.0 mmol/L Final   03/11/2020 25.5 22.0 - 29.0 mmol/L Final   03/11/2020 23.6 22.0 - 29.0 mmol/L Final      BUN BUN   Date Value Ref Range Status   03/13/2020 19 6 - 20 mg/dL Final   03/12/2020 16 6 - 20 mg/dL Final   03/11/2020 15 6 -  20 mg/dL Final   03/11/2020 15 6 - 20 mg/dL Final      Creatinine Creatinine   Date Value Ref Range Status   03/13/2020 1.00 0.76 - 1.27 mg/dL Final   03/12/2020 1.00 0.76 - 1.27 mg/dL Final   03/11/2020 1.33 (H) 0.76 - 1.27 mg/dL Final   03/11/2020 1.40 (H) 0.76 - 1.27 mg/dL Final      Calcium Calcium   Date Value Ref Range Status   03/13/2020 8.9 8.6 - 10.5 mg/dL Final   03/12/2020 8.5 (L) 8.6 - 10.5 mg/dL Final   03/11/2020 8.8 8.6 - 10.5 mg/dL Final   03/11/2020 8.6 8.6 - 10.5 mg/dL Final      Magnesium Magnesium   Date Value Ref Range Status   03/12/2020 2.8 (H) 1.6 - 2.6 mg/dL Final   03/11/2020 2.7 (H) 1.6 - 2.6 mg/dL Final   03/11/2020 2.8 (H) 1.6 - 2.6 mg/dL Final            aspirin 81 mg Oral Daily   atorvastatin 40 mg Oral Nightly   buPROPion  mg Oral Daily   chlorhexidine 15 mL Mouth/Throat Q12H   enoxaparin 40 mg Subcutaneous Daily   furosemide 40 mg Oral Daily   guaiFENesin 1,200 mg Oral Q12H   insulin lispro 0-7 Units Subcutaneous 4x Daily With Meals & Nightly   losartan 25 mg Oral Q24H   metoprolol tartrate 25 mg Oral Q12H   multivitamin with minerals 1 tablet Oral Daily   mupirocin  Each Nare BID   pantoprazole 40 mg Oral Q AM   potassium chloride 20 mEq Oral Daily   senna-docusate sodium 2 tablet Oral Nightly          Patient Active Problem List   Diagnosis Code   • Tobacco abuse Z72.0   • Essential hypertension I10   • Hyperlipidemia LDL goal <70 E78.5   • Unstable angina (CMS/HCC) I20.0   • Coronary artery disease of native heart with stable angina pectoris (CMS/HCC) I25.118       Assessment & Plan   -MV CAD s/p CABG x5 BIMA/LSVG POD#2 [place]  -Hyperlipidemia  -Hypertension  -Tobacco abuse- encourage cessation  - post op anemia- expected acute blood loss  - leukocytosis- probably reactive     Looks good this morning  Continue routine care  Creatinine stable this morning  On 2L NC--wean as able  Will hypertension--will restarted home losartan  Mobilize and encourage pulmonary toilet  Change  diuretics to PO  Discontinue central line and araya catheter  Discontinue chest tubes, leave mario to bulb    Lorna Parks, APRN  03/13/20  09:14

## 2020-03-13 NOTE — PROGRESS NOTES
Hospital Follow Up    LOS:  LOS: 2 days   Patient Name: Prosper Darling  Age/Sex: 54 y.o. male  : 1965  MRN: [id number]    Day of Service: 20   Length of Stay: 2  Encounter Provider: JAIME Rodas  Place of Service: [facility] CARDIOLOGY  Patient Care Team:  Robert Rapp DO as PCP - General (Internal Medicine)    Subjective:     Chief Complaint: Coronary disease status post CABG    Interval History: Complains of inability to take deep breaths due to chest wall soreness.    Objective:     Objective:  Temp:  [97.8 °F (36.6 °C)-98.5 °F (36.9 °C)] 97.8 °F (36.6 °C)  Heart Rate:  [] 94  Resp:  [15-18] 18  BP: (139-162)/(74-96) 154/89     Intake/Output Summary (Last 24 hours) at 3/13/2020 0957  Last data filed at 3/13/2020 0700  Gross per 24 hour   Intake 930 ml   Output 1410 ml   Net -480 ml     Body mass index is 27.57 kg/m².      20  0527 20  0636   Weight: 84.6 kg (186 lb 9 oz) 81.6 kg (180 lb)     Weight change:     Physical Exam:   General Appearance:    Awake alert and oriented in no acute distress.   Color:  Skin:  Neuro:  HEENT:    Lungs:     Pink  Warm and dry  No focal, motor or sensory deficits  Neck supple, pupils equal, round and reactive. No JVD, No Bruit  Diminished in the bases,respirations regular, even and              unlabored    Heart:    Regular rate and rhythm, S1 and S2, no murmur, no gallop, no rub. No edema, DP/PT pulses are 2+   Chest Wall:    No abnormalities observed   Abdomen:     Normal bowel sounds, no masses, no organomegaly, soft        non-tender, non-distended, no guarding, no ascites noted   Extremities:   Moves all extremities well, no edema, no cyanosis, no redness       Lab Review:   Results from last 7 days   Lab Units 20  0345 20  0300  03/10/20  0839   SODIUM mmol/L 143 144   < > 140   POTASSIUM mmol/L 4.1 4.2   < > 4.3   CHLORIDE mmol/L 105 108*   < > 104   CO2 mmol/L 25.8 25.1   <  > 24.7   BUN mg/dL 19 16   < > 14   CREATININE mg/dL 1.00 1.00   < > 1.04   GLUCOSE mg/dL 119* 127*   < > 109*   CALCIUM mg/dL 8.9 8.5*   < > 9.6   AST (SGOT) U/L  --   --   --  16   ALT (SGPT) U/L  --   --   --  17    < > = values in this interval not displayed.         Results from last 7 days   Lab Units 03/13/20  0345 03/12/20  0300   WBC 10*3/mm3 12.60* 8.88   HEMOGLOBIN g/dL 10.9* 10.3*   HEMATOCRIT % 33.5* 30.1*   PLATELETS 10*3/mm3 226 191     Results from last 7 days   Lab Units 03/12/20  0300 03/11/20  1200 03/10/20  0838   INR  1.15* 1.22* 0.96   APTT seconds  --  37.2* 42.8*     Results from last 7 days   Lab Units 03/12/20  0300 03/11/20  1603   MAGNESIUM mg/dL 2.8* 2.7*     Results from last 7 days   Lab Units 03/10/20  0839   CHOLESTEROL mg/dL 171   TRIGLYCERIDES mg/dL 193*   HDL CHOL mg/dL 27*     Results from last 7 days   Lab Units 03/10/20  0839   PROBNP pg/mL 83.5           Current Medications:   Scheduled Meds:  aspirin 81 mg Oral Daily   atorvastatin 40 mg Oral Nightly   buPROPion  mg Oral Daily   chlorhexidine 15 mL Mouth/Throat Q12H   enoxaparin 40 mg Subcutaneous Daily   furosemide 40 mg Oral Daily   guaiFENesin 1,200 mg Oral Q12H   insulin lispro 0-7 Units Subcutaneous 4x Daily With Meals & Nightly   losartan 25 mg Oral Q24H   metoprolol tartrate 25 mg Oral Q12H   multivitamin with minerals 1 tablet Oral Daily   mupirocin  Each Nare BID   pantoprazole 40 mg Oral Q AM   potassium chloride 20 mEq Oral Daily   senna-docusate sodium 2 tablet Oral Nightly     Continuous Infusions:     Allergies:  No Known Allergies    Assessment:   1.  Multivessel CAD status post 5 vessel CABG, LIMA to the mid LAD, STACIE to OM1, SVG to acute marginal, SVG to right coronary, SVG to first diagonal  2.  Hypertension  3.  Hyperlipidemia  4.  Tobacco abuse  5.  Acute blood loss anemia    Plan:   Labs are stable today.  He is a little hypertensive this morning got started on Cozaar and oral Lasix.  Weight is down  3 kg in the last 48 hours.  His resting heart rate is 95 I am also can optimize his beta-blocker a little bit.  JAIME Rodas  03/13/20  09:57  Electronically signed by JAIME Rodas, 03/13/20, 9:57 AM.

## 2020-03-13 NOTE — THERAPY TREATMENT NOTE
Patient Name: Prosper Darling  : 1965    MRN: [id number]                              Today's Date: 3/13/2020       Admit Date: 3/11/2020    Visit Dx:     ICD-10-CM ICD-9-CM   1. S/P CABG (coronary artery bypass graft) Z95.1 V45.81   2. Coronary artery disease of native heart with stable angina pectoris, unspecified vessel or lesion type (CMS/Formerly [facility]) I25.118 414.01     413.9     Patient Active Problem List   Diagnosis   • Tobacco abuse   • Essential hypertension   • Hyperlipidemia LDL goal <70   • Unstable angina (CMS/Formerly [facility])   • Coronary artery disease of native heart with stable angina pectoris (CMS/Formerly [facility])     Past Medical History:   Diagnosis Date   • Enlarged prostate    • Erectile dysfunction    • Fainting spell    • GERD (gastroesophageal reflux disease)    • Hyperlipidemia    • Hypertension    • Peripheral neuropathy    • Slow to wake up after anesthesia      Past Surgical History:   Procedure Laterality Date   • APPENDECTOMY     • CARDIAC CATHETERIZATION N/A 3/6/2020    Procedure: Coronary angiography, Left heart catheterization, Left ventricular angiography;  Surgeon: Carlos Aparicio MD;  Location: [facility] INVASIVE LOCATION;  Service: Cardiology;  Laterality: N/A;   • CORONARY ARTERY BYPASS GRAFT N/A 3/11/2020    Procedure: SHARRI, MEDIAN STERNOTOMY, CORONARY ARTERY BYPASS GRAFTING X 5   UTILIZING MARIEL INTERNAL MAMMARY ARTERIES AND ENDOSCOPICALLY HARVESTED LEFT SAPHENOUS VEIN, PRP;  Surgeon: Jr Manuel Orourke MD;  Location: [facility] OR;  Service: Cardiothoracic;  Laterality: N/A;   • MOUTH SURGERY       General Information     Row Name 2023          PT Evaluation Time/Intention    Document Type  therapy note (daily note)  -AR     Mode of Treatment  physical therapy  -AR     Row Name 20          General Information    Patient Profile Reviewed?  yes  -AR     Existing Precautions/Restrictions  cardiac;fall;sternal  -AR     Row Name 20          Cognitive  Assessment/Intervention- PT/OT    Orientation Status (Cognition)  oriented x 3  -AR     Row Name 03/13/20 0923          Safety Issues, Functional Mobility    Impairments Affecting Function (Mobility)  balance;coordination;endurance/activity tolerance;pain;strength  -AR       User Key  (r) = Recorded By, (t) = Taken By, (c) = Cosigned By    Initials Name Provider Type    AR Patria Olivarez, PT Physical Therapist        Mobility     Row Name 03/13/20 0923          Bed Mobility Assessment/Treatment    Bed Mobility Assessment/Treatment  supine-sit;sit-supine  -AR     Supine-Sit [place] (Bed Mobility)  not tested  -AR     Sit-Supine [place] (Bed Mobility)  minimum assist (75% patient effort);verbal cues  -AR     Assistive Device (Bed Mobility)  bed rails  -AR     Comment (Bed Mobility)  VC for log rolling w/ education  -AR     Row Name 03/13/20 0923          Transfer Assessment/Treatment    Comment (Transfers)  few VC to maintain sternal precautions   -AR     Row Name 03/13/20 0923          Bed-Chair Transfer    Assistive Device (Bed-Chair Transfers)  -- no AD or UE support  -AR     Row Name 03/13/20 0923          Sit-Stand Transfer    Sit-Stand [place] (Transfers)  contact guard  -AR     Row Name 03/13/20 0923          Gait/Stairs Assessment/Training    Gait/Stairs Assessment/Training  gait/ambulation independence  -AR     [place] Level (Gait)  contact guard  -AR     Distance in Feet (Gait)  150, few cues for posture; 3 standing rest breaks.  LImited by pain, SOB  -AR     Pattern (Gait)  step-through  -AR     Deviations/Abnormal Patterns (Gait)  trista decreased;gait speed decreased;antalgic  -AR     Bilateral Gait Deviations  forward flexed posture  -AR       User Key  (r) = Recorded By, (t) = Taken By, (c) = Cosigned By    Initials Name Provider Type    AR Patria Olivarez, PT Physical Therapist        Obj/Interventions     Row Name 03/13/20 0925          Therapeutic Exercise    Comment  (Therapeutic Exercise)  < 5 reps of UE cardiac exercises, limited by pain.  Provided education for HEP.  Pt reports plan to do on his own after chest tubs out (right after PT)  -AR     Row Name 03/13/20 0925          Static Sitting Balance    Level of [place] (Unsupported Sitting, Static Balance)  independent  -AR     Sitting Position (Unsupported Sitting, Static Balance)  sitting on edge of bed  -AR     Time Able to Maintain Position (Unsupported Sitting, Static Balance)  4 to 5 minutes  -AR     Row Name 03/13/20 0925          Dynamic Sitting Balance    Level of [place], Reaches Outside Midline (Sitting, Dynamic Balance)  supervision  -AR     Sitting Position, Reaches Outside Midline (Sitting, Dynamic Balance)  sitting on edge of bed  -AR     Row Name 03/13/20 0925          Static Standing Balance    Level of [place] (Supported Standing, Static Balance)  contact guard assist  -AR     Row Name 03/13/20 0925          Dynamic Standing Balance    Level of [place], Reaches Outside Midline (Standing, Dynamic Balance)  contact guard assist;minimal assist, 75% patient effort  -AR       User Key  (r) = Recorded By, (t) = Taken By, (c) = Cosigned By    Initials Name Provider Type    AR Patria Olivarez, PT Physical Therapist        Goals/Plan    No documentation.       Clinical Impression     Row Name 03/13/20 0926          Pain Assessment    Additional Documentation  Pain Scale: Numbers Pre/Post-Treatment (Group)  -AR     Row Name 03/13/20 0926          Pain Scale: Numbers Pre/Post-Treatment    Pain Scale: Numbers, Pretreatment  5/10  -AR     Pain Scale: Numbers, Post-Treatment  6/10  -AR     Pain Location - Orientation  incisional  -AR     Pain Location  chest  -AR     Pain Intervention(s)  Medication (See MAR);Repositioned  -AR     Row Name 03/13/20 0926          Plan of Care Review    Plan of Care Reviewed With  patient  -AR     Outcome Summary  Progress twoards goals during PT.  Able to ambulate 150'  w/ contact assist, slow antalgic gait pattern requiring few standing rest break.  Reviewed sternal precautions, activity/walking recom and PT POC.  Anticipate DC to home with assist as needed.    -AR     Row Name 03/13/20 0926          Physical Therapy Clinical Impression    Criteria for Skilled Interventions Met (PT Clinical Impression)  yes  -AR     Row Name 03/13/20 0926          Vital Signs    Pre SpO2 (%)  97  -AR     O2 Delivery Pre Treatment  supplemental O2  -AR     O2 Delivery Intra Treatment  supplemental O2  -AR     Post SpO2 (%)  96  -AR     O2 Delivery Post Treatment  supplemental O2  -AR     Recovery Time  vitals stable before and after PT   -AR     Row Name 03/13/20 0926          Positioning and Restraints    Pre-Treatment Position  sitting in chair/recliner  -AR     Post Treatment Position  bed  -AR     In Bed  supine;call light within reach;encouraged to call for assist;exit alarm on  -AR       User Key  (r) = Recorded By, (t) = Taken By, (c) = Cosigned By    Initials Name Provider Type    Patria Cruz, PT Physical Therapist        Outcome Measures     Row Name 03/13/20 0929          How much help from another person do you currently need...    Turning from your back to your side while in flat bed without using bedrails?  3  -AR     Moving from lying on back to sitting on the side of a flat bed without bedrails?  3  -AR     Moving to and from a bed to a chair (including a wheelchair)?  3  -AR     Standing up from a chair using your arms (e.g., wheelchair, bedside chair)?  3  -AR     Climbing 3-5 steps with a railing?  2  -AR     To walk in hospital room?  3  -AR     AM-PAC 6 Clicks Score (PT)  17  -AR     Row Name 03/13/20 0929          Functional Assessment    Outcome Measure Options  AM-PAC 6 Clicks Basic Mobility (PT)  -AR       User Key  (r) = Recorded By, (t) = Taken By, (c) = Cosigned By    Initials Name Provider Type    Patria Cruz, PT Physical Therapist          PT  Recommendation and Plan     Outcome Summary/Treatment Plan (PT)  Anticipated Discharge Disposition (PT): home with assist  Plan of Care Reviewed With: patient  Outcome Summary: Progress twoards goals during PT.  Able to ambulate 150' w/ contact assist, slow antalgic gait pattern requiring few standing rest break.  Reviewed sternal precautions, activity/walking recom and PT POC.  Anticipate DC to home with assist as needed.       Time Calculation:   PT Charges     Row Name 03/13/20 0922             Time Calculation    Start Time  0859  -AR      Stop Time  0922  -AR      Time Calculation (min)  23 min  -AR      PT Received On  03/13/20  -AR      PT - Next Appointment  03/14/20  -AR        User Key  (r) = Recorded By, (t) = Taken By, (c) = Cosigned By    Initials Name Provider Type    Patria Cruz PT Physical Therapist        Therapy Charges for Today     Code Description Service Date Service Provider Modifiers Qty    17853168806 HC PT THER PROC EA 15 MIN 3/13/2020 Patria Olivarez, PT GP 2    28297439806 HC PT THER SUPP EA 15 MIN 3/13/2020 Patria Olivarez, PT GP 2          PT G-Codes  Outcome Measure Options: AM-PAC 6 Clicks Basic Mobility (PT)  AM-PAC 6 Clicks Score (PT): 17    Patria Olivarez PT  3/13/2020

## 2020-03-13 NOTE — PLAN OF CARE
Problem: Patient Care Overview  Goal: Plan of Care Review  Flowsheets (Taken 3/13/2020 0931)  Outcome Summary: Progress twoards goals during PT.  Able to ambulate 150' w/ contact assist, slow antalgic gait pattern requiring few standing rest break.  Reviewed sternal precautions, activity/walking recom and PT POC.  Anticipate DC to home with assist as needed.

## 2020-03-14 ENCOUNTER — APPOINTMENT (OUTPATIENT)
Dept: GENERAL RADIOLOGY | Facility: HOSPITAL | Age: 55
End: 2020-03-14

## 2020-03-14 LAB
ANION GAP SERPL CALCULATED.3IONS-SCNC: 12.2 MMOL/L (ref 5–15)
BUN BLD-MCNC: 24 MG/DL (ref 6–20)
BUN/CREAT SERPL: 31.2 (ref 7–25)
CALCIUM SPEC-SCNC: 9.2 MG/DL (ref 8.6–10.5)
CHLORIDE SERPL-SCNC: 102 MMOL/L (ref 98–107)
CO2 SERPL-SCNC: 24.8 MMOL/L (ref 22–29)
CREAT BLD-MCNC: 0.77 MG/DL (ref 0.76–1.27)
DEPRECATED RDW RBC AUTO: 41.7 FL (ref 37–54)
ERYTHROCYTE [DISTWIDTH] IN BLOOD BY AUTOMATED COUNT: 13.2 % (ref 12.3–15.4)
GFR SERPL CREATININE-BSD FRML MDRD: 105 ML/MIN/1.73
GLUCOSE BLD-MCNC: 101 MG/DL (ref 65–99)
GLUCOSE BLDC GLUCOMTR-MCNC: 107 MG/DL (ref 70–130)
GLUCOSE BLDC GLUCOMTR-MCNC: 109 MG/DL (ref 70–130)
HCT VFR BLD AUTO: 33.6 % (ref 37.5–51)
HGB BLD-MCNC: 11.6 G/DL (ref 13–17.7)
MCH RBC QN AUTO: 29.8 PG (ref 26.6–33)
MCHC RBC AUTO-ENTMCNC: 34.5 G/DL (ref 31.5–35.7)
MCV RBC AUTO: 86.4 FL (ref 79–97)
PLATELET # BLD AUTO: 265 10*3/MM3 (ref 140–450)
PMV BLD AUTO: 9.2 FL (ref 6–12)
POTASSIUM BLD-SCNC: 4.1 MMOL/L (ref 3.5–5.2)
RBC # BLD AUTO: 3.89 10*6/MM3 (ref 4.14–5.8)
SODIUM BLD-SCNC: 139 MMOL/L (ref 136–145)
WBC NRBC COR # BLD: 13.72 10*3/MM3 (ref 3.4–10.8)

## 2020-03-14 PROCEDURE — 80048 BASIC METABOLIC PNL TOTAL CA: CPT | Performed by: THORACIC SURGERY (CARDIOTHORACIC VASCULAR SURGERY)

## 2020-03-14 PROCEDURE — 71046 X-RAY EXAM CHEST 2 VIEWS: CPT

## 2020-03-14 PROCEDURE — 25010000002 ENOXAPARIN PER 10 MG: Performed by: THORACIC SURGERY (CARDIOTHORACIC VASCULAR SURGERY)

## 2020-03-14 PROCEDURE — 97110 THERAPEUTIC EXERCISES: CPT

## 2020-03-14 PROCEDURE — 85027 COMPLETE CBC AUTOMATED: CPT | Performed by: THORACIC SURGERY (CARDIOTHORACIC VASCULAR SURGERY)

## 2020-03-14 PROCEDURE — 82962 GLUCOSE BLOOD TEST: CPT

## 2020-03-14 PROCEDURE — 99024 POSTOP FOLLOW-UP VISIT: CPT | Performed by: NURSE PRACTITIONER

## 2020-03-14 PROCEDURE — 99231 SBSQ HOSP IP/OBS SF/LOW 25: CPT | Performed by: NURSE PRACTITIONER

## 2020-03-14 RX ORDER — SODIUM CHLORIDE 0.9 % (FLUSH) 0.9 %
10 SYRINGE (ML) INJECTION AS NEEDED
Status: DISCONTINUED | OUTPATIENT
Start: 2020-03-14 | End: 2020-03-15 | Stop reason: HOSPADM

## 2020-03-14 RX ORDER — SODIUM CHLORIDE 0.9 % (FLUSH) 0.9 %
10 SYRINGE (ML) INJECTION EVERY 12 HOURS SCHEDULED
Status: DISCONTINUED | OUTPATIENT
Start: 2020-03-14 | End: 2020-03-15 | Stop reason: HOSPADM

## 2020-03-14 RX ORDER — LACTULOSE 10 G/15ML
30 SOLUTION ORAL DAILY
Status: DISCONTINUED | OUTPATIENT
Start: 2020-03-14 | End: 2020-03-15 | Stop reason: HOSPADM

## 2020-03-14 RX ADMIN — GUAIFENESIN 1200 MG: 600 TABLET, EXTENDED RELEASE ORAL at 21:17

## 2020-03-14 RX ADMIN — METOPROLOL TARTRATE 50 MG: 50 TABLET, FILM COATED ORAL at 21:17

## 2020-03-14 RX ADMIN — MULTIPLE VITAMINS W/ MINERALS TAB 1 TABLET: TAB at 10:01

## 2020-03-14 RX ADMIN — LOSARTAN POTASSIUM 25 MG: 25 TABLET, FILM COATED ORAL at 14:38

## 2020-03-14 RX ADMIN — CHLORHEXIDINE GLUCONATE 15 ML: 1.2 RINSE ORAL at 10:02

## 2020-03-14 RX ADMIN — ASPIRIN 81 MG: 81 TABLET, COATED ORAL at 10:02

## 2020-03-14 RX ADMIN — FUROSEMIDE 40 MG: 40 TABLET ORAL at 10:01

## 2020-03-14 RX ADMIN — ENOXAPARIN SODIUM 40 MG: 40 INJECTION SUBCUTANEOUS at 19:02

## 2020-03-14 RX ADMIN — GUAIFENESIN 1200 MG: 600 TABLET, EXTENDED RELEASE ORAL at 10:02

## 2020-03-14 RX ADMIN — MUPIROCIN 1 APPLICATION: 20 OINTMENT TOPICAL at 10:03

## 2020-03-14 RX ADMIN — ACETAMINOPHEN 650 MG: 325 TABLET, FILM COATED ORAL at 06:16

## 2020-03-14 RX ADMIN — HYDROCODONE BITARTRATE AND ACETAMINOPHEN 2 TABLET: 5; 325 TABLET ORAL at 10:04

## 2020-03-14 RX ADMIN — HYDROCODONE BITARTRATE AND ACETAMINOPHEN 2 TABLET: 5; 325 TABLET ORAL at 19:02

## 2020-03-14 RX ADMIN — PANTOPRAZOLE SODIUM 40 MG: 40 TABLET, DELAYED RELEASE ORAL at 06:16

## 2020-03-14 RX ADMIN — SODIUM CHLORIDE, PRESERVATIVE FREE 10 ML: 5 INJECTION INTRAVENOUS at 21:18

## 2020-03-14 RX ADMIN — POTASSIUM CHLORIDE 20 MEQ: 10 CAPSULE, COATED, EXTENDED RELEASE ORAL at 10:01

## 2020-03-14 RX ADMIN — ATORVASTATIN CALCIUM 40 MG: 20 TABLET, FILM COATED ORAL at 21:17

## 2020-03-14 RX ADMIN — BUPROPION HYDROCHLORIDE 300 MG: 300 TABLET, EXTENDED RELEASE ORAL at 10:01

## 2020-03-14 RX ADMIN — METOPROLOL TARTRATE 50 MG: 50 TABLET, FILM COATED ORAL at 10:04

## 2020-03-14 RX ADMIN — HYDROCODONE BITARTRATE AND ACETAMINOPHEN 2 TABLET: 5; 325 TABLET ORAL at 14:38

## 2020-03-14 NOTE — PROGRESS NOTES
[facility]  Cardiology Progress Note    Patient Identification:  Name: Prosper Darling  Age: 54 y.o.  Sex: male  :  1965  MRN: [id number]                 Follow Up / Chief Complaint: Coronary disease status post CABG    Interval History: He states he is feeling better.  Encouraged I-S and working with physical therapy       Subjective: States he has soreness of incisional pain.  Denies chest pain and palpitations      Objective:    Past Medical History:  Past Medical History:   Diagnosis Date   • Enlarged prostate    • Erectile dysfunction    • Fainting spell    • GERD (gastroesophageal reflux disease)    • Hyperlipidemia    • Hypertension    • Peripheral neuropathy    • Slow to wake up after anesthesia      Past Surgical History:  Past Surgical History:   Procedure Laterality Date   • APPENDECTOMY     • CARDIAC CATHETERIZATION N/A 3/6/2020    Procedure: Coronary angiography, Left heart catheterization, Left ventricular angiography;  Surgeon: Carlos Aparicio MD;  Location: [facility] INVASIVE LOCATION;  Service: Cardiology;  Laterality: N/A;   • CORONARY ARTERY BYPASS GRAFT N/A 3/11/2020    Procedure: SHARRI, MEDIAN STERNOTOMY, CORONARY ARTERY BYPASS GRAFTING X 5   UTILIZING MARIEL INTERNAL MAMMARY ARTERIES AND ENDOSCOPICALLY HARVESTED LEFT SAPHENOUS VEIN, PRP;  Surgeon: Jr Manuel Orourke MD;  Location: [facility];  Service: Cardiothoracic;  Laterality: N/A;   • MOUTH SURGERY          Social History:   Social History     Tobacco Use   • Smoking status: Current Every Day Smoker     Packs/day: 1.00     Years: 41.00     Pack years: 41.00   • Smokeless tobacco: Never Used   • Tobacco comment: caffeine use    Substance Use Topics   • Alcohol use: Yes     Alcohol/week: 0.0 - 1.0 standard drinks     Frequency: Monthly or less     Drinks per session: 1 or 2     Comment: occas.      Family History:  History reviewed. No pertinent family history.       Allergies:  No Known  Allergies  Scheduled Meds:    aspirin 81 mg Daily   atorvastatin 40 mg Nightly   buPROPion  mg Daily   chlorhexidine 15 mL Q12H   enoxaparin 40 mg Daily   furosemide 40 mg Daily   guaiFENesin 1,200 mg Q12H   lactulose 30 g Daily   losartan 25 mg Q24H   metoprolol tartrate 50 mg Q12H   multivitamin with minerals 1 tablet Daily   mupirocin  BID   pantoprazole 40 mg Q AM   potassium chloride 20 mEq Daily   senna-docusate sodium 2 tablet Nightly   sodium chloride 10 mL Q12H           INTAKE AND OUTPUT:    Intake/Output Summary (Last 24 hours) at 3/14/2020 1647  Last data filed at 3/14/2020 1100  Gross per 24 hour   Intake 840 ml   Output 750 ml   Net 90 ml       Review of Systems:   GI: Denies abdominal pain, nausea, vomiting  Cardiac: Denies chest pain and palpitations  Pulmonary: someShortness of breath but has improved.  Denies cough    Constitutional:  Temp:  [98.1 °F (36.7 °C)-99.4 °F (37.4 °C)] 98.4 °F (36.9 °C)  Heart Rate:  [] 92  Resp:  [18] 18  BP: ()/(65-79) 114/70    Physical Exam:  General:  Appears in no acute distress, resting in bed  Eyes: EOM normal no conjunctival drainage  HEENT:  No JVD. Thyroid not visibly enlarged. No mucosal pallor or cyanosis  Respiratory: Respirations regular and unlabored at rest. BBS with good air entry in all fields. No crackles, rubs or wheezes auscultated  Cardiovascular: S1S2 Regular rate and rhythm. No murmur, rub or gallop. No carotid bruits. DP/PT pulses +2  . No pretibial pitting edema  Gastrointestinal: Abdomen soft, flat, non tender. Bowel sounds present. No hepatosplenomegaly. No ascites  Musculoskeletal: LUZ x4. No abnormal movements    Neuro: AAO x3 CN II-XII grossly intact  Psych: Mood and affect normal, pleasant and cooperative         Lab Review       Results from last 7 days   Lab Units 03/12/20  0300   MAGNESIUM mg/dL 2.8*     Results from last 7 days   Lab Units 03/14/20  0338   SODIUM mmol/L 139   POTASSIUM mmol/L 4.1   BUN mg/dL 24*  "  CREATININE mg/dL 0.77   CALCIUM mg/dL 9.2       Results from last 7 days   Lab Units 03/14/20  0338 03/13/20  0345 03/12/20  0300   WBC 10*3/mm3 13.72* 12.60* 8.88   HEMOGLOBIN g/dL 11.6* 10.9* 10.3*   HEMATOCRIT % 33.6* 33.5* 30.1*   PLATELETS 10*3/mm3 265 226 191     Results from last 7 days   Lab Units 03/12/20  0300 03/11/20  1200 03/10/20  0838   INR  1.15* 1.22* 0.96   APTT seconds  --  37.2* 42.8*         Assessment/Plans:  1.  Multivessel CAD status post 5 vessel CABG, LIMA to the mid LAD, Rayma to OM1, SVG to acute marginal, SVG to right coronary SVG to first diagonal.  Denies chest pain. SR on monitor  2.  Hypertension: blood pressure shows good control with increase in medication. Controlled  current medications.  3.  Hyperlipidemia: Continue statin  4.  Tobacco abuse  5.  Acute blood loss anemia: Hgb 11.6 today, stable             )3/14/2020  Jessica Roger, JAIME  Weekend coverage    EMR Dragon/Transcription:   \"Dictated utilizing Dragon dictation\".     "

## 2020-03-14 NOTE — PROGRESS NOTES
" LOS: 3 days   Patient Care Team:  Robert Rapp DO as PCP - General (Internal Medicine)    Chief Complaint: post op    Subjective:  Symptoms:  He reports cough and chest pain.  No shortness of breath.    Diet:  Poor intake.  No nausea or vomiting.    Activity level: Impaired due to pain.    Pain:  He complains of pain that is mild.  Pain is well controlled and requiring pain medication.      Complains of incisional pain when coughing.    Vital Signs  Temp:  [98.1 °F (36.7 °C)-99.4 °F (37.4 °C)] 98.4 °F (36.9 °C)  Heart Rate:  [] 95  Resp:  [18] 18  BP: (105-137)/(70-84) 116/70  Body mass index is 27.2 kg/m².    Intake/Output Summary (Last 24 hours) at 3/14/2020 1142  Last data filed at 3/14/2020 0700  Gross per 24 hour   Intake 600 ml   Output 1300 ml   Net -700 ml     No intake/output data recorded.    Chest tube drainage last 8 hours: 50/75/20        03/11/20  0527 03/13/20  0636 03/14/20  0500   Weight: 84.6 kg (186 lb 9 oz) 81.6 kg (180 lb) 80.6 kg (177 lb 9.6 oz)     Objective:  General Appearance:  Comfortable and in no acute distress.    Vital signs: (most recent): Blood pressure 116/70, pulse 95, temperature 98.4 °F (36.9 °C), temperature source Oral, resp. rate 18, height 172.1 cm (67.76\"), weight 80.6 kg (177 lb 9.6 oz), SpO2 96 %.  Vital signs are normal.  No fever.    Output: Producing urine.    Lungs:  Normal effort and normal respiratory rate.  There are decreased breath sounds.    Heart: Normal rate.  Regular rhythm.  (SR on tele monitor)  Abdomen: Abdomen is soft.  Bowel sounds are normal.     Extremities: There is no dependent edema.    Pulses: Distal pulses are intact.    Neurological: Patient is alert and oriented to person, place and time.    Skin:  Warm and dry.          Results Review:        WBC WBC   Date Value Ref Range Status   03/14/2020 13.72 (H) 3.40 - 10.80 10*3/mm3 Final   03/13/2020 12.60 (H) 3.40 - 10.80 10*3/mm3 Final   03/12/2020 8.88 3.40 - 10.80 10*3/mm3 Final "   03/11/2020 15.26 (H) 3.40 - 10.80 10*3/mm3 Final   03/11/2020 13.78 (H) 3.40 - 10.80 10*3/mm3 Final      HGB Hemoglobin   Date Value Ref Range Status   03/14/2020 11.6 (L) 13.0 - 17.7 g/dL Final   03/13/2020 10.9 (L) 13.0 - 17.7 g/dL Final   03/12/2020 10.3 (L) 13.0 - 17.7 g/dL Final   03/11/2020 11.9 (L) 13.0 - 17.7 g/dL Final   03/11/2020 12.8 (L) 13.0 - 17.7 g/dL Final      HCT Hematocrit   Date Value Ref Range Status   03/14/2020 33.6 (L) 37.5 - 51.0 % Final   03/13/2020 33.5 (L) 37.5 - 51.0 % Final   03/12/2020 30.1 (L) 37.5 - 51.0 % Final   03/11/2020 34.6 (L) 37.5 - 51.0 % Final   03/11/2020 36.9 (L) 37.5 - 51.0 % Final      Platelets Platelets   Date Value Ref Range Status   03/14/2020 265 140 - 450 10*3/mm3 Final   03/13/2020 226 140 - 450 10*3/mm3 Final   03/12/2020 191 140 - 450 10*3/mm3 Final   03/11/2020 246 140 - 450 10*3/mm3 Final   03/11/2020 255 140 - 450 10*3/mm3 Final        PT/INR:    Protime   Date Value Ref Range Status   03/12/2020 14.4 (H) 11.7 - 14.2 Seconds Final   03/11/2020 15.1 (H) 11.7 - 14.2 Seconds Final   /  INR   Date Value Ref Range Status   03/12/2020 1.15 (H) 0.90 - 1.10 Final   03/11/2020 1.22 (H) 0.90 - 1.10 Final       Sodium Sodium   Date Value Ref Range Status   03/14/2020 139 136 - 145 mmol/L Final   03/13/2020 143 136 - 145 mmol/L Final   03/12/2020 144 136 - 145 mmol/L Final   03/11/2020 146 (H) 136 - 145 mmol/L Final   03/11/2020 141 136 - 145 mmol/L Final      Potassium Potassium   Date Value Ref Range Status   03/14/2020 4.1 3.5 - 5.2 mmol/L Final   03/13/2020 4.1 3.5 - 5.2 mmol/L Final   03/12/2020 4.2 3.5 - 5.2 mmol/L Final   03/11/2020 4.8 3.5 - 5.2 mmol/L Final   03/11/2020 4.8 3.5 - 5.2 mmol/L Final      Chloride Chloride   Date Value Ref Range Status   03/14/2020 102 98 - 107 mmol/L Final   03/13/2020 105 98 - 107 mmol/L Final   03/12/2020 108 (H) 98 - 107 mmol/L Final   03/11/2020 109 (H) 98 - 107 mmol/L Final   03/11/2020 105 98 - 107 mmol/L Final       Bicarbonate CO2   Date Value Ref Range Status   03/14/2020 24.8 22.0 - 29.0 mmol/L Final   03/13/2020 25.8 22.0 - 29.0 mmol/L Final   03/12/2020 25.1 22.0 - 29.0 mmol/L Final   03/11/2020 25.5 22.0 - 29.0 mmol/L Final   03/11/2020 23.6 22.0 - 29.0 mmol/L Final      BUN BUN   Date Value Ref Range Status   03/14/2020 24 (H) 6 - 20 mg/dL Final   03/13/2020 19 6 - 20 mg/dL Final   03/12/2020 16 6 - 20 mg/dL Final   03/11/2020 15 6 - 20 mg/dL Final   03/11/2020 15 6 - 20 mg/dL Final      Creatinine Creatinine   Date Value Ref Range Status   03/14/2020 0.77 0.76 - 1.27 mg/dL Final   03/13/2020 1.00 0.76 - 1.27 mg/dL Final   03/12/2020 1.00 0.76 - 1.27 mg/dL Final   03/11/2020 1.33 (H) 0.76 - 1.27 mg/dL Final   03/11/2020 1.40 (H) 0.76 - 1.27 mg/dL Final      Calcium Calcium   Date Value Ref Range Status   03/14/2020 9.2 8.6 - 10.5 mg/dL Final   03/13/2020 8.9 8.6 - 10.5 mg/dL Final   03/12/2020 8.5 (L) 8.6 - 10.5 mg/dL Final   03/11/2020 8.8 8.6 - 10.5 mg/dL Final   03/11/2020 8.6 8.6 - 10.5 mg/dL Final      Magnesium Magnesium   Date Value Ref Range Status   03/12/2020 2.8 (H) 1.6 - 2.6 mg/dL Final   03/11/2020 2.7 (H) 1.6 - 2.6 mg/dL Final   03/11/2020 2.8 (H) 1.6 - 2.6 mg/dL Final            aspirin 81 mg Oral Daily   atorvastatin 40 mg Oral Nightly   buPROPion  mg Oral Daily   chlorhexidine 15 mL Mouth/Throat Q12H   enoxaparin 40 mg Subcutaneous Daily   furosemide 40 mg Oral Daily   guaiFENesin 1,200 mg Oral Q12H   insulin lispro 0-7 Units Subcutaneous 4x Daily With Meals & Nightly   lactulose 30 g Oral Daily   losartan 25 mg Oral Q24H   metoprolol tartrate 50 mg Oral Q12H   multivitamin with minerals 1 tablet Oral Daily   mupirocin  Each Nare BID   pantoprazole 40 mg Oral Q AM   potassium chloride 20 mEq Oral Daily   senna-docusate sodium 2 tablet Oral Nightly          Patient Active Problem List   Diagnosis Code   • Tobacco abuse Z72.0   • Essential hypertension I10   • Hyperlipidemia LDL goal <70 E78.5   •  Unstable angina (CMS/HCC) I20.0   • Coronary artery disease of native heart with stable angina pectoris (CMS/HCC) I25.118       Assessment & Plan   -MV CAD s/p CABG x5 BIMA/LSVG POD#3 [place]  -Hyperlipidemia  -Hypertension  -Tobacco abuse- encourage cessation  - post op anemia- expected acute blood loss  - leukocytosis- probably reactive     Continue routine care  Mobilize and encourage pulmonary toilet  Lucius chest tube continues with sanguinous drainage  DC pacing wires  Increase bowel regimen    ELPIDIO AUGUSTIN, APRN  03/14/20  11:42

## 2020-03-14 NOTE — PLAN OF CARE
Problem: Patient Care Overview  Goal: Plan of Care Review  Flowsheets (Taken 3/14/2020 9289)  Progress: improving  Plan of Care Reviewed With: patient  Outcome Summary: VSS. No complaints of pain. CT to bulb. 1L N/C. Unable to wean. No falls. Will continue to monitor closely

## 2020-03-14 NOTE — THERAPY TREATMENT NOTE
Patient Name: Prosper Darling  : 1965    MRN: [id number]                              Today's Date: 3/14/2020       Admit Date: 3/11/2020    Visit Dx:     ICD-10-CM ICD-9-CM   1. S/P CABG (coronary artery bypass graft) Z95.1 V45.81   2. Coronary artery disease of native heart with stable angina pectoris, unspecified vessel or lesion type (CMS/[facility]) I25.118 414.01     413.9     Patient Active Problem List   Diagnosis   • Tobacco abuse   • Essential hypertension   • Hyperlipidemia LDL goal <70   • Unstable angina (CMS/[facility])   • Coronary artery disease of native heart with stable angina pectoris (CMS/[facility])     Past Medical History:   Diagnosis Date   • Enlarged prostate    • Erectile dysfunction    • Fainting spell    • GERD (gastroesophageal reflux disease)    • Hyperlipidemia    • Hypertension    • Peripheral neuropathy    • Slow to wake up after anesthesia      Past Surgical History:   Procedure Laterality Date   • APPENDECTOMY     • CARDIAC CATHETERIZATION N/A 3/6/2020    Procedure: Coronary angiography, Left heart catheterization, Left ventricular angiography;  Surgeon: Carlos Aparicio MD;  Location: [facility] INVASIVE LOCATION;  Service: Cardiology;  Laterality: N/A;   • CORONARY ARTERY BYPASS GRAFT N/A 3/11/2020    Procedure: SHARRI, MEDIAN STERNOTOMY, CORONARY ARTERY BYPASS GRAFTING X 5   UTILIZING MARIEL INTERNAL MAMMARY ARTERIES AND ENDOSCOPICALLY HARVESTED LEFT SAPHENOUS VEIN, PRP;  Surgeon: Jr Manuel Orourke MD;  Location: [facility];  Service: Cardiothoracic;  Laterality: N/A;   • MOUTH SURGERY       General Information     Row Name 20 1009          PT Evaluation Time/Intention    Document Type  therapy note (daily note)  -AR     Mode of Treatment  physical therapy  -AR     Row Name 20 1009          General Information    Patient Profile Reviewed?  yes  -AR     Existing Precautions/Restrictions  cardiac;sternal beginning be up ad cel[place] with spouse today, shankar w/ RN  -AR      Barriers to Rehab  none identified  -AR     Row Name 03/14/20 1009          Relationship/Environment    Lives With  spouse  -AR     Row Name 03/14/20 1009          Resource/Environmental Concerns    Current Living Arrangements  home/apartment/condo  -AR     Row Name 03/14/20 1009          Cognitive Assessment/Intervention- PT/OT    Orientation Status (Cognition)  oriented x 3  -AR       User Key  (r) = Recorded By, (t) = Taken By, (c) = Cosigned By    Initials Name Provider Type    AR Patria Olivarez, PT Physical Therapist        Mobility     Row Name 03/14/20 1010          Bed Mobility Assessment/Treatment    Bed Mobility Assessment/Treatment  supine-sit;sit-supine  -AR     Supine-Sit Mitchell (Bed Mobility)  not tested  -AR     Sit-Supine Mitchell (Bed Mobility)  supervision;verbal cues  -AR     Assistive Device (Bed Mobility)  bed rails  -AR     Comment (Bed Mobility)  VC and education for log rolling  -AR     Row Name 03/14/20 1010          Sit-Stand Transfer    Sit-Stand Mitchell (Transfers)  contact guard;stand by assist  -AR     Row Name 03/14/20 1010          Gait/Stairs Assessment/Training    Gait/Stairs Assessment/Training  gait/ambulation independence  -AR     Mitchell Level (Gait)  stand by assist;contact guard  -AR     Assistive Device (Gait)  -- no AD or UE support  -AR     Distance in Feet (Gait)  360' no LOB or safety concerns. cues for posture   -AR     Pattern (Gait)  step-through  -AR     Deviations/Abnormal Patterns (Gait)  trista decreased;gait speed decreased;antalgic  -AR     Number of Steps (Stairs)  did not attempt steps due to coughing fit before PT, pt worried about needing to sit during coughing fit in [place]  -AR       User Key  (r) = Recorded By, (t) = Taken By, (c) = Cosigned By    Initials Name Provider Type    AR Patria Olivarez, PT Physical Therapist        Obj/Interventions     Row Name 03/14/20 1012          Therapeutic Exercise    Comment (Therapeutic  Exercise)  cardiac exercises 10x w/ ed for HEP  -AR     Row Name 03/14/20 1012          Static Sitting Balance    Level of [place] (Unsupported Sitting, Static Balance)  independent  -AR     Sitting Position (Unsupported Sitting, Static Balance)  sitting on edge of bed  -AR     Row Name 03/14/20 1012          Static Standing Balance    Level of [place] (Supported Standing, Static Balance)  supervision  -AR     Row Name 03/14/20 1012          Dynamic Standing Balance    Level of [place], Reaches Outside Midline (Standing, Dynamic Balance)  standby assist  -AR       User Key  (r) = Recorded By, (t) = Taken By, (c) = Cosigned By    Initials Name Provider Type    Patria Cruz, PT Physical Therapist        Goals/Plan    No documentation.       Clinical Impression     Row Name 03/14/20 1012          Pain Assessment    Additional Documentation  Pain Scale: Numbers Pre/Post-Treatment (Group)  -AR     Row Name 03/14/20 1012          Pain Scale: Numbers Pre/Post-Treatment    Pain Scale: Numbers, Pretreatment  6/10  -AR     Pain Scale: Numbers, Post-Treatment  7/10  -AR     Pain Location - Orientation  incisional  -AR     Pain Location  chest  -AR     Pain Intervention(s)  Medication (See MAR);Repositioned  -AR     Row Name 03/14/20 1012          Plan of Care Review    Outcome Summary  Good progress towards goals during PT.  Able to ambulate 360' w/ contact to stand by assist, no UE support, LOB or safety concerns.  Reviewed sternal precautions, activity/walking recom and PT POC to f/u 3/16.  Pt, spouse and RN okay for pt to begin walking up ad celeste in [place] w/ spouse.    -AR     Row Name 03/14/20 1012          Vital Signs    Pre SpO2 (%)  96  -AR     O2 Delivery Pre Treatment  room air  -AR     O2 Delivery Intra Treatment  room air  -AR     Post SpO2 (%)  95  -AR     O2 Delivery Post Treatment  room air  -AR     Row Name 03/14/20 1012          Positioning and Restraints    Pre-Treatment Position  sitting  in chair/recliner  -AR     Post Treatment Position  bed  -AR     In Bed  notified nsg;supine;call light within reach;encouraged to call for assist;with family/caregiver;with nsg  -AR       User Key  (r) = Recorded By, (t) = Taken By, (c) = Cosigned By    Initials Name Provider Type    Patria Cruz, PT Physical Therapist        Outcome Measures     Row Name 03/14/20 1014          How much help from another person do you currently need...    Turning from your back to your side while in flat bed without using bedrails?  4  -AR     Moving from lying on back to sitting on the side of a flat bed without bedrails?  4  -AR     Moving to and from a bed to a chair (including a wheelchair)?  4  -AR     Standing up from a chair using your arms (e.g., wheelchair, bedside chair)?  4  -AR     Climbing 3-5 steps with a railing?  3  -AR     To walk in hospital room?  3  -AR     AM-PAC 6 Clicks Score (PT)  22  -AR     Row Name 03/14/20 1014          Functional Assessment    Outcome Measure Options  AM-PAC 6 Clicks Basic Mobility (PT)  -AR       User Key  (r) = Recorded By, (t) = Taken By, (c) = Cosigned By    Initials Name Provider Type    Patria Cruz, PT Physical Therapist          PT Recommendation and Plan     Outcome Summary/Treatment Plan (PT)  Anticipated Discharge Disposition (PT): home with assist  Plan of Care Reviewed With: patient  Outcome Summary: Good progress towards goals during PT.  Able to ambulate 360' w/ contact to stand by assist, no UE support, LOB or safety concerns.  Reviewed sternal precautions, activity/walking recom and PT POC to f/u 3/16.  Pt, spouse and RN okay for pt to begin walking up ad celeste in [place] w/ spouse.       Time Calculation:   PT Charges     Row Name 03/14/20 1009             Time Calculation    Start Time  0945  -AR      Stop Time  1009  -AR      Time Calculation (min)  24 min  -AR      PT Received On  03/14/20  -AR      PT - Next Appointment  03/16/20  -AR        User Key  (r)  = Recorded By, (t) = Taken By, (c) = Cosigned By    Initials Name Provider Type    AR Patria Olivarez, PT Physical Therapist        Therapy Charges for Today     Code Description Service Date Service Provider Modifiers Qty    47995915882 HC PT THER PROC EA 15 MIN 3/13/2020 Patria Olivarez, PT GP 2    62473955182 HC PT THER SUPP EA 15 MIN 3/13/2020 Patria Olivarez, PT GP 2    81330569921 HC PT THER PROC EA 15 MIN 3/14/2020 Patria Olivarez, PT GP 2          PT G-Codes  Outcome Measure Options: AM-PAC 6 Clicks Basic Mobility (PT)  AM-PAC 6 Clicks Score (PT): 22    Patria Olivarez PT  3/14/2020

## 2020-03-14 NOTE — PLAN OF CARE
Problem: Patient Care Overview  Goal: Plan of Care Review  Flowsheets (Taken 3/14/2020 1012)  Outcome Summary: Good progress towards goals during PT.  Able to ambulate 360' w/ contact to stand by assist, no UE support, LOB or safety concerns.  Reviewed sternal precautions, activity/walking recom and PT POC to f/u 3/16.  Pt, spouse and RN okay for pt to begin walking up ad celeste in [place] w/ spouse.

## 2020-03-15 VITALS
SYSTOLIC BLOOD PRESSURE: 113 MMHG | OXYGEN SATURATION: 94 % | HEART RATE: 76 BPM | TEMPERATURE: 98 F | WEIGHT: 179.6 LBS | RESPIRATION RATE: 16 BRPM | HEIGHT: 68 IN | BODY MASS INDEX: 27.22 KG/M2 | DIASTOLIC BLOOD PRESSURE: 65 MMHG

## 2020-03-15 PROBLEM — Z95.1 S/P CABG X 5: Status: ACTIVE | Noted: 2020-03-15

## 2020-03-15 LAB
ANION GAP SERPL CALCULATED.3IONS-SCNC: 13.7 MMOL/L (ref 5–15)
BUN BLD-MCNC: 29 MG/DL (ref 6–20)
BUN/CREAT SERPL: 31.2 (ref 7–25)
CALCIUM SPEC-SCNC: 8.5 MG/DL (ref 8.6–10.5)
CHLORIDE SERPL-SCNC: 100 MMOL/L (ref 98–107)
CO2 SERPL-SCNC: 25.3 MMOL/L (ref 22–29)
CREAT BLD-MCNC: 0.93 MG/DL (ref 0.76–1.27)
DEPRECATED RDW RBC AUTO: 39.5 FL (ref 37–54)
ERYTHROCYTE [DISTWIDTH] IN BLOOD BY AUTOMATED COUNT: 12.8 % (ref 12.3–15.4)
GFR SERPL CREATININE-BSD FRML MDRD: 84 ML/MIN/1.73
GLUCOSE BLD-MCNC: 101 MG/DL (ref 65–99)
HCT VFR BLD AUTO: 31.1 % (ref 37.5–51)
HGB BLD-MCNC: 10.4 G/DL (ref 13–17.7)
MCH RBC QN AUTO: 28.6 PG (ref 26.6–33)
MCHC RBC AUTO-ENTMCNC: 33.4 G/DL (ref 31.5–35.7)
MCV RBC AUTO: 85.4 FL (ref 79–97)
PLATELET # BLD AUTO: 263 10*3/MM3 (ref 140–450)
PMV BLD AUTO: 9.2 FL (ref 6–12)
POTASSIUM BLD-SCNC: 3.5 MMOL/L (ref 3.5–5.2)
RBC # BLD AUTO: 3.64 10*6/MM3 (ref 4.14–5.8)
SODIUM BLD-SCNC: 139 MMOL/L (ref 136–145)
WBC NRBC COR # BLD: 10.33 10*3/MM3 (ref 3.4–10.8)

## 2020-03-15 PROCEDURE — 97110 THERAPEUTIC EXERCISES: CPT

## 2020-03-15 PROCEDURE — 80048 BASIC METABOLIC PNL TOTAL CA: CPT | Performed by: THORACIC SURGERY (CARDIOTHORACIC VASCULAR SURGERY)

## 2020-03-15 PROCEDURE — 99231 SBSQ HOSP IP/OBS SF/LOW 25: CPT | Performed by: NURSE PRACTITIONER

## 2020-03-15 PROCEDURE — 85027 COMPLETE CBC AUTOMATED: CPT | Performed by: THORACIC SURGERY (CARDIOTHORACIC VASCULAR SURGERY)

## 2020-03-15 RX ORDER — POTASSIUM CHLORIDE 750 MG/1
20 CAPSULE, EXTENDED RELEASE ORAL DAILY
Qty: 10 CAPSULE | Refills: 0 | Status: SHIPPED | OUTPATIENT
Start: 2020-03-16 | End: 2020-03-23

## 2020-03-15 RX ORDER — METOPROLOL TARTRATE 50 MG/1
50 TABLET, FILM COATED ORAL EVERY 12 HOURS SCHEDULED
Qty: 60 TABLET | Refills: 3 | Status: SHIPPED | OUTPATIENT
Start: 2020-03-15 | End: 2021-11-09

## 2020-03-15 RX ORDER — PSEUDOEPHEDRINE HCL 30 MG
100 TABLET ORAL 2 TIMES DAILY PRN
Start: 2020-03-15 | End: 2020-03-23

## 2020-03-15 RX ORDER — ATORVASTATIN CALCIUM 40 MG/1
40 TABLET, FILM COATED ORAL DAILY
Qty: 30 TABLET | Refills: 3 | Status: SHIPPED | OUTPATIENT
Start: 2020-03-15 | End: 2020-12-17

## 2020-03-15 RX ORDER — HYDROCODONE BITARTRATE AND ACETAMINOPHEN 5; 325 MG/1; MG/1
2 TABLET ORAL EVERY 4 HOURS PRN
Qty: 60 TABLET | Refills: 0 | Status: SHIPPED | OUTPATIENT
Start: 2020-03-15 | End: 2020-03-21

## 2020-03-15 RX ORDER — LOSARTAN POTASSIUM 25 MG/1
25 TABLET ORAL DAILY
Qty: 30 TABLET | Refills: 3 | Status: SHIPPED | OUTPATIENT
Start: 2020-03-15 | End: 2020-04-28 | Stop reason: SDUPTHER

## 2020-03-15 RX ORDER — FUROSEMIDE 40 MG/1
40 TABLET ORAL DAILY
Qty: 10 TABLET | Refills: 0 | Status: SHIPPED | OUTPATIENT
Start: 2020-03-15 | End: 2020-03-23

## 2020-03-15 RX ADMIN — ASPIRIN 81 MG: 81 TABLET, COATED ORAL at 09:11

## 2020-03-15 RX ADMIN — FUROSEMIDE 40 MG: 40 TABLET ORAL at 11:21

## 2020-03-15 RX ADMIN — SODIUM CHLORIDE, PRESERVATIVE FREE 10 ML: 5 INJECTION INTRAVENOUS at 09:12

## 2020-03-15 RX ADMIN — PANTOPRAZOLE SODIUM 40 MG: 40 TABLET, DELAYED RELEASE ORAL at 06:41

## 2020-03-15 RX ADMIN — BUPROPION HYDROCHLORIDE 300 MG: 300 TABLET, EXTENDED RELEASE ORAL at 09:11

## 2020-03-15 RX ADMIN — MUPIROCIN 1 APPLICATION: 20 OINTMENT TOPICAL at 09:11

## 2020-03-15 RX ADMIN — POTASSIUM CHLORIDE 40 MEQ: 10 CAPSULE, COATED, EXTENDED RELEASE ORAL at 06:41

## 2020-03-15 RX ADMIN — POTASSIUM CHLORIDE 20 MEQ: 10 CAPSULE, COATED, EXTENDED RELEASE ORAL at 09:11

## 2020-03-15 RX ADMIN — HYDROCODONE BITARTRATE AND ACETAMINOPHEN 2 TABLET: 5; 325 TABLET ORAL at 06:41

## 2020-03-15 RX ADMIN — HYDROCODONE BITARTRATE AND ACETAMINOPHEN 2 TABLET: 5; 325 TABLET ORAL at 00:50

## 2020-03-15 RX ADMIN — HYDROCODONE BITARTRATE AND ACETAMINOPHEN 2 TABLET: 5; 325 TABLET ORAL at 12:31

## 2020-03-15 RX ADMIN — GUAIFENESIN 1200 MG: 600 TABLET, EXTENDED RELEASE ORAL at 09:11

## 2020-03-15 RX ADMIN — POTASSIUM CHLORIDE 40 MEQ: 10 CAPSULE, COATED, EXTENDED RELEASE ORAL at 11:22

## 2020-03-15 RX ADMIN — LOSARTAN POTASSIUM 25 MG: 25 TABLET, FILM COATED ORAL at 09:11

## 2020-03-15 RX ADMIN — METOPROLOL TARTRATE 50 MG: 50 TABLET, FILM COATED ORAL at 09:11

## 2020-03-15 RX ADMIN — MULTIPLE VITAMINS W/ MINERALS TAB 1 TABLET: TAB at 09:11

## 2020-03-15 NOTE — PLAN OF CARE
VSS. SR. RA. Up with standby assistance. Ambulating around unit with spouse. C/o surgical pain treated per MAR. Last mediastinal tube removed today, per APRN order. Multiple BMs since surgery. CT sutures removed. Extra set of OPAL hose sent home with pt after obtaining their measurements. Incision care performed prior to discharge. Sternal precautions reviewed. Home with home health today, per CT surgery. CTM.

## 2020-03-15 NOTE — THERAPY DISCHARGE NOTE
Patient Name: Prosper Darling  : 1965    MRN: [id number]                              Today's Date: 3/15/2020       Admit Date: 3/11/2020    Visit Dx:     ICD-10-CM ICD-9-CM   1. S/P CABG (coronary artery bypass graft) Z95.1 V45.81   2. Coronary artery disease of native heart with stable angina pectoris, unspecified vessel or lesion type (CMS/[facility]) I25.118 414.01     413.9     Patient Active Problem List   Diagnosis   • Tobacco abuse   • Essential hypertension   • Hyperlipidemia LDL goal <70   • Unstable angina (CMS/[facility])   • Coronary artery disease of native heart with stable angina pectoris (CMS/[facility])   • S/P CABG x 5     Past Medical History:   Diagnosis Date   • Enlarged prostate    • Erectile dysfunction    • Fainting spell    • GERD (gastroesophageal reflux disease)    • Hyperlipidemia    • Hypertension    • Peripheral neuropathy    • Slow to wake up after anesthesia      Past Surgical History:   Procedure Laterality Date   • APPENDECTOMY     • CARDIAC CATHETERIZATION N/A 3/6/2020    Procedure: Coronary angiography, Left heart catheterization, Left ventricular angiography;  Surgeon: Carlos Aparicio MD;  Location: [facility] INVASIVE LOCATION;  Service: Cardiology;  Laterality: N/A;   • CORONARY ARTERY BYPASS GRAFT N/A 3/11/2020    Procedure: SHARRI, MEDIAN STERNOTOMY, CORONARY ARTERY BYPASS GRAFTING X 5   UTILIZING MARIEL INTERNAL MAMMARY ARTERIES AND ENDOSCOPICALLY HARVESTED LEFT SAPHENOUS VEIN, PRP;  Surgeon: Jr Manuel Orourke MD;  Location: [facility];  Service: Cardiothoracic;  Laterality: N/A;   • MOUTH SURGERY       General Information     Row Name 03/15/20 1027          PT Evaluation Time/Intention    Document Type  therapy note (daily note);discharge treatment  -AR     Mode of Treatment  physical therapy  -AR     Row Name 03/15/20 1027          General Information    Patient Profile Reviewed?  yes  -AR     Existing Precautions/Restrictions  cardiac;sternal  -AR     Row Name 03/15/20  1027          Cognitive Assessment/Intervention- PT/OT    Orientation Status (Cognition)  oriented x 3  -AR       User Key  (r) = Recorded By, (t) = Taken By, (c) = Cosigned By    Initials Name Provider Type    AR Patria Olivarez, PT Physical Therapist        Mobility     Row Name 03/15/20 1027          Bed Mobility Assessment/Treatment    Bed Mobility Assessment/Treatment  supine-sit;sit-supine  -AR     Supine-Sit [place] (Bed Mobility)  independent  -AR     Sit-Supine [place] (Bed Mobility)  independent  -AR     Comment (Bed Mobility)  no bed rail, HOB flat  -AR     Row Name 03/15/20 1027          Bed-Chair Transfer    Assistive Device (Bed-Chair Transfers)  -- no AD or UE support  -AR     Row Name 03/15/20 1027          Sit-Stand Transfer    Sit-Stand [place] (Transfers)  supervision  -AR     Row Name 03/15/20 1027          Gait/Stairs Assessment/Training    Gait/Stairs Assessment/Training  gait/ambulation independence  -AR     [place] Level (Gait)  supervision  -AR     Distance in Feet (Gait)  200  -AR     Deviations/Abnormal Patterns (Gait)  trista decreased  -AR     [place] Level (Stairs)  stand by assist  -AR     Handrail Location (Stairs)  right side (ascending)  -AR     Number of Steps (Stairs)  8  -AR     Ascending Technique (Stairs)  step-over-step  -AR     Descending Technique (Stairs)  step-over-step  -AR     Comment (Gait/Stairs)  no LOB or safety concerns  -AR       User Key  (r) = Recorded By, (t) = Taken By, (c) = Cosigned By    Initials Name Provider Type    AR Patria Olivarez PT Physical Therapist        Obj/Interventions     Row Name 03/15/20 1028          Therapeutic Exercise    Comment (Therapeutic Exercise)  reviewed cardiac exercises 10x  -AR     Row Name 03/15/20 1028          Dynamic Standing Balance    Level of [place], Reaches Outside Midline (Standing, Dynamic Balance)  supervision  -AR       User Key  (r) = Recorded By, (t) = Taken By, (c) = Cosigned By     Initials Name Provider Type    AR Patria Olivarez, PT Physical Therapist        Goals/Plan    No documentation.       Clinical Impression     Row Name 03/15/20 1029          Pain Assessment    Additional Documentation  Pain Scale: Numbers Pre/Post-Treatment (Group)  -AR     Row Name 03/15/20 1029          Pain Scale: Numbers Pre/Post-Treatment    Pain Scale: Numbers, Pretreatment  2/10  -AR     Pain Scale: Numbers, Post-Treatment  2/10  -AR     Pain Location - Orientation  incisional  -AR     Pain Location  chest  -AR     Pain Intervention(s)  Medication (See MAR);Repositioned  -AR     Row Name 03/15/20 1029          Plan of Care Review    Outcome Summary  Great progress with activity tolerance and independence w/ mobility during PT today.  Able to ambulate with supervision, somewhat guarded gait pattern but no LOB or safety concerns.  Ascend/descended 8 steps without significant difficulty.  Practiced bed mobility via log rolling, HOB flat no bed rail.  Reviewed sternal precautions, activity/wlaking recom and PT POC to DC PT; pt, spouse and RN agreeable.    -AR     Row Name 03/15/20 1029          Vital Signs    O2 Delivery Pre Treatment  room air  -AR     O2 Delivery Intra Treatment  room air  -AR     O2 Delivery Post Treatment  room air  -AR     Row Name 03/15/20 1029          Positioning and Restraints    Pre-Treatment Position  sitting in chair/recliner  -AR     Post Treatment Position  bed  -AR     In Bed  call light within reach;with family/caregiver;sitting EOB  -AR       User Key  (r) = Recorded By, (t) = Taken By, (c) = Cosigned By    Initials Name Provider Type    AR Patria Olivarez, PT Physical Therapist        Outcome Measures     Row Name 03/15/20 1032          How much help from another person do you currently need...    Turning from your back to your side while in flat bed without using bedrails?  4  -AR     Moving from lying on back to sitting on the side of a flat bed without bedrails?  4  -AR      Moving to and from a bed to a chair (including a wheelchair)?  4  -AR     Standing up from a chair using your arms (e.g., wheelchair, bedside chair)?  4  -AR     Climbing 3-5 steps with a railing?  3  -AR     To walk in hospital room?  4  -AR     AM-PAC 6 Clicks Score (PT)  23  -AR     Row Name 03/15/20 1032          Functional Assessment    Outcome Measure Options  AM-PAC 6 Clicks Basic Mobility (PT)  -AR       User Key  (r) = Recorded By, (t) = Taken By, (c) = Cosigned By    Initials Name Provider Type    AR Patria Olivarez PT Physical Therapist          PT Recommendation and Plan     Outcome Summary/Treatment Plan (PT)  Anticipated Discharge Disposition (PT): home with assist  Plan of Care Reviewed With: patient  Outcome Summary: Great progress with activity tolerance and independence w/ mobility during PT today.  Able to ambulate with supervision, somewhat guarded gait pattern but no LOB or safety concerns.  Ascend/descended 8 steps without significant difficulty.  Practiced bed mobility via log rolling, HOB flat no bed rail.  Reviewed sternal precautions, activity/wlaking recom and PT POC to DC PT; pt, spouse and RN agreeable.       Time Calculation:   PT Charges     Row Name 03/15/20 1026 03/15/20 0813          Time Calculation    Start Time  0936  -AR  --     Stop Time  0948  -AR  --     Time Calculation (min)  12 min  -AR  --     PT Received On  03/15/20  -AR  --     PT - Next Appointment  --  03/15/20  -AR       User Key  (r) = Recorded By, (t) = Taken By, (c) = Cosigned By    Initials Name Provider Type    Patria Cruz PT Physical Therapist        Therapy Charges for Today     Code Description Service Date Service Provider Modifiers Qty    17652394104 HC PT THER PROC EA 15 MIN 3/14/2020 Patria Olivarez PT GP 2    50292747686 HC PT THER PROC EA 15 MIN 3/15/2020 Patria Olivarez PT GP 1          PT G-Codes  Outcome Measure Options: AM-PAC 6 Clicks Basic Mobility (PT)  AM-PAC 6 Clicks Score  (PT): 23    PT Discharge Summary  Anticipated Discharge Disposition (PT): home with assist    Patria Olivarez, PT  3/15/2020

## 2020-03-15 NOTE — DISCHARGE SUMMARY
Date of Admission: 3/11/2020  Date of Discharge: 3/15/2020    Discharge Diagnosis:   MV CAD s/p CABG x5 BIMA/LSVG POD#4 Sharad  Hyperlipidemia  Hypertension  Tobacco abuse   Post op anemia- expected acute blood loss  Leukocytosis- likely reactive    Presenting Problem/History of Present Illness  Coronary artery disease of native heart with stable angina pectoris, unspecified vessel or lesion type (CMS/HCC) [I25.118]  Coronary artery disease of native heart with stable angina pectoris, unspecified vessel or lesion type (CMS/HCC) [I25.118]     Hospital Course  Patient is a 54 y.o. male presented for scheduled CABG with Dr. Orourke on 3/11/2020. He was transferred to the recovery unit in stable condition and extubated overnight. He recovered very well and suffered no postop complications. He is being discharged to home with family and home health in stable condition. He is being discharged on aspirin, statin, and beta blocker. Prescription for Norco given for pain.    Procedures Performed  Procedure(s):  3/11/2020-- SHARRI, MEDIAN STERNOTOMY, CORONARY ARTERY BYPASS GRAFTING X 5   UTILIZING MARIEL INTERNAL MAMMARY ARTERIES AND ENDOSCOPICALLY HARVESTED LEFT SAPHENOUS VEIN, PRP       Consults:   Consults     Date and Time Order Name Status Description    3/11/2020 1145 Inpatient Cardiology Consult            Pertinent Test Results:    Lab Results   Component Value Date    WBC 10.33 03/15/2020    HGB 10.4 (L) 03/15/2020    HCT 31.1 (L) 03/15/2020    MCV 85.4 03/15/2020     03/15/2020      Lab Results   Component Value Date    GLUCOSE 101 (H) 03/15/2020    CALCIUM 8.5 (L) 03/15/2020     03/15/2020    K 3.5 03/15/2020    CO2 25.3 03/15/2020     03/15/2020    BUN 29 (H) 03/15/2020    CREATININE 0.93 03/15/2020    EGFRIFNONA 84 03/15/2020    BCR 31.2 (H) 03/15/2020    ANIONGAP 13.7 03/15/2020     Lab Results   Component Value Date    INR 1.15 (H) 03/12/2020    PROTIME 14.4 (H) 03/12/2020         Condition on  Discharge: Stable for discharge to home with family and home health    Vital Signs  Temp:  [97.6 °F (36.4 °C)-98.4 °F (36.9 °C)] 98 °F (36.7 °C)  Heart Rate:  [] 76  Resp:  [16-18] 16  BP: ()/(63-76) 113/65      Discharge Disposition  Home-Health Care Sv    Discharge Medications     Discharge Medications      New Medications      Instructions Start Date   docusate sodium 100 MG capsule   100 mg, Oral, 2 Times Daily PRN      furosemide 40 MG tablet  Commonly known as:  LASIX   40 mg, Oral, Daily      HYDROcodone-acetaminophen 5-325 MG per tablet  Commonly known as:  NORCO   2 tablets, Oral, Every 4 Hours PRN      metoprolol tartrate 50 MG tablet  Commonly known as:  LOPRESSOR   50 mg, Oral, Every 12 Hours Scheduled      potassium chloride 10 MEQ CR capsule  Commonly known as:  MICRO-K   20 mEq, Oral, Daily   Start Date:  March 16, 2020        Changes to Medications      Instructions Start Date   atorvastatin 40 MG tablet  Commonly known as:  LIPITOR  What changed:    · medication strength  · how much to take   40 mg, Oral, Daily      losartan 25 MG tablet  Commonly known as:  COZAAR  What changed:    · medication strength  · how much to take   25 mg, Oral, Daily         Continue These Medications      Instructions Start Date   aspirin 81 MG tablet   81 mg, Oral, Daily      buPROPion 100 MG tablet  Commonly known as:  WELLBUTRIN   100 mg, Oral, 3 Times Daily      famotidine 20 MG tablet  Commonly known as:  PEPCID   20 mg, Oral, 2 Times Daily      MULTIVITAMIN ADULT PO   1 tablet, Oral, Daily         Stop These Medications    metoprolol succinate XL 25 MG 24 hr tablet  Commonly known as:  TOPROL-XL     nitroglycerin 0.4 MG SL tablet  Commonly known as:  NITROSTAT            Discharge Diet:   Healthy Heart Diet    Activity at Discharge:   As tolerated, no lifting > 10 pounds x 6 weeks    Follow-up Appointments  No future appointments.  Additional Instructions for the Follow-ups that You Need to Schedule      Ambulatory Referral to Cardiac Rehab   As directed      Ambulatory Referral to Cardiac Rehab   As directed      Discharge Follow-up with PCP   As directed       Currently Documented PCP:    Robert Rapp DO    PCP Phone Number:    [phone]     Follow Up Details:  in one month         Discharge Follow-up with Specialty: Cardiology; 2 Weeks   As directed      Specialty:  Cardiology    Follow Up:  2 Weeks    Follow Up Details:  Follow up with cardiologist Dr. Aparicio in 2 weeks.         Discharge Follow-up with Specified Provider: Cardiac Surgery; 6 Weeks   As directed      To:  Cardiac Surgery    Follow Up:  6 Weeks    Follow Up Details:  Follow up with cardiac surgeon Dr. Orourke in 6 weeks.         Referral to Home Health   As directed      Face to Face Visit Date:  3/15/2020    Follow-up provider for Plan of Care?:  I will be treating the patient on an ongoing basis.  Please send me the Plan of Care for signature.    Follow-up provider:  JR EMILEE OROURKE [[id number]]    Reason/Clinical Findings:  S/P Cardiac Surgery    Describe mobility limitations that make leaving home difficult:  S/p Cardiac Surgery    Nursing/Therapeutic Services Requested:  Skilled Nursing    Skilled nursing orders:  Other (Post Cardiac Surgery Care)    Frequency:  Other (3 times weekly)         Call MD With Problems / Concerns   Mar 16, 2020      Instructions: Call for temp >101 or surgical wound drainage    Order Comments:  Instructions: Call for temp >101 or surgical wound drainage                Test Results Pending at Discharge    STSinfomation:  Patient was discharged on aspirin, statin, and beta blocker.       ELPIDIO AUGUSTIN, APRN  03/15/20  12:09

## 2020-03-15 NOTE — PLAN OF CARE
Problem: Patient Care Overview  Goal: Plan of Care Review  Flowsheets (Taken 3/15/2020 1029)  Outcome Summary: Great progress with activity tolerance and independence w/ mobility during PT today.  Able to ambulate with supervision, somewhat guarded gait pattern but no LOB or safety concerns.  Ascend/descended 8 steps without significant difficulty.  Practiced bed mobility via log rolling, HOB flat no bed rail.  Reviewed sternal precautions, activity/wlaking recom and PT POC to DC PT; pt, spouse and RN agreeable.

## 2020-03-15 NOTE — PROGRESS NOTES
[facility]  Cardiology Progress Note    Patient Identification:  Name: Prosper Darling  Age: 54 y.o.  Sex: male  :  1965  MRN: [id number]                 Follow Up / Chief Complaint: Coronary disease status post CABG    Interval History: He was walking around the hallways this a.m.  He states he is doing much better.  Encourage I-S.          Subjective: Still with some incisional pain.  But states he is doing very well overall.  Denies chest pain and palpitations.        Objective:    Past Medical History:  Past Medical History:   Diagnosis Date   • Enlarged prostate    • Erectile dysfunction    • Fainting spell    • GERD (gastroesophageal reflux disease)    • Hyperlipidemia    • Hypertension    • Peripheral neuropathy    • Slow to wake up after anesthesia      Past Surgical History:  Past Surgical History:   Procedure Laterality Date   • APPENDECTOMY     • CARDIAC CATHETERIZATION N/A 3/6/2020    Procedure: Coronary angiography, Left heart catheterization, Left ventricular angiography;  Surgeon: Carlos Aparicio MD;  Location: [facility] INVASIVE LOCATION;  Service: Cardiology;  Laterality: N/A;   • CORONARY ARTERY BYPASS GRAFT N/A 3/11/2020    Procedure: SHARRI, MEDIAN STERNOTOMY, CORONARY ARTERY BYPASS GRAFTING X 5   UTILIZING MARIEL INTERNAL MAMMARY ARTERIES AND ENDOSCOPICALLY HARVESTED LEFT SAPHENOUS VEIN, PRP;  Surgeon: Jr Manuel Orourke MD;  Location: [facility];  Service: Cardiothoracic;  Laterality: N/A;   • MOUTH SURGERY          Social History:   Social History     Tobacco Use   • Smoking status: Current Every Day Smoker     Packs/day: 1.00     Years: 41.00     Pack years: 41.00   • Smokeless tobacco: Never Used   • Tobacco comment: caffeine use    Substance Use Topics   • Alcohol use: Yes     Alcohol/week: 0.0 - 1.0 standard drinks     Frequency: Monthly or less     Drinks per session: 1 or 2     Comment: occas.      Family History:  History reviewed. No pertinent family  history.       Allergies:  No Known Allergies  Scheduled Meds:    aspirin 81 mg Daily   atorvastatin 40 mg Nightly   buPROPion  mg Daily   chlorhexidine 15 mL Q12H   enoxaparin 40 mg Daily   furosemide 40 mg Daily   guaiFENesin 1,200 mg Q12H   lactulose 30 g Daily   losartan 25 mg Q24H   metoprolol tartrate 50 mg Q12H   multivitamin with minerals 1 tablet Daily   mupirocin  BID   pantoprazole 40 mg Q AM   potassium chloride 20 mEq Daily   senna-docusate sodium 2 tablet Nightly   sodium chloride 10 mL Q12H           INTAKE AND OUTPUT:    Intake/Output Summary (Last 24 hours) at 3/15/2020 0836  Last data filed at 3/15/2020 0722  Gross per 24 hour   Intake 1020 ml   Output 90 ml   Net 930 ml       Review of Systems:    GI: Denies abdominal pain, nausea, vomiting   Cardiac: Denies chest pain and palpitations   Pulmonary: Denies shortness of breath and cough     Constitutional:  Temp:  [97.6 °F (36.4 °C)-98.4 °F (36.9 °C)] 97.6 °F (36.4 °C)  Heart Rate:  [] 90  Resp:  [16-18] 16  BP: ()/(63-76) 110/65        Physical Exam:  General:  Appears in no acute distress, walking in hallway  Eyes: EOM normal no conjunctival drainage  HEENT:  No JVD. Thyroid not visibly enlarged. No mucosal pallor or cyanosis  Respiratory: Respirations regular and unlabored at rest.  Diminished bases. No crackles, rubs or wheezes auscultated  Cardiovascular: S1S2 Regular rate and rhythm. No murmur, rub or gallop. No carotid bruits. DP/PT pulses  +2   . No pretibial pitting edema  Gastrointestinal: Abdomen soft, flat, non tender. Bowel sounds present. No hepatosplenomegaly. No ascites  Musculoskeletal: LUZ x4. No abnormal movements   Neuro: AAO x3 CN II-XII grossly intact  Psych: Mood and affect normal, pleasant and cooperative         3/15/20 SR       Lab Review       Results from last 7 days   Lab Units 03/12/20  0300   MAGNESIUM mg/dL 2.8*     Results from last 7 days   Lab Units 03/15/20  0315   SODIUM mmol/L 139   POTASSIUM  "mmol/L 3.5   BUN mg/dL 29*   CREATININE mg/dL 0.93   CALCIUM mg/dL 8.5*       Results from last 7 days   Lab Units 03/15/20  0315 03/14/20  0338 03/13/20  0345   WBC 10*3/mm3 10.33 13.72* 12.60*   HEMOGLOBIN g/dL 10.4* 11.6* 10.9*   HEMATOCRIT % 31.1* 33.6* 33.5*   PLATELETS 10*3/mm3 263 265 226     Results from last 7 days   Lab Units 03/12/20  0300 03/11/20  1200 03/10/20  0838   INR  1.15* 1.22* 0.96   APTT seconds  --  37.2* 42.8*         Assessment/Plans:  1.  Multivessel CAD status post 5 vessel CABG, LIMA to the mid LAD, Rayma to OM1, SVG to acute marginal, SVG to right coronary SVG to first diagonal.  Denies chest pain.  He continues to be sinus rhythm on the monitor.  Continue statin, aspirin, BB.     2.  Hypertension: Blood pressure remains stable on current medications.  Continue.    3.  Hyperlipidemia: Continue statin.    4.  Tobacco abuse: cessation given.  5.  Acute blood loss anemia: Hgb 10.4 today, stable.  Defer to CTS    We will continue to offer supportive care throughout admission.      )3/15/2020  JAIME Harvey  Weekend coverage    EMR Dragon/Transcription:   \"Dictated utilizing Dragon dictation\".     "

## 2020-03-16 LAB
ABO + RH BLD: NORMAL
ABO + RH BLD: NORMAL
BH BB BLOOD EXPIRATION DATE: NORMAL
BH BB BLOOD EXPIRATION DATE: NORMAL
BH BB BLOOD TYPE BARCODE: 5100
BH BB BLOOD TYPE BARCODE: 5100
BH BB DISPENSE STATUS: NORMAL
BH BB DISPENSE STATUS: NORMAL
BH BB PRODUCT CODE: NORMAL
BH BB PRODUCT CODE: NORMAL
BH BB UNIT NUMBER: NORMAL
BH BB UNIT NUMBER: NORMAL
CROSSMATCH INTERPRETATION: NORMAL
CROSSMATCH INTERPRETATION: NORMAL
UNIT  ABO: NORMAL
UNIT  ABO: NORMAL
UNIT  RH: NORMAL
UNIT  RH: NORMAL

## 2020-03-16 NOTE — PAYOR COMM NOTE
"Prosper Lucas U (54 y.o. Male)                            ATTENTION;   DC SUMMARY CASE REF # [id number] FOR REVIEW,                            REPLY TO [facility] DEPT,  [phone] , OR CALL  [place] REYNAGA MACARIO         Date of Birth Social Security Number Address Home Phone MRN    1965  [address]  [address]  [place] [address] [phone] [id number]    Hinduism Marital Status          Mormon        Admission Date Admission Type Admitting Provider Attending Provider Department, Room/Bed    3/11/20 Elective Jr Manuel Orourke MD  [facility] CARDIOVASC UNIT, 2227/1    Discharge Date Discharge Disposition Discharge Destination        3/15/2020 Home-Health Care Svc              Attending Provider:  (none)   Allergies:  No Known Allergies    Isolation:  None   Infection:  None   Code Status:  Prior    Ht:  172.1 cm (67.76\")   Wt:  81.5 kg (179 lb 9.6 oz)    Admission Cmt:  None   Principal Problem:  Coronary artery disease of native heart with stable angina pectoris (CMS/HCC) [I25.118] More...                 Active Insurance as of 3/11/2020     Primary Coverage     Payor Plan Insurance Group Employer/Plan Group    ANTHEM BLUE CROSS ANTHEM BLUE CROSS BLUE SHIELD PPO [id number]     Payor Plan Address Payor Plan Phone Number Payor Plan Fax Number Effective Dates    [address] [phone]  4/23/2019 - None Entered    [place] [address]       Subscriber Name Subscriber Birth Date Member ID       PROSPER LUCAS 1965 [id number]                 Emergency Contacts      (Rel.) Home Phone Work Phone Mobile Phone    Suzie Lucas (Spouse) [phone] -- [phone]               Discharge Summary      Ekaterina Nava APRN at 03/15/20 0958          Date of Admission: 3/11/2020  Date of Discharge: 3/15/2020    Discharge Diagnosis:   MV CAD s/p CABG x5 BIMA/LSVG POD#4 Sharad  Hyperlipidemia  Hypertension  Tobacco abuse   Post op anemia- expected " acute blood loss  Leukocytosis- likely reactive    Presenting Problem/History of Present Illness  Coronary artery disease of native heart with stable angina pectoris, unspecified vessel or lesion type (CMS/HCC) [I25.118]  Coronary artery disease of native heart with stable angina pectoris, unspecified vessel or lesion type (CMS/HCC) [I25.118]     Hospital Course  Patient is a 54 y.o. male presented for scheduled CABG with Dr. Orourke on 3/11/2020. He was transferred to the recovery unit in stable condition and extubated overnight. He recovered very well and suffered no postop complications. He is being discharged to home with family and home health in stable condition. He is being discharged on aspirin, statin, and beta blocker. Prescription for Norco given for pain.    Procedures Performed  Procedure(s):  3/11/2020-- SHARRI, MEDIAN STERNOTOMY, CORONARY ARTERY BYPASS GRAFTING X 5   UTILIZING MARIEL INTERNAL MAMMARY ARTERIES AND ENDOSCOPICALLY HARVESTED LEFT SAPHENOUS VEIN, PRP       Consults:   Consults     Date and Time Order Name Status Description    3/11/2020 1145 Inpatient Cardiology Consult            Pertinent Test Results:    Lab Results   Component Value Date    WBC 10.33 03/15/2020    HGB 10.4 (L) 03/15/2020    HCT 31.1 (L) 03/15/2020    MCV 85.4 03/15/2020     03/15/2020      Lab Results   Component Value Date    GLUCOSE 101 (H) 03/15/2020    CALCIUM 8.5 (L) 03/15/2020     03/15/2020    K 3.5 03/15/2020    CO2 25.3 03/15/2020     03/15/2020    BUN 29 (H) 03/15/2020    CREATININE 0.93 03/15/2020    EGFRIFNONA 84 03/15/2020    BCR 31.2 (H) 03/15/2020    ANIONGAP 13.7 03/15/2020     Lab Results   Component Value Date    INR 1.15 (H) 03/12/2020    PROTIME 14.4 (H) 03/12/2020         Condition on Discharge: Stable for discharge to home with family and home health    Vital Signs  Temp:  [97.6 °F (36.4 °C)-98.4 °F (36.9 °C)] 98 °F (36.7 °C)  Heart Rate:  [] 76  Resp:  [16-18] 16  BP:  ()/(6376) 113/65      Discharge Disposition  Home-Health Care [facility]    Discharge Medications     Discharge Medications      New Medications      Instructions Start Date   docusate sodium 100 MG capsule   100 mg, Oral, 2 Times Daily PRN      furosemide 40 MG tablet  Commonly known as:  LASIX   40 mg, Oral, Daily      HYDROcodone-acetaminophen 5-325 MG per tablet  Commonly known as:  NORCO   2 tablets, Oral, Every 4 Hours PRN      metoprolol tartrate 50 MG tablet  Commonly known as:  LOPRESSOR   50 mg, Oral, Every 12 Hours Scheduled      potassium chloride 10 MEQ CR capsule  Commonly known as:  MICRO-K   20 mEq, Oral, Daily   Start Date:  March 16, 2020        Changes to Medications      Instructions Start Date   atorvastatin 40 MG tablet  Commonly known as:  LIPITOR  What changed:    · medication strength  · how much to take   40 mg, Oral, Daily      losartan 25 MG tablet  Commonly known as:  COZAAR  What changed:    · medication strength  · how much to take   25 mg, Oral, Daily         Continue These Medications      Instructions Start Date   aspirin 81 MG tablet   81 mg, Oral, Daily      buPROPion 100 MG tablet  Commonly known as:  WELLBUTRIN   100 mg, Oral, 3 Times Daily      famotidine 20 MG tablet  Commonly known as:  PEPCID   20 mg, Oral, 2 Times Daily      MULTIVITAMIN ADULT PO   1 tablet, Oral, Daily         Stop These Medications    metoprolol succinate XL 25 MG 24 hr tablet  Commonly known as:  TOPROL-XL     nitroglycerin 0.4 MG SL tablet  Commonly known as:  NITROSTAT            Discharge Diet:   Healthy Heart Diet    Activity at Discharge:   As tolerated, no lifting > 10 pounds x 6 weeks    Follow-up Appointments  No future appointments.  Additional Instructions for the Follow-ups that You Need to Schedule     Ambulatory Referral to Cardiac Rehab   As directed      Ambulatory Referral to Cardiac Rehab   As directed      Discharge Follow-up with PCP   As directed       Currently Documented PCP:     Robert Rapp DO    PCP Phone Number:    [phone]     Follow Up Details:  in one month         Discharge Follow-up with Specialty: Cardiology; 2 Weeks   As directed      Specialty:  Cardiology    Follow Up:  2 Weeks    Follow Up Details:  Follow up with cardiologist Dr. Aparicio in 2 weeks.         Discharge Follow-up with Specified Provider: Cardiac Surgery; 6 Weeks   As directed      To:  Cardiac Surgery    Follow Up:  6 Weeks    Follow Up Details:  Follow up with cardiac surgeon Dr. Orourke in 6 weeks.         Referral to Home Health   As directed      Face to Face Visit Date:  3/15/2020    Follow-up provider for Plan of Care?:  I will be treating the patient on an ongoing basis.  Please send me the Plan of Care for signature.    Follow-up provider:  JR MANULE OROURKE [[id number]]    Reason/Clinical Findings:  S/P Cardiac Surgery    Describe mobility limitations that make leaving home difficult:  S/p Cardiac Surgery    Nursing/Therapeutic Services Requested:  Skilled Nursing    Skilled nursing orders:  Other (Post Cardiac Surgery Care)    Frequency:  Other (3 times weekly)         Call MD With Problems / Concerns   Mar 16, 2020      Instructions: Call for temp >101 or surgical wound drainage    Order Comments:  Instructions: Call for temp >101 or surgical wound drainage                Test Results Pending at Discharge    STSinfomation:  Patient was discharged on aspirin, statin, and beta blocker.       JAIME MCGUIRE  03/15/20  12:09                Electronically signed by Jr Manuel Orourke MD at 03/15/20 7170

## 2020-03-16 NOTE — PROGRESS NOTES
Case Management Discharge Note      Final Note: Pt d/c home 3/15/20 with Maudemollytracie  scheduled to follow.  AUSTIN ROGERS/CCP    Provided Post Acute Provider List?: Yes  Post Acute Provider List: Home Health  Delivered To: Support Person  Support Person: Suzie, spouse  Method of Delivery: In person    Destination      No service has been selected for the patient.      Durable Medical Equipment      No service has been selected for the patient.      Dialysis/Infusion      No service has been selected for the patient.      Home Medical Care - Selection Complete      Service Provider Request Status Selected Services Address Phone Number Fax Number    [facility] [facility] [address], [address], [place] [address] [phone]      Therapy      No service has been selected for the patient.      Community Resources      No service has been selected for the patient.             Final Discharge Disposition Code: 06 - home with home health care

## 2020-03-18 ENCOUNTER — OFFICE VISIT CONVERTED (OUTPATIENT)
Dept: FAMILY MEDICINE CLINIC | Facility: CLINIC | Age: 55
End: 2020-03-18
Attending: INTERNAL MEDICINE

## 2020-03-20 ENCOUNTER — TELEPHONE (OUTPATIENT)
Dept: CARDIOLOGY | Facility: CLINIC | Age: 55
End: 2020-03-20

## 2020-03-20 NOTE — TELEPHONE ENCOUNTER
Called patient in regards to his appointment given option of video versus telephone apt. He would like to do video apt. He is going to set his mychart up over the weekend, once he has it will send him a link to connect us through my chart.

## 2020-03-23 ENCOUNTER — OFFICE VISIT (OUTPATIENT)
Dept: CARDIOLOGY | Facility: CLINIC | Age: 55
End: 2020-03-23

## 2020-03-23 VITALS — WEIGHT: 180 LBS | HEIGHT: 67 IN | BODY MASS INDEX: 28.25 KG/M2

## 2020-03-23 DIAGNOSIS — E78.5 HYPERLIPIDEMIA LDL GOAL <70: ICD-10-CM

## 2020-03-23 DIAGNOSIS — I10 ESSENTIAL HYPERTENSION: Primary | ICD-10-CM

## 2020-03-23 DIAGNOSIS — Z95.1 S/P CABG X 5: ICD-10-CM

## 2020-03-23 DIAGNOSIS — Z72.0 TOBACCO ABUSE: ICD-10-CM

## 2020-03-23 DIAGNOSIS — I20.0 UNSTABLE ANGINA (HCC): ICD-10-CM

## 2020-03-23 DIAGNOSIS — I25.118 CORONARY ARTERY DISEASE OF NATIVE ARTERY OF NATIVE HEART WITH STABLE ANGINA PECTORIS (HCC): ICD-10-CM

## 2020-03-23 PROCEDURE — 99442 PR PHYS/QHP TELEPHONE EVALUATION 11-20 MIN: CPT | Performed by: NURSE PRACTITIONER

## 2020-03-23 RX ORDER — PANTOPRAZOLE SODIUM 20 MG/1
20 TABLET, DELAYED RELEASE ORAL DAILY
COMMUNITY
End: 2021-10-25

## 2020-03-23 RX ORDER — TAMSULOSIN HYDROCHLORIDE 0.4 MG/1
2 CAPSULE ORAL DAILY
COMMUNITY
End: 2022-02-11 | Stop reason: SDUPTHER

## 2020-03-23 NOTE — PROGRESS NOTES
Date of Office Visit: 2020  Encounter Provider: JAIME Rodas  Place of Service: [facility] CARDIOLOGY  Patient Name: Prosper Darling  :1965    Chief Complaint   Patient presents with   • Unstable Angina   :     HPI: Prosper Darling is a 54-year-old male who is a patient of Dr. Aparicio.  He is new to me today.  He has a history of hypertension, hyperlipidemia and tobacco abuse.  He first was seen by us on  where he was having symptoms of unstable angina.  He was admitted for diagnostic cardiac cath and found to have multivessel coronary disease.  He was evaluated by Dr. Orourke underwent a 5 vessel CABG.  He had a LIMA to the LAD, STACIE to first obtuse marginal branch, saphenous vein graft to acute marginal branch of the right coronary, saphenous vein graft to the right coronary and saphenous vein graft to the first diagonal.  He was discharged on the  and is been doing well.  He is here for a one-week follow-up.    Patient has been doing well since his surgery. He has no shortness of breath or ankle swelling. He is sleeping well. He has good energy and and is sleeping well. His incisions are healing and and scabbing over.  He is waiting to hear from surgery regarding a follow-up appointment.    Past Medical History:   Diagnosis Date   • CAD (coronary artery disease)    • Enlarged prostate    • Erectile dysfunction    • Fainting spell    • GERD (gastroesophageal reflux disease)    • Hyperlipidemia    • Hypertension    • Peripheral neuropathy    • Slow to wake up after anesthesia    • Unstable angina (CMS/HCC)        Past Surgical History:   Procedure Laterality Date   • APPENDECTOMY     • CARDIAC CATHETERIZATION N/A 3/6/2020    Procedure: Coronary angiography, Left heart catheterization, Left ventricular angiography;  Surgeon: Carlos Aparicio MD;  Location: [facility] CATH INVASIVE LOCATION;  Service: Cardiology;  Laterality: N/A;   • CORONARY ARTERY  BYPASS GRAFT N/A 3/11/2020    Procedure: SHARRI, MEDIAN STERNOTOMY, CORONARY ARTERY BYPASS GRAFTING X 5   UTILIZING MARIEL INTERNAL MAMMARY ARTERIES AND ENDOSCOPICALLY HARVESTED LEFT SAPHENOUS VEIN, PRP;  Surgeon: Jr Manuel Orourke MD;  Location: [facility];  Service: Cardiothoracic;  Laterality: N/A;   • MOUTH SURGERY         Social History     Socioeconomic History   • Marital status:      Spouse name: Not on file   • Number of children: Not on file   • Years of education: Not on file   • Highest education level: Not on file   Tobacco Use   • Smoking status: Former Smoker     Packs/day: 1.00     Years: 41.00     Pack years: 41.00     Start date: 3/11/2020     Last attempt to quit: 3/11/2020     Years since quittin.0   • Smokeless tobacco: Never Used   • Tobacco comment: caffeine use    Substance and Sexual Activity   • Alcohol use: Yes     Alcohol/week: 0.0 - 1.0 standard drinks     Frequency: Monthly or less     Drinks per session: 1 or 2     Comment: occas.   • Drug use: Never   • Sexual activity: Defer       History reviewed. No pertinent family history.    Review of Systems   Constitution: Negative for diaphoresis and malaise/fatigue.   Cardiovascular: Negative for chest pain, claudication, dyspnea on exertion, irregular heartbeat, leg swelling, near-syncope, orthopnea, palpitations, paroxysmal nocturnal dyspnea and syncope.   Respiratory: Negative for cough, shortness of breath and sleep disturbances due to breathing.    Musculoskeletal: Negative for falls.   Neurological: Negative for dizziness and weakness.   Psychiatric/Behavioral: Negative for altered mental status and substance abuse.       No Known Allergies      Current Outpatient Medications:   •  aspirin 81 MG tablet, Take 81 mg by mouth Daily., Disp: , Rfl:   •  atorvastatin (LIPITOR) 40 MG tablet, Take 1 tablet by mouth Daily., Disp: 30 tablet, Rfl: 3  •  buPROPion (WELLBUTRIN) 100 MG tablet, Take 100 mg by mouth 3 (Three) Times a  "Day., Disp: , Rfl:   •  losartan (COZAAR) 25 MG tablet, Take 1 tablet by mouth Daily., Disp: 30 tablet, Rfl: 3  •  metoprolol tartrate (LOPRESSOR) 50 MG tablet, Take 1 tablet by mouth Every 12 (Twelve) Hours., Disp: 60 tablet, Rfl: 3  •  Multiple Vitamins-Minerals (MULTIVITAMIN ADULT PO), Take 1 tablet by mouth Daily., Disp: , Rfl:   •  pantoprazole (PROTONIX) 20 MG EC tablet, Take 20 mg by mouth Daily., Disp: , Rfl:   •  tamsulosin (FLOMAX) 0.4 MG capsule 24 hr capsule, Take 1 capsule by mouth Daily., Disp: , Rfl:       Objective:     Vitals:    03/23/20 1330   BP Location: Left arm 115/71, Hr 73   Patient Position: Sitting   Weight: 81.6 kg (180 lb)   Height: 170.2 cm (67\")     Body mass index is 28.19 kg/m².    PHYSICAL EXAM:    Physical Exam    Procedures      Assessment:       Diagnosis Plan   1. Essential hypertension     2. Hyperlipidemia LDL goal <70     3. Unstable angina (CMS/HCC)     4. Coronary artery disease of native artery of native heart with stable angina pectoris (CMS/HCC)     5. Tobacco abuse     6. S/P CABG x 5       No orders of the defined types were placed in this encounter.         Plan:       I am not can make any changes today has blood pressure is stable heart rate looks good according to his home blood pressure machine we did a telephone visit today in light of coronavirus.  I have encouraged him to start taking some 20 to 30-minute walks around his yard or sidewalk where he lives and to stay away from other people diligently washing his hands.  He can follow-up with Dr. Aparicio in a month for either video or telephone conference.  If he has any trouble he can call us in the meantime.    Patient consented to telephone appointment and 20 minutes was spent.       Your medication list           Accurate as of March 23, 2020  1:58 PM. If you have any questions, ask your nurse or doctor.               CONTINUE taking these medications      Instructions Last Dose Given Next Dose Due   aspirin 81 " MG tablet      Take 81 mg by mouth Daily.       atorvastatin 40 MG tablet  Commonly known as:  LIPITOR      Take 1 tablet by mouth Daily.       buPROPion 100 MG tablet  Commonly known as:  WELLBUTRIN      Take 100 mg by mouth 3 (Three) Times a Day.       losartan 25 MG tablet  Commonly known as:  COZAAR      Take 1 tablet by mouth Daily.       metoprolol tartrate 50 MG tablet  Commonly known as:  LOPRESSOR      Take 1 tablet by mouth Every 12 (Twelve) Hours.       MULTIVITAMIN ADULT PO      Take 1 tablet by mouth Daily.       pantoprazole 20 MG EC tablet  Commonly known as:  PROTONIX      Take 20 mg by mouth Daily.       tamsulosin 0.4 MG capsule 24 hr capsule  Commonly known as:  FLOMAX      Take 1 capsule by mouth Daily.          STOP taking these medications    docusate sodium 100 MG capsule  Stopped by:  JAIME Rodas        famotidine 20 MG tablet  Commonly known as:  PEPCID  Stopped by:  JAIME Rodas        furosemide 40 MG tablet  Commonly known as:  LASIX  Stopped by:  JAIME Rodas        potassium chloride 10 MEQ CR capsule  Commonly known as:  MICRO-K  Stopped by:  JAIME Rodas                 As always, it has been a pleasure to participate in your patient's care.      Sincerely,     Deana SANDOVAL

## 2020-04-17 ENCOUNTER — OFFICE VISIT (OUTPATIENT)
Dept: CARDIOLOGY | Facility: CLINIC | Age: 55
End: 2020-04-17

## 2020-04-17 VITALS
SYSTOLIC BLOOD PRESSURE: 160 MMHG | BODY MASS INDEX: 28.25 KG/M2 | HEART RATE: 51 BPM | WEIGHT: 180 LBS | HEIGHT: 67 IN | DIASTOLIC BLOOD PRESSURE: 88 MMHG

## 2020-04-17 DIAGNOSIS — Z95.1 S/P CABG X 5: ICD-10-CM

## 2020-04-17 DIAGNOSIS — E78.5 HYPERLIPIDEMIA LDL GOAL <70: ICD-10-CM

## 2020-04-17 DIAGNOSIS — I25.118 CORONARY ARTERY DISEASE OF NATIVE ARTERY OF NATIVE HEART WITH STABLE ANGINA PECTORIS (HCC): ICD-10-CM

## 2020-04-17 DIAGNOSIS — I10 ESSENTIAL HYPERTENSION: Primary | ICD-10-CM

## 2020-04-17 PROCEDURE — 99214 OFFICE O/P EST MOD 30 MIN: CPT | Performed by: INTERNAL MEDICINE

## 2020-04-17 NOTE — PROGRESS NOTES
Date of Office Visit: 20  Encounter Provider: Carlos Aparicio MD  Place of Service: [facility] CARDIOLOGY  Patient Name: Prosper Darling  :1965    TELEHEALTH VISIT  VIDEO    Chief Complaint   Patient presents with   • Follow-up     from [facility]   • Coronary Artery Disease   • S/P CABG   :     HPI:   This patient has consented to a telehealth visit via phone. The visit was scheduled as a video visit to comply with patient safety concerns in accordance with CDC recommendations.  All vitals recorded within this visit are reported by the patient.  I spent  25 minutes in total including but not limited to the 15 minutes spent in direct conversation with this patient.   Prosper Darling is a 55-year-old male with a history of hypertension, hyperlipidemia and tobacco abuse.  He first was seen by us in 2020 when he was having symptoms of unstable angina.  He was admitted for diagnostic cardiac cath and found to have multivessel coronary disease.  He was evaluated by Dr. Orourke underwent a 5 vessel CABG.  He had a LIMA to the LAD, STACIE to first obtuse marginal branch, saphenous vein graft to acute marginal branch of the right coronary, saphenous vein graft to the right coronary and saphenous vein graft to the first diagonal.          Past Medical History:   Diagnosis Date   • CAD (coronary artery disease)    • Enlarged prostate    • Erectile dysfunction    • Fainting spell    • GERD (gastroesophageal reflux disease)    • Hyperlipidemia    • Hypertension    • Peripheral neuropathy    • Slow to wake up after anesthesia    • Unstable angina (CMS/[facility])        Past Surgical History:   Procedure Laterality Date   • APPENDECTOMY     • CARDIAC CATHETERIZATION N/A 3/6/2020    Procedure: Coronary angiography, Left heart catheterization, Left ventricular angiography;  Surgeon: Carlos Aparicio MD;  Location: [facility] INVASIVE LOCATION;  Service: Cardiology;   Laterality: N/A;   • CORONARY ARTERY BYPASS GRAFT N/A 3/11/2020    Procedure: SHARRI, MEDIAN STERNOTOMY, CORONARY ARTERY BYPASS GRAFTING X 5   UTILIZING MARIEL INTERNAL MAMMARY ARTERIES AND ENDOSCOPICALLY HARVESTED LEFT SAPHENOUS VEIN, PRP;  Surgeon: Jr Manuel Orourke MD;  Location: [facility];  Service: Cardiothoracic;  Laterality: N/A;   • MOUTH SURGERY         Social History     Socioeconomic History   • Marital status:      Spouse name: Not on file   • Number of children: Not on file   • Years of education: Not on file   • Highest education level: Not on file   Tobacco Use   • Smoking status: Former Smoker     Packs/day: 1.00     Years: 41.00     Pack years: 41.00     Start date: 3/11/2020     Last attempt to quit: 3/11/2020     Years since quittin.1   • Smokeless tobacco: Never Used   • Tobacco comment: caffeine use    Substance and Sexual Activity   • Alcohol use: Yes     Alcohol/week: 0.0 - 1.0 standard drinks     Frequency: Monthly or less     Drinks per session: 1 or 2     Comment: occas.   • Drug use: Never   • Sexual activity: Defer       Family History   Problem Relation Age of Onset   • No Known Problems Mother    • No Known Problems Father        Review of Systems   Constitution: Negative for diaphoresis and malaise/fatigue.   Cardiovascular: Negative for chest pain, claudication, dyspnea on exertion, irregular heartbeat, leg swelling, near-syncope, orthopnea, palpitations, paroxysmal nocturnal dyspnea and syncope.   Respiratory: Negative for cough, shortness of breath and sleep disturbances due to breathing.    Musculoskeletal: Negative for falls.   Neurological: Negative for dizziness and weakness.   Psychiatric/Behavioral: Negative for altered mental status and substance abuse.       No Known Allergies      Current Outpatient Medications:   •  aspirin 81 MG tablet, Take 81 mg by mouth Daily., Disp: , Rfl:   •  atorvastatin (LIPITOR) 40 MG tablet, Take 1 tablet by mouth Daily., Disp: 30  "tablet, Rfl: 3  •  buPROPion (WELLBUTRIN) 100 MG tablet, Take 100 mg by mouth 3 (Three) Times a Day., Disp: , Rfl:   •  losartan (COZAAR) 25 MG tablet, Take 1 tablet by mouth Daily., Disp: 30 tablet, Rfl: 3  •  metoprolol tartrate (LOPRESSOR) 50 MG tablet, Take 1 tablet by mouth Every 12 (Twelve) Hours., Disp: 60 tablet, Rfl: 3  •  pantoprazole (PROTONIX) 20 MG EC tablet, Take 20 mg by mouth Daily., Disp: , Rfl:   •  tamsulosin (FLOMAX) 0.4 MG capsule 24 hr capsule, Take 1 capsule by mouth Daily., Disp: , Rfl:       Objective:     Vitals:    03/23/20 1330   BP Location: Left arm 115/71, Hr 73   Patient Position: Sitting   Weight: 81.6 kg (180 lb)   Height: 170.2 cm (67\")     Body mass index is 28.19 kg/m².    PHYSICAL EXAM:    Physical Exam   Heart Rate:  [51] 51  BP: (160)/(88) 160/88    Flowsheet Rows      First Filed Value   Admission Height  170.2 cm (67\") Documented at 04/17/2020 0957   Admission Weight  81.6 kg (180 lb) Documented at 04/17/2020 0957      Appears well  No distress  Normocephalic  Normal respiratory effort  No additional exam  Telehealth visit secondary to COVID-19 outbreak                                                        Procedures   3/11/2020  Procedure: CABG x5.  Bilateral skeletonized DAE's.  Left DAE to mid LAD.  Right DAE to OM1.  Vein graft to acute marginal branch of the right coronary artery.  Vein graft to RCA.  Vein graft to D1.  Temporary cardiopulmonary bypass.     3/6/2020  Conclusions:   1. Left main: Normal  2. LAD: Chronic total occlusion mid segment.  Small caliber diagonal branch with 80 to 90% proximal stenosis.  3. LCX: Discrete 80% proximal stenosis.  Discrete 70% proximal OM1 stenosis.  4. RCA: 60 to 70% proximal stenosis and discrete 70% distal stenosis  5.  Normal left ventricular size and systolic function    Assessment:      No diagnosis found.  No orders of the defined types were placed in this encounter.    Plan:     A very pleasant 55-year-old male with a " medical history of unstable angina, coronary artery disease, CABG as documented above with a LIMA to LAD, STACIE to OM, SVG to marginal of the RCA along with SVG to the right and SVG to the 1st diagonal by Dr. Orourke.     Since that time he has been doing very well. He denies any chest pain or dyspnea on exertion. He states that he is able to do whatever he wants to do at this point in time without limitation. His blood pressure is a little bit up today, but he has not been checking that at home.    1. Coronary disease with prior CABG: Continue aspirin lifelong. Continue Lipitor therapy at current dose. Continue losartan and metoprolol tartrate. No issues with those medications.   2. Essential hypertension: Not quite at goal. He will check his blood pressure over the next couple of weeks and give me a call back. If this is elevated on most of his measurements, I would recommend doubling his losartan therapy.   3. Hyperlipidemia: As above. Continue atorvastatin. Repeat lab work in 6 months.    He has followup with Dr. Orourke’s team and after that I think he probably will be released back to go to work.

## 2020-04-20 ENCOUNTER — TELEMEDICINE (OUTPATIENT)
Dept: CARDIAC SURGERY | Facility: CLINIC | Age: 55
End: 2020-04-20

## 2020-04-20 VITALS
BODY MASS INDEX: 28.25 KG/M2 | HEART RATE: 58 BPM | WEIGHT: 180 LBS | SYSTOLIC BLOOD PRESSURE: 154 MMHG | DIASTOLIC BLOOD PRESSURE: 81 MMHG | HEIGHT: 67 IN

## 2020-04-20 DIAGNOSIS — Z95.1 S/P CABG X 5: Primary | ICD-10-CM

## 2020-04-20 PROCEDURE — 99024 POSTOP FOLLOW-UP VISIT: CPT | Performed by: NURSE PRACTITIONER

## 2020-04-20 NOTE — PROGRESS NOTES
"CARDIOVASCULAR SURGERY FOLLOW-UP PROGRESS NOTE  Chief Complaint: post op    HPI:   Dear Dr. Rapp, Robert Ortiz,  and colleagues:    It was nice to see Prosper Darling in follow up 5 weeks after surgery.  As you know, he is a 55 y.o. male with a history of coronary artery disease, hyperlipidemia, hypertension, and tobacco abuse who underwent CABGx5 utilizing BIMA/LSVG with . He did well postoperatively and continues to do well. He comes in today complaining of some upper chest soreness. He states that the soreness is improving, and he is taking tylenol to help, which works well. He denies any shortness of breath, lower extremity swelling, cough, or fever. He denies any drainage or redness of incision, as well as any clicking, popping, or shifting of sternum with cough or deep inspiration.  His activity level has been good.       Physical Exam:         /81 (BP Location: Right arm, Patient Position: Sitting, Cuff Size: Adult)   Pulse 58   Ht 170.2 cm (67\")   Wt 81.6 kg (180 lb)   BMI 28.19 kg/m²   Heart: Unable to assess  Lungs:  Unable to assess  Extremities:  no edema  Incision(s):  mid chest healing well, no significant drainage, no dehiscence, no significant erythema. Left leg healing well, no significant drainage, no dehiscence, no significant erythema    Assessment/Plan:     S/P CABG. Overall, he is doing well.    No significant post-op complications    No heavy lifting > 10 pounds for 1 more weeks; After that no lifting more than 50lbs for an additional 2 months  Keep incisions clean and dry  No excessive jarring motions or twisting motions until 12 weeks from the date of surgery  OK to drive if not taking narcotic pain medicine  OK to begin cardiac rehab when able to due to COVID-19 pandemic  Follow-up as scheduled with cardiology  Follow-up as scheduled with PCP  Follow-up with CT surgery prn    Instructed to call office with incisional issues, or signs and symptoms of sternal " instability    Patient is a . Okay to return to work the week of 4/27/20.    No restrictions of activity.    All vital documented were reported by the patient.    The patient consented to telehealth visit. This visit was completed via video due to the global COVID-19 pandemic. A total of 25 minutes was spent in patient care including, but not limited to 12 minutes of direct conversation with the patient via video.    Thank you for allowing me to participate in the care of your patient.    Regards,  JAIME Stanton

## 2020-04-27 ENCOUNTER — TELEPHONE (OUTPATIENT)
Dept: CARDIOLOGY | Facility: CLINIC | Age: 55
End: 2020-04-27

## 2020-04-27 NOTE — TELEPHONE ENCOUNTER
Pt called back today about his FMLA and I redirected him to Dr. Orourke's office as they filled out his paperwork.  However, they changed his date from 6/3/2020 to today with restrictions. 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 So depending what he finds out we may have to assist.    He also gave me the following BPs. It looks like they have been staying high.  Mostly staying 150-170/80's :176/84, 150/81, 172/80. HR in 50's.  You mentioned possibly increasing his Losartan when you saw him last.  Please advise.    Thanks,  Roslyn    On 4/17/20:  1. Coronary disease with prior CABG: Continue aspirin lifelong. Continue Lipitor therapy at current dose. Continue losartan and metoprolol tartrate. No issues with those medications.   2. Essential hypertension: Not quite at goal. He will check his blood pressure over the next couple of weeks and give me a call back. If this is elevated on most of his measurements, I would recommend doubling his losartan therapy.   3. Hyperlipidemia: As above. Continue atorvastatin. Repeat lab work in 6 months.     He has followup with Dr. Orourke’s team and after that I think he probably will be released back to go to work.

## 2020-04-28 RX ORDER — LOSARTAN POTASSIUM 50 MG/1
50 TABLET ORAL DAILY
Qty: 30 TABLET | Refills: 5 | Status: SHIPPED | OUTPATIENT
Start: 2020-04-28 | End: 2020-05-14 | Stop reason: SDUPTHER

## 2020-05-14 RX ORDER — LOSARTAN POTASSIUM 50 MG/1
50 TABLET ORAL DAILY
Qty: 90 TABLET | Refills: 4 | Status: SHIPPED | OUTPATIENT
Start: 2020-05-14 | End: 2021-08-26 | Stop reason: ALTCHOICE

## 2020-06-09 ENCOUNTER — TELEMEDICINE CONVERTED (OUTPATIENT)
Dept: FAMILY MEDICINE CLINIC | Facility: CLINIC | Age: 55
End: 2020-06-09
Attending: INTERNAL MEDICINE

## 2020-06-19 ENCOUNTER — OFFICE VISIT CONVERTED (OUTPATIENT)
Dept: FAMILY MEDICINE CLINIC | Facility: CLINIC | Age: 55
End: 2020-06-19
Attending: INTERNAL MEDICINE

## 2020-07-21 ENCOUNTER — HOSPITAL ENCOUNTER (OUTPATIENT)
Dept: LAB | Facility: HOSPITAL | Age: 55
Discharge: HOME | End: 2020-07-21
Attending: INTERNAL MEDICINE

## 2020-07-21 ENCOUNTER — OFFICE VISIT CONVERTED (OUTPATIENT)
Dept: FAMILY MEDICINE CLINIC | Facility: CLINIC | Age: 55
End: 2020-07-21
Attending: INTERNAL MEDICINE

## 2020-07-21 LAB
ALBUMIN SERPL-MCNC: 3.9 G/DL (ref 3.5–5)
ALBUMIN/GLOB SERPL: 1.2 {RATIO} (ref 1.4–2.6)
ALP SERPL-CCNC: 112 U/L (ref 56–119)
ALT SERPL-CCNC: 16 U/L (ref 10–40)
ANION GAP SERPL CALC-SCNC: 17 MMOL/L (ref 8–19)
AST SERPL-CCNC: 19 U/L (ref 15–50)
BILIRUB SERPL-MCNC: 0.91 MG/DL (ref 0.2–1.3)
BUN SERPL-MCNC: 13 MG/DL (ref 5–25)
BUN/CREAT SERPL: 11 {RATIO} (ref 6–20)
CALCIUM SERPL-MCNC: 9.3 MG/DL (ref 8.7–10.4)
CHLORIDE SERPL-SCNC: 104 MMOL/L (ref 99–111)
CONV CO2: 26 MMOL/L (ref 22–32)
CONV TOTAL PROTEIN: 7.2 G/DL (ref 6.3–8.2)
CREAT UR-MCNC: 1.15 MG/DL (ref 0.7–1.2)
GFR SERPLBLD BASED ON 1.73 SQ M-ARVRAT: >60 ML/MIN/{1.73_M2}
GLOBULIN UR ELPH-MCNC: 3.3 G/DL (ref 2–3.5)
GLUCOSE SERPL-MCNC: 84 MG/DL (ref 70–99)
OSMOLALITY SERPL CALC.SUM OF ELEC: 293 MOSM/KG (ref 273–304)
POTASSIUM SERPL-SCNC: 4.9 MMOL/L (ref 3.5–5.3)
SODIUM SERPL-SCNC: 142 MMOL/L (ref 135–147)

## 2020-10-21 ENCOUNTER — OFFICE VISIT CONVERTED (OUTPATIENT)
Dept: FAMILY MEDICINE CLINIC | Facility: CLINIC | Age: 55
End: 2020-10-21
Attending: INTERNAL MEDICINE

## 2020-10-21 ENCOUNTER — HOSPITAL ENCOUNTER (OUTPATIENT)
Dept: LAB | Facility: HOSPITAL | Age: 55
Discharge: HOME | End: 2020-10-21
Attending: INTERNAL MEDICINE

## 2020-10-21 LAB
ALBUMIN SERPL-MCNC: 4.2 G/DL (ref 3.5–5)
ALBUMIN/GLOB SERPL: 1.4 {RATIO} (ref 1.4–2.6)
ALP SERPL-CCNC: 80 U/L (ref 56–119)
ALT SERPL-CCNC: 25 U/L (ref 10–40)
ANION GAP SERPL CALC-SCNC: 16 MMOL/L (ref 8–19)
AST SERPL-CCNC: 24 U/L (ref 15–50)
BILIRUB SERPL-MCNC: 1.41 MG/DL (ref 0.2–1.3)
BUN SERPL-MCNC: 10 MG/DL (ref 5–25)
BUN/CREAT SERPL: 7 {RATIO} (ref 6–20)
CALCIUM SERPL-MCNC: 9.6 MG/DL (ref 8.7–10.4)
CHLORIDE SERPL-SCNC: 104 MMOL/L (ref 99–111)
CHOLEST SERPL-MCNC: 183 MG/DL (ref 107–200)
CHOLEST/HDLC SERPL: 5.7 {RATIO} (ref 3–6)
CONV CO2: 27 MMOL/L (ref 22–32)
CONV TOTAL PROTEIN: 7.3 G/DL (ref 6.3–8.2)
CREAT UR-MCNC: 1.35 MG/DL (ref 0.7–1.2)
GFR SERPLBLD BASED ON 1.73 SQ M-ARVRAT: 58 ML/MIN/{1.73_M2}
GLOBULIN UR ELPH-MCNC: 3.1 G/DL (ref 2–3.5)
GLUCOSE SERPL-MCNC: 73 MG/DL (ref 70–99)
HDLC SERPL-MCNC: 32 MG/DL (ref 40–60)
LDLC SERPL CALC-MCNC: 104 MG/DL (ref 70–100)
OSMOLALITY SERPL CALC.SUM OF ELEC: 292 MOSM/KG (ref 273–304)
POTASSIUM SERPL-SCNC: 4.6 MMOL/L (ref 3.5–5.3)
SODIUM SERPL-SCNC: 142 MMOL/L (ref 135–147)
TRIGL SERPL-MCNC: 234 MG/DL (ref 40–150)
VLDLC SERPL-MCNC: 47 MG/DL (ref 5–37)

## 2020-12-16 ENCOUNTER — TELEMEDICINE (OUTPATIENT)
Dept: CARDIOLOGY | Facility: CLINIC | Age: 55
End: 2020-12-16

## 2020-12-16 VITALS — BODY MASS INDEX: 29.82 KG/M2 | WEIGHT: 190 LBS | HEIGHT: 67 IN

## 2020-12-16 DIAGNOSIS — I25.118 CORONARY ARTERY DISEASE OF NATIVE ARTERY OF NATIVE HEART WITH STABLE ANGINA PECTORIS (HCC): Primary | ICD-10-CM

## 2020-12-16 DIAGNOSIS — Z72.0 TOBACCO ABUSE: ICD-10-CM

## 2020-12-16 DIAGNOSIS — E78.5 HYPERLIPIDEMIA LDL GOAL <70: ICD-10-CM

## 2020-12-16 DIAGNOSIS — I20.0 UNSTABLE ANGINA (HCC): ICD-10-CM

## 2020-12-16 DIAGNOSIS — Z95.1 S/P CABG X 5: ICD-10-CM

## 2020-12-16 DIAGNOSIS — I10 ESSENTIAL HYPERTENSION: ICD-10-CM

## 2020-12-16 PROCEDURE — 99213 OFFICE O/P EST LOW 20 MIN: CPT | Performed by: NURSE PRACTITIONER

## 2020-12-16 NOTE — PROGRESS NOTES
Date of Office Visit: 2020  Encounter Provider: JAIME Rodas  Place of Service: [facility] CARDIOLOGY  Patient Name: Prosper Darling  :1965    Chief Complaint   Patient presents with   • Follow-up   • Hyperlipidemia   • Coronary Artery Disease   • Hypertension   :     HPI: Prosper Darling is a 55 y.o. male who presents today for up of his angina and coronary disease.  He is a patient of Dr. Rainey and is known to me from previous in March of this year I saw him when he was having some symptoms of unstable angina.  He was admitted for diagnostic cardiac cath and found to have multivessel coronary disease.  He was evaluated by Dr. Orourke and underwent a 5 vessel CABG.  He had a LIMA to the LAD, STACIE to the first OM, saphenous vein graft to acute marginal branch of the right coronary, saphenous vein graft to the right coronary and saphenous vein graft to the first diagonal.  In April and was doing well and was getting ready to return to work.  He presents today for 6-month follow-up.    Since his heart surgery he is really been feeling pretty good.  He denies any chest discomfort or palpitations as well as shortness of breath.  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  He did follow-up with his primary care his blood pressure was a little elevated and they adjusted some of his blood pressure medications.  He does not check his blood pressure really regular.  I also discussed with him the goals of his cholesterol.  One good thing he has stopped smoking and has continued to abstain from that.    Past Medical History:   Diagnosis Date   • CAD (coronary artery disease)    • Enlarged prostate    • Erectile dysfunction    • Fainting spell    • GERD (gastroesophageal reflux disease)    • Hyperlipidemia    • Hypertension    • Peripheral neuropathy    • Slow to wake up after anesthesia    • Unstable angina  (CMS/[facility])        Past Surgical History:   Procedure Laterality Date   • APPENDECTOMY     • CARDIAC CATHETERIZATION N/A 3/6/2020    Procedure: Coronary angiography, Left heart catheterization, Left ventricular angiography;  Surgeon: Carlos Aparicio MD;  Location: [facility] CATH INVASIVE LOCATION;  Service: Cardiology;  Laterality: N/A;   • CORONARY ARTERY BYPASS GRAFT N/A 3/11/2020    Procedure: SHARRI, MEDIAN STERNOTOMY, CORONARY ARTERY BYPASS GRAFTING X 5   UTILIZING MARIEL INTERNAL MAMMARY ARTERIES AND ENDOSCOPICALLY HARVESTED LEFT SAPHENOUS VEIN, PRP;  Surgeon: Jr Manuel Orourke MD;  Location: [facility] OR;  Service: Cardiothoracic;  Laterality: N/A;   • MOUTH SURGERY         Social History     Socioeconomic History   • Marital status:      Spouse name: Not on file   • Number of children: Not on file   • Years of education: Not on file   • Highest education level: Not on file   Tobacco Use   • Smoking status: Former Smoker     Packs/day: 1.00     Years: 41.00     Pack years: 41.00     Start date: 3/11/2020     Quit date: 3/11/2020     Years since quittin.7   • Smokeless tobacco: Never Used   • Tobacco comment: caffeine use    Substance and Sexual Activity   • Alcohol use: Yes     Alcohol/week: 0.0 - 1.0 standard drinks     Frequency: Monthly or less     Drinks per session: 1 or 2     Comment: occas.   • Drug use: Never   • Sexual activity: Defer       Family History   Problem Relation Age of Onset   • Heart disease Mother    • No Known Problems Father    • Hepatitis Sister    • Stroke Maternal Grandmother    • Stroke Maternal Grandfather        Review of Systems   Constitution: Negative for diaphoresis and malaise/fatigue.   Cardiovascular: Negative for chest pain, claudication, dyspnea on exertion, irregular heartbeat, leg swelling, near-syncope, orthopnea, palpitations, paroxysmal nocturnal dyspnea and syncope.   Respiratory: Negative for cough, shortness of breath and sleep disturbances due to  "breathing.    Musculoskeletal: Negative for falls.   Neurological: Negative for dizziness and weakness.   Psychiatric/Behavioral: Negative for altered mental status and substance abuse.       No Known Allergies      Current Outpatient Medications:   •  aspirin 81 MG tablet, Take 1 tablet by mouth Daily., Disp: 90 tablet, Rfl: 4  •  atorvastatin (LIPITOR) 40 MG tablet, Take 1 tablet by mouth Daily., Disp: 30 tablet, Rfl: 3  •  losartan (COZAAR) 50 MG tablet, Take 1 tablet by mouth Daily., Disp: 90 tablet, Rfl: 4  •  metoprolol tartrate (LOPRESSOR) 50 MG tablet, Take 1 tablet by mouth Every 12 (Twelve) Hours., Disp: 60 tablet, Rfl: 3  •  pantoprazole (PROTONIX) 20 MG EC tablet, Take 20 mg by mouth Daily., Disp: , Rfl:   •  tamsulosin (FLOMAX) 0.4 MG capsule 24 hr capsule, Take 1 capsule by mouth Daily., Disp: , Rfl:       Objective:     Vitals:    12/16/20 1315   Weight: 86.2 kg (190 lb)   Height: 170.2 cm (67\")     Body mass index is 29.76 kg/m².    PHYSICAL EXAM:    Constitutional:       Appearance: Well-developed.   HENT:      Head: Normocephalic.   Neck:      Musculoskeletal: Normal range of motion.   Neurological:      Mental Status: Alert and oriented to person, place, and time.         Procedures      Assessment:       Diagnosis Plan   1. Coronary artery disease of native artery of native heart with stable angina pectoris (CMS/HCC)     2. S/P CABG x 5     3. Tobacco abuse     4. Essential hypertension     5. Hyperlipidemia LDL goal <70     6. Unstable angina (CMS/HCC)       No orders of the defined types were placed in this encounter.         Plan:       Changes today I think he is doing well have encouraged him to continue to not smoke.  I also will get his cholesterol and lab results from his PCP from recent and he will see us back in 6 months time.    This patient has consented to a telehealth visit via video. The visit was scheduled as a video visit to comply with patient safety concerns in accordance with " CDC recommendations.  All vitals recorded within this visit are reported by the patient.  I spent 20 minutes in total including but not limited to the 15 minutes spent in direct conversation with this patient.          Your medication list          Accurate as of December 16, 2020  1:33 PM. If you have any questions, ask your nurse or doctor.            CONTINUE taking these medications      Instructions Last Dose Given Next Dose Due   aspirin 81 MG tablet      Take 1 tablet by mouth Daily.       atorvastatin 40 MG tablet  Commonly known as: LIPITOR      Take 1 tablet by mouth Daily.       losartan 50 MG tablet  Commonly known as: COZAAR      Take 1 tablet by mouth Daily.       metoprolol tartrate 50 MG tablet  Commonly known as: LOPRESSOR      Take 1 tablet by mouth Every 12 (Twelve) Hours.       pantoprazole 20 MG EC tablet  Commonly known as: PROTONIX      Take 20 mg by mouth Daily.       tamsulosin 0.4 MG capsule 24 hr capsule  Commonly known as: FLOMAX      Take 1 capsule by mouth Daily.                As always, it has been a pleasure to participate in your patient's care.      Sincerely,     Deana SANDOVAL

## 2020-12-17 ENCOUNTER — TELEPHONE (OUTPATIENT)
Dept: CARDIOLOGY | Facility: CLINIC | Age: 55
End: 2020-12-17

## 2020-12-17 RX ORDER — ATORVASTATIN CALCIUM 80 MG/1
80 TABLET, FILM COATED ORAL DAILY
Qty: 30 TABLET | Refills: 6 | Status: SHIPPED | OUTPATIENT
Start: 2020-12-17 | End: 2021-08-18 | Stop reason: SDUPTHER

## 2020-12-17 NOTE — TELEPHONE ENCOUNTER
----- Message from JAIME Rodas sent at 12/17/2020  3:48 PM EST -----  Let patient know I reviewed his labs from his PCP.  His LDL of his cholesterol was 104 and the goal for somebody with heart disease is less than 70.  So I want to increase his Lipitor to 80 mg a day.  I will send a prescription to his pharmacy        Notified pt via phone call, also needs script sent to mail order pharmcay    DA

## 2021-04-02 ENCOUNTER — OFFICE VISIT CONVERTED (OUTPATIENT)
Dept: FAMILY MEDICINE CLINIC | Facility: CLINIC | Age: 56
End: 2021-04-02
Attending: NURSE PRACTITIONER

## 2021-05-12 ENCOUNTER — CONVERSION ENCOUNTER (OUTPATIENT)
Dept: SURGERY | Facility: CLINIC | Age: 56
End: 2021-05-12

## 2021-05-12 ENCOUNTER — OFFICE VISIT CONVERTED (OUTPATIENT)
Dept: SURGERY | Facility: CLINIC | Age: 56
End: 2021-05-12
Attending: NURSE PRACTITIONER

## 2021-05-13 NOTE — PROGRESS NOTES
Progress Note      Patient Name: Prosper Darling   Patient ID: [id number]   Sex: Male   YOB: 1965    Primary Care Provider: Robert Rapp DO   Referring Provider: Robert Rapp DO    Visit Date: October 21, 2020    Provider: Robert Rapp DO   Location: [facility]   Location Address: [address] [place] [address], [address]  [place], [place]  [id number]   Location Phone: [phone]          Chief Complaint  · 3 month f/u   · BP      History Of Present Illness  Prosper Darling is a 55 year old /White male who presents for evaluation and treatment of:      Pt is here for 3 month f/u on BP    Pt states that he hasn't been keep track of his BP.      Pt states that he has been feeling good lately. Patient tolerating all medications without complications.    CABG was completed by Dr. Pierre. He has not seen CV Surgery or Cardiology lately.       Past Surgical History  Procedure Name Date Notes   Appendectomy 2005 --          Medication List  Name Date Started Instructions   aspirin 81 mg oral tablet,delayed release (DR/EC)  take 1 tablet (81 mg) by oral route once daily   bupropion HCl 100 mg oral tablet sustained-release 12 hr  take 1 tablet (100 mg) by oral route 2 times per day   Lipitor 20 mg oral tablet 03/19/2020 take 1 tablet (20 mg) by oral route once daily for 90 days   losartan 100 mg oral tablet 06/09/2020 take 1 tablet (100 mg) by oral route once daily for 90 days   metoprolol tartrate 50 mg oral tablet 03/19/2020 take 1 tablet (50 mg) by oral route 2 times per day with meals for 90 days   Protonix 20 mg oral tablet,delayed release (DR/EC) 05/15/2020 take 1 tablet (20 mg) by oral route once daily for 0 day   tamsulosin 0.4 mg oral capsule 05/15/2020 take 1 capsule (0.4 mg) by oral route once daily 1/2 hour following the same meal each day for 0 day         Allergy List  Allergen Name Date Reaction Notes   NO KNOWN DRUG ALLERGIES --  --  --          [facility]  "History  Disease Name Relative/Age Notes   Stroke Grandfather (maternal)/   --    Heart Disease Mother/   --          Social History  Finding Status Start/Stop Quantity Notes   Alcohol Current some day 21/-- 1 drink per mon 1 drink per month -2/14/2020    --  --/-- --  --    Tobacco Former 15/56 1ppd --          Immunizations  NameDate Admin Mfg Trade Name Lot Number Route Inj VIS Given VIS Publication   InfluenzaRefused 02/20/2020 NE Not Entered  NE NE     Comments:          Review of Systems  · Constitutional  o Denies  o : fatigue, night sweats  · Eyes  o Denies  o : double vision, blurred vision  · HENT  o Denies  o : vertigo, recent head injury  · Cardiovascular  o Denies  o : chest pain, irregular heart beats  · Respiratory  o Denies  o : shortness of breath, productive cough  · Gastrointestinal  o Denies  o : nausea, vomiting  · Genitourinary  o Denies  o : dysuria, urinary retention  · Integument  o Denies  o : hair growth change, new skin lesions  · Neurologic  o Denies  o : altered mental status, seizures  · Musculoskeletal  o Admits  o : muscle pain  o Denies  o : joint pain, joint swelling, limitation of motion, muscle cramps  · Endocrine  o Denies  o : cold intolerance, heat intolerance  · Psychiatric  o Denies  o : anxiety, depression  · Heme-Lymph  o Denies  o : petechiae, lymph node enlargement or tenderness  · Allergic-Immunologic  o Denies  o : frequent illnesses      Vitals  Date Time BP Position Site L\R Cuff Size HR RR TEMP (F) WT  HT  BMI kg/m2 BSA m2 O2 Sat FR L/min FiO2 HC       10/21/2020 08:31 AM      52 - R  97.8 202lbs 0oz 5'  7\" 31.64 2.08 97 %  21%          Physical Examination  · Constitutional  o Appearance  o : alert, oriented, in no acute distress, well developed, well-nourished  · Eyes  o Vision  o : Conjuntivae: Normal, Sclerae white, Pupils: PERRL, Cornea: Clear, no lesions bilateral  · Ears, Nose, Mouth and Throat  o Ears  o : Ext. Ears: Normal shape, Non tender, EACs: " Normal , Tragus intact bilaterally, Hearing: intact to conversational voice bilaterally  o Nose  o : No nasal discharge, Mucosa: normal, Septum: midline, Sinuses: Nontender  o Throat  o : Oropharynx: no inflmation or lesions, no purulence or drainage  · Neck  o Inspection/Palpation  o : Supple, no masses or tenderness, no deformities, Trachea: Midline, ROM: with in normal limits, no neck stiffness, no lymphadenopathy  o Thyroid  o : no thyomegaly, no palpabale masses   · Respiratory  o Auscultation of Lungs  o : normal breath sounds throughout, no wheeze, rhonchi, or crackles  · Cardiovascular  o Heart  o : Regular rate and rhythm, Normal S1,S2 , No cardiac murmers, No S3 or S4 gallop or rubs  · Gastrointestinal  o Abdominal Examination  o : abdomen soft, nontender, non distended, no rigidity, gaurding, rebound tenderness, no ventral hernias present  o Liver and spleen  o : no hepatomegaly present, liver nontender to palpation, spleen not palpable  · Skin and Subcutaneous Tissue  o General Inspection  o : no rashes on visible skin, normal skin color, warm and dry  o Digits and Nails  o : no clubbing, cyanosis, deformities or edema present, normal appearing nails  · Neurologic  o Mental Status Examination  o : alert and oriented to time, place, and person. Gait and Station: normal gait, able to stand without difficulty. CN 2-12 grossly intact   · Psychiatric  o Judgment and Insight  o : judgment and insight intact  o Mood and Affect  o : normal mood and affect          Assessment  · BPH (benign prostatic hyperplasia)     600.00/N40.0  · Essential hypertension     401.9/I10  Keep blood pressure log. Patient to let us know if consistently running elevated like in office. Will adjust if needed.  · GERD (gastroesophageal reflux disease)     530.81/K21.9  · Hyperlipidemia     272.4/E78.5  · CAD (coronary artery disease) of artery bypass graft     414.05/I25.810      Plan  · Orders  o ACO - Pt declines to or was not able  to provide an Advance Care Plan or name a Surrogate Decision Maker (1124F) - - 10/21/2020  o ACO-39: Current medications updated and reviewed (1159F, ) - - 10/21/2020  o ACO-14: Influenza immunization was not administered for reasons documented [facility] () - - 10/21/2020  o HTN/Lipid Panel (CMP, Lipid) [facility] (33826, 46831) - 401.9/I10 - 10/21/2020  · Medications  o hydrochlorothiazide 12.5 mg oral tablet   SIG: take 1 tablet (12.5 mg) by oral route once daily for 90 days   DISP: (90) tablets with 1 refills  Discontinued on 10/21/2020     o Medications have been Reconciled  o Transition of Care or Provider Policy  · Instructions  o Patient advised to monitor blood pressure (B/P) at home and journal readings. Patient informed that a B/P reading at home of more than 130/80 is considered hypertension. For readings greater [id number]/90 or higher patient is advised to follow up in the office with readings for management. Patient advised to limit sodium intake.  o Maintain a healthy weight. Avoid tight fitting clothes. Avoid fried, fatty foods, tomato sauce, chocolate, mint, garlic, onion, alcohol. caffeine. Eat smaller meals, dont lie down after a meal, dont smoke. Elevate the head of your bed 6-9 inches.  o Recommended exercise program to assist with cholesterol, weight loss and overall health improvement.  o Advised that cheeses and other sources of dairy fats, animal fats, fast food, and the extras (candy, pastries, pies, doughnuts and cookies) all contain LDL raising nutrients. Advised to increase fruits, vegetables, whole grains, and to monitor portion sizes.   o Take all medications as prescribed/directed.  o Patient was educated/instructed on their diagnosis, treatment and medications prior to discharge from the clinic today.  o Patient instructed to seek medical attention urgently for new or worsening symptoms.  o Call the office with any concerns or questions.  o Bring all medicines with their bottles to each  office visit.  o Minutes spent with patient including greater than 50% in Education/Counseling/Care Coordination.  o Time spent with the patient was minutes, more than 50% face to face.  o Chronic conditions reviewed and taken into consideration for today's treatment plan.  o Flu vaccine declined.  o Discussed Covid-19 precautions including, but not limited to, social distancing, avoid touching your face, and hand washing.   o Electronically Identified Patient Education Materials Provided Electronically  · Disposition  o Follow up in six months            Electronically Signed by: Robert Rapp, DO -Author on October 21, 2020 09:05:03 AM

## 2021-05-13 NOTE — PROGRESS NOTES
Quick Note      Patient Name: Prosper Darling   Patient ID: [id number]   Sex: Male   YOB: 1965    Primary Care Provider: Robert Rapp DO   Referring Provider: Robert Rapp DO    Visit Date: June 9, 2020    Provider: Robert Rapp DO   Location: [facility]   Location Address: [address] [place] [address], [address]  [place], [place]  [id number]   Location Phone: [phone]          History Of Present Illness  TELEHEALTH TELEPHONE VISIT  Chief Complaint: CHIEF COMPLAINT   Prosper Darling is a 55 year old /White male who is presenting for evaluation via telehealth telephone visit. Verbal consent obtained before beginning visit.   Provider spent 8 minutes with patient during telehealth visit.   The following staff were present during this visit: Brittany Lewis/ Dr. Robert Rapp D.O.   Past Medical History/Overview of Patient Symptoms     Pt states that he needs adjust to his BP medication. Pt currently taking Losartan 50mg QD and Metoprolol 50mg BID.     Pt states that his BP has been running 170/80 and staying in the 170's.    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 Patient blood pressure has been consistently elevated. Patient denies any weight gain or dietary changes. Patient feels well overall except for the inability to work. Patient denies any chest pain, SOB, heart palpitations, sweating, wide fluctuation in blood pressures.       Physical Examination  · Constitutional  o Appearance  o : alert, oriented, in no acute distress, well developed, well-nourished  · Eyes  o Vision  o : Conjuntivae: Normal, Sclerae white, Pupils: PERRL, Cornea: Clear, no lesions bilateral  · Ears, Nose, Mouth and Throat  o Ears  o : Ext. Ears: Normal shape, Non tender, EACs: Normal , Tragus intact bilaterally, Hearing: intact to conversational voice bilaterally  o Nose  o : No nasal discharge, Mucosa: normal, Septum: midline, Sinuses: Nontender  o Throat  o : Oropharynx: no inflmation or lesions, no  purulence or drainage  · Neck  o Inspection/Palpation  o : Supple, no masses or tenderness, no deformities, Trachea: Midline, ROM: with in normal limits, no neck stiffness, no lymphadenopathy  o Thyroid  o : no thyomegaly, no palpabale masses   · Respiratory  o Auscultation of Lungs  o : normal breath sounds throughout, no wheeze, rhonchi, or crackles  · Cardiovascular  o Heart  o : Regular rate and rhythm, Normal S1,S2 , No cardiac murmers, No S3 or S4 gallop or rubs  · Gastrointestinal  o Abdominal Examination  o : abdomen soft, nontender, non distended, no rigidity, gaurding, rebound tenderness, no ventral hernias present  o Liver and spleen  o : no hepatomegaly present, liver nontender to palpation, spleen not palpable  · Skin and Subcutaneous Tissue  o General Inspection  o : no rashes on visible skin, normal skin color, warm and dry  o Digits and Nails  o : no clubbing, cyanosis, deformities or edema present, normal appearing nails  · Neurologic  o Mental Status Examination  o : alert and oriented to time, place, and person. Gait and Station: normal gait, able to stand without difficulty. CN 2-12 grossly intact   · Psychiatric  o Judgment and Insight  o : judgment and insight intact  o Mood and Affect  o : normal mood and affect          Assessment  · Essential hypertension     401.9/I10  Patient Losartan will be increased to 100 mg daily. Will continue to check. Patient feels this is a new elevation but other than 1 normal check in our office, all other office checks have been in 150s systolic. If not improved, will add HCTZ or Chlorthalidone. Patient anxious to get back to work. Patient to work has to have systolic less than 160, told him ideally less than 130 but at least less than 140 systolic.      Plan  · Orders  o ACO-39: Current medications updated and reviewed () - - 06/09/2020  o Physican Telephone evaluation, 5-10 min (27997) - 401.9/I10 - 06/09/2020  · Medications  o losartan 100 mg oral  tablet   SIG: take 1 tablet (100 mg) by oral route once daily for 90 days   DISP: (90) tablets with 1 refills  Prescribed on 06/09/2020     · Instructions  o Patient advised to monitor blood pressure (B/P) at home and journal readings. Patient informed that a B/P reading at home of more than 130/80 is considered hypertension. For readings greater jell524/90 or higher patient is advised to follow up in the office with readings for management. Patient advised to limit sodium intake.  o Plan Of Care: Coordinated, Discussed, and Educated Patient   o Chronic conditions reviewed and taken into consideration for today's treatment plan.  o Patient instructed to seek medical attention urgently for new or worsening symptoms.  o Patient was educated/instructed on their diagnosis, treatment and medications prior to discharge from the clinic today.  o Take all medications as prescribed/directed.  o Patient instructed/educated on their diet and exercise program.  o Patient counseled to reduce calorie intake.  o Call the office with any concerns or questions.  o Discussed Covid-19 precautions including, but not limited to, social distancing, avoid touching your face, and hand washing.   · Disposition  o Follow up in one month.            Electronically Signed by: Robert Rapp, DO -Author on June 9, 2020 10:35:29 AM

## 2021-05-13 NOTE — PROGRESS NOTES
Progress Note      Patient Name: Prosper Darling   Patient ID: [id number]   Sex: Male   YOB: 1965    Primary Care Provider: Robert Rapp DO   Referring Provider: Robert Rapp DO    Visit Date: June 19, 2020    Provider: Robert Rapp DO   Location: [facility]   Location Address: [address] [place] [address], [address]  [place], [place]  [id number]   Location Phone: [phone]          Chief Complaint  · CAD/HTN follow up      History Of Present Illness  Prosper Darling is a 55 year old /White male who presents for evaluation and treatment of:      Pt is here for concerns of his blood pressure, he had surgery a few months ago and was on Short Term Disability. 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 He needs to see about getting his Short term extended until he can get his blood pressure under control.    Patient blood pressure still is significantly elevated despite treatment.    Pt has never had a colonoscopy, he had a Cologuard kit that he was unable to get completed.           Past Surgical History  Procedure Name Date Notes   Appendectomy 2005 --          Medication List  Name Date Started Instructions   aspirin 81 mg oral tablet,delayed release (DR/EC)  take 1 tablet (81 mg) by oral route once daily   bupropion HCl 100 mg oral tablet sustained-release 12 hr  take 1 tablet (100 mg) by oral route 2 times per day   Lipitor 20 mg oral tablet 03/19/2020 take 1 tablet (20 mg) by oral route once daily for 90 days   losartan 100 mg oral tablet 06/09/2020 take 1 tablet (100 mg) by oral route once daily for 90 days   metoprolol tartrate 50 mg oral tablet 03/19/2020 take 1 tablet (50 mg) by oral route 2 times per day with meals for 90 days   Protonix 20 mg oral tablet,delayed release (DR/EC) 05/15/2020 take 1 tablet (20 mg) by oral route once daily for 0 day  "  tamsulosin 0.4 mg oral capsule 05/15/2020 take 1 capsule (0.4 mg) by oral route once daily 1/2 hour following the same meal each day for 0 day         Family Medical History  Disease Name Relative/Age Notes   Stroke Grandfather (maternal)/   --    Heart Disease Mother/   --          Social History  Finding Status Start/Stop Quantity Notes   Alcohol Current some day 21/-- 1 drink per mon 1 drink per month -2/14/2020    --  --/-- --  --    Tobacco Former 15/56 1ppd --          Immunizations  NameDate Admin Mfg Trade Name Lot Number Route Inj VIS Given VIS Publication   InfluenzaRefused 02/20/2020 NE Not Entered  NE NE     Comments:          Review of Systems  · Constitutional  o Denies  o : fatigue, night sweats  · Eyes  o Denies  o : double vision, blurred vision  · HENT  o Denies  o : vertigo, recent head injury  · Cardiovascular  o Denies  o : chest pain, irregular heart beats  · Respiratory  o Denies  o : shortness of breath, productive cough  · Gastrointestinal  o Denies  o : nausea, vomiting  · Genitourinary  o Denies  o : dysuria, urinary retention  · Integument  o Denies  o : hair growth change, new skin lesions  · Neurologic  o Denies  o : altered mental status, seizures  · Musculoskeletal  o Denies  o : joint swelling, limitation of motion  · Endocrine  o Denies  o : cold intolerance, heat intolerance  · Psychiatric  o Denies  o : anxiety, depression  · Heme-Lymph  o Denies  o : petechiae, lymph node enlargement or tenderness  · Allergic-Immunologic  o Denies  o : frequent illnesses      Vitals  Date Time BP Position Site L\R Cuff Size HR RR TEMP (F) WT  HT  BMI kg/m2 BSA m2 O2 Sat        06/19/2020 09:33 /80 Sitting    60 - R   197lbs 2oz 5'  7\" 30.87 2.06 99 %          Physical Examination  · Constitutional  o Appearance  o : alert, oriented, in no acute distress, well developed, well-nourished  · Eyes  o Vision  o : Conjuntivae: Normal, Sclerae white, Pupils: PERRL, Cornea: Clear, no " lesions bilateral  · Ears, Nose, Mouth and Throat  o Ears  o : Ext. Ears: Normal shape, Non tender, EACs: Normal , Tragus intact bilaterally, Hearing: intact to conversational voice bilaterally  o Nose  o : No nasal discharge, Mucosa: normal, Septum: midline, Sinuses: Nontender  o Throat  o : Oropharynx: no inflmation or lesions, no purulence or drainage  · Neck  o Inspection/Palpation  o : Supple, no masses or tenderness, no deformities, Trachea: Midline, ROM: with in normal limits, no neck stiffness, no lymphadenopathy  o Thyroid  o : no thyomegaly, no palpabale masses   · Respiratory  o Auscultation of Lungs  o : normal breath sounds throughout, no wheeze, rhonchi, or crackles  · Cardiovascular  o Heart  o : Regular rate and rhythm, Normal S1,S2 , No cardiac murmers, No S3 or S4 gallop or rubs  · Gastrointestinal  o Abdominal Examination  o : abdomen soft, nontender, non distended, no rigidity, gaurding, rebound tenderness, no ventral hernias present  o Liver and spleen  o : no hepatomegaly present, liver nontender to palpation, spleen not palpable  · Skin and Subcutaneous Tissue  o General Inspection  o : no rashes on visible skin, normal skin color, warm and dry  o Digits and Nails  o : no clubbing, cyanosis, deformities or edema present, normal appearing nails  · Neurologic  o Mental Status Examination  o : alert and oriented to time, place, and person. Gait and Station: normal gait, able to stand without difficulty. CN 2-12 grossly intact   · Psychiatric  o Judgement and Insight  o : judgment and insight intact  o Mood and Affect  o : normal mood and affect              Assessment  · Essential hypertension     401.9/I10  Patient still not near goal. Add Chlorthalidone and Amlodipine to Losartan to see if this well help get patient to regiment. Check twice daily to ensure this regiment does not run BP too low. I doubt it but if still significantly elevated, will check renal  ultrasound.  · Hyperlipidemia     272.4/E78.5  · Screening for depression     V79.0/Z13.89  · Coronary artery disease involving coronary bypass graft of native heart without angina pectoris     414.05/I25.810    Problems Reconciled  Plan  · Orders  o ACO-39: Current medications updated and reviewed () - - 06/19/2020  o ACO-18: Negative screen for clinical depression using a standardized tool () - - 06/19/2020  · Medications  o chlorthalidone 25 mg oral tablet   SIG: take 1 tablet (25 mg) by oral route once daily for 90 days   DISP: (90) tablets with 1 refills  Prescribed on 06/19/2020     o amlodipine 5 mg oral tablet   SIG: take 1 tablet (5 mg) by oral route once daily for 90 days   DISP: (90) tablets with 1 refills  Prescribed on 06/19/2020     o docusate sodium 100 mg oral capsule   SIG: take 1 capsule (100 mg) by oral route 2 times per day as needed   DISP: (0) capsule with 0 refills  Discontinued on 06/19/2020     o famotidine 20 mg oral tablet   SIG: take 1 tablet (20 mg) by oral route 2 times per day   DISP: (0) tablet with 0 refills  Discontinued on 06/19/2020     o hydrocodone-acetaminophen 5-325 mg oral tablet   SIG: take 2 tablets by oral route every 4 hours as needed for pain   DISP: (0) tablet with 0 refills  Discontinued on 06/19/2020     o Lasix 40 mg oral tablet   SIG: take 1 tablet (40 mg) by oral route once daily   DISP: (0) tablet with 0 refills  Discontinued on 06/19/2020     o potassium chloride 10 mEq oral capsule, extended release   SIG: take 2 capsules (20 meq) by oral route once daily with food   DISP: (0) capsule with 0 refills  Discontinued on 06/19/2020     o Medications have been Reconciled  o Transition of Care or Provider Policy  · Instructions  o Patient advised to monitor blood pressure (B/P) at home and journal readings. Patient informed that a B/P reading at home of more than 130/80 is considered hypertension. For readings greater jlvt796/90 or higher patient is advised  to follow up in the office with readings for management. Patient advised to limit sodium intake.  o Recommended exercise program to assist with cholesterol, weight loss and overall health improvement.  o Advised that cheeses and other sources of dairy fats, animal fats, fast food, and the extras (candy, pastries, pies, doughnuts and cookies) all contain LDL raising nutrients. Advised to increase fruits, vegetables, whole grains, and to monitor portion sizes.   o Depression Screen completed and scanned into the EMR under the designated folder within the patient's documents.  o Today's PHQ-9 result is 0  o Patient was educated/instructed on their diagnosis, treatment and medications prior to discharge from the clinic today.  o Patient instructed to seek medical attention urgently for new or worsening symptoms.  o Call the office with any concerns or questions.  o Minutes spent with patient including greater than 50% in Education/Counseling/Care Coordination.  o Time spent with the patient was minutes, more than 50% face to face.  o Chronic conditions reviewed and taken into consideration for today's treatment plan.  o Discussed Covid-19 precautions including, but not limited to, social distancing, avoid touching your face, and hand washing.   · Disposition  o Follow up in one month.            Electronically Signed by: Robert Rapp DO -Author on June 27, 2020 09:38:08 AM

## 2021-05-14 VITALS
TEMPERATURE: 97.8 F | OXYGEN SATURATION: 97 % | BODY MASS INDEX: 31.71 KG/M2 | HEIGHT: 67 IN | WEIGHT: 202 LBS | HEART RATE: 52 BPM

## 2021-05-14 VITALS
BODY MASS INDEX: 32.49 KG/M2 | HEIGHT: 67 IN | WEIGHT: 207 LBS | SYSTOLIC BLOOD PRESSURE: 164 MMHG | RESPIRATION RATE: 16 BRPM | DIASTOLIC BLOOD PRESSURE: 73 MMHG | HEART RATE: 58 BPM | OXYGEN SATURATION: 98 %

## 2021-05-14 NOTE — PROGRESS NOTES
Progress Note      Patient Name: Prosper Darling   Patient ID: [id number]   Sex: Male   YOB: 1965    Primary Care Provider: Lali SANDOVAL    Visit Date: April 2, 2021    Provider: JAIME Mckinnon   [facility]   Location Address: [address] [place] [address], [address]  [place]  [id number]   Location Phone: [phone]          Chief Complaint  · Est Care      History Of Present Illness  Prosper Darling is a 56 year old /White male who presents for evaluation and treatment of:      Pt is here to establish care.    Open Heart 5 vessel bypass 2021-states he was at home and passed out when it occurred- wife took him to [facility]-he had a heart cath at that time. States he was sent home and Open Heart was marvin -done 3/11/21. Pt states that confused on his dose for Atorvastatin. Pt states that when he was discharge from the Hospital in 3/15/2020 taking 40mg and had his cardiologist Deana Dial prescribe him 80mg and has been taking 1/2 tab. Ordered lipid to reassess.     Pt states that he know that his Prostate is enlarge but wants to know why. [facility] 0.4mg QD. does not get up during the night to urinate. States he Pt never seen specialist for this. Discussed underlying causes of bph. He c/o ED-discussed he would not be a candidate for Viagra or Cialis due to cardiac history. Referred to urology for options for ED. Ordered psa.     family hx colon cancer-uncle. Ordered colonoscopy consult.    htn-b/p running 160's/70's. He is taking losartan 100mg qd, metoprolol 50mg bid. Added Norvasc 5mg qd. Gave pt a b/p log -he is to bring it back to the follow up in 1m.     states he stopped smoking 3/11/20 -he started smoking when hw was 11 yrs old smoked 1/2-2ppd x 44 yrs.       Review of Systems  · Constitutional  o Admits  o : fatigue, wt gain  o Denies  o : fever, weight loss  · Cardiovascular  o Denies  o : lower extremity edema,  "claudication, chest pressure, palpitations  · Respiratory  o Denies  o : shortness of breath, wheezing, cough, hemoptysis, dyspnea on exertion  · Gastrointestinal  o Denies  o : nausea, vomiting, diarrhea, constipation, abdominal pain  · Genitourinary  o Admits  o : frequency, erectile dysfunction  o Denies  o : nocturia, urinary retention, difficulty voiding, decreased stream, post-void dribbling      Vitals  Date Time BP Position Site L\R Cuff Size HR RR TEMP (F) WT  HT  BMI kg/m2 BSA m2 O2 Sat FR L/min FiO2 HC       04/02/2021 02:56 /73 Sitting    58 - R 16  207lbs 0oz 5'  7\" 32.42 2.11 98 %  21%          Physical Examination  · Constitutional  o Appearance  o : alert, in no acute distress, well developed, well-nourished  · Neck  o Thyroid  o : no thyomegaly, no palpabale masses   · Respiratory  o Auscultation of Lungs  o : normal breath sounds throughout, no wheeze, rhonchi, or crackles  · Cardiovascular  o Heart  o : Regular rate and rhythm, Normal S1,S2 , No cardiac murmers, No S3 or S4 gallop or rubs  · Skin and Subcutaneous Tissue  o General Inspection  o : no rashes, normal skin color, warm and dry  o Digits and Nails  o : no clubbing, cyanosis, deformities or edema present, normal appearing nails  · Neurologic  o Mental Status Examination  o : alert and oriented to time, place, and person. Gait and Station: normal gait, able to stand without difficulty  · Psychiatric  o Judgement and Insight  o : judgment and insight intact  o Mood and Affect  o : normal mood and affect          Assessment  · Screening for colon cancer     V76.51/Z12.11  · Screening for prostate cancer     V76.44/Z12.5  · BPH (benign prostatic hyperplasia)     600.00/N40.0  · Essential hypertension     401.9/I10  · GERD (gastroesophageal reflux disease)     530.81/K21.9  · Hyperlipidemia     272.4/E78.5  · Erectile dysfunction     607.84/N52.9  · Screening for thyroid disorder     V77.0/Z13.29  · Routine lab " draw     V72.60/Z01.89  · CAD (coronary atherosclerotic disease)     414.00/I25.10  · Smoking greater than 40 pack years     305.1/F17.210      Plan  · Orders  o COLONOSCOPY REFERRAL (COLON) - V76.51/Z12.11 - 04/02/2021  o Male Physical Primary Care Panel (CMP, CBC, TSH, Lipid, PSA) [facility] (85502, 60570, 88377, 60593, 77378, ) - 401.9/I10, 272.4/E78.5, V76.44/Z12.5, V77.0/Z13.29 - 04/02/2021  o Vitamin D (25-Hydroxy) Level (03147) - V72.60/Z01.89 - 04/02/2021  o ACO-39: Current medications updated and reviewed (, 1159F) - - 04/02/2021  o UROLOGY CONSULTATION (UROLO) - 607.84/N52.9 - 04/02/2021  o Vitamin B12 level (20673) - V72.60/Z01.89 - 04/02/2021  o Low dose CT scan (LDCT) for lung cancer screening [facility] () - 305.1/F17.210 - 04/02/2021  · Medications  o Norvasc 5 mg oral tablet   SIG: take 1 tablet (5 mg) by oral route once daily   DISP: (30) Tablet with 0 refills  Prescribed on 04/02/2021     o aspirin 81 mg oral tablet,delayed release (DR/EC)   SIG: take 1 tablet (81 mg) by oral route once daily for 90 days   DISP: (90) Tablet with 1 refills  Adjusted on 04/02/2021     o losartan 100 mg oral tablet   SIG: take 1 tablet (100 mg) by oral route once daily for 90 days   DISP: (90) Tablet with 1 refills  Refilled on 04/02/2021     o metoprolol tartrate 50 mg oral tablet   SIG: take 1 tablet (50 mg) by oral route 2 times per day with meals for 90 days   DISP: (180) Tablet with 1 refills  Refilled on 04/02/2021     o Protonix 20 mg oral tablet,delayed release (DR/EC)   SIG: take 1 tablet (20 mg) by oral route once daily for 0 day   DISP: (90) Tablet with 1 refills  Refilled on 04/02/2021     o tamsulosin 0.4 mg oral capsule   SIG: take 1 capsule (0.4 mg) by oral route once daily 1/2 hour following the same meal each day for 0 day   DISP: (90) Capsule with 1 refills  Refilled on 04/02/2021     o bupropion HCl 100 mg oral tablet sustained-release 12 hr   SIG: take 1 tablet (100 mg) by oral route 2 times per  day   DISP: (0) Tablet sustained-release 12 hr with 0 refills  Discontinued on 04/02/2021     · Instructions  o Patient advised to monitor blood pressure (B/P) at home and journal readings. Patient informed that a B/P reading at home of more than 130/80 is considered hypertension. For readings greater [id number]/90 or higher patient is advised to follow up in the office with readings for management. Patient advised to limit sodium intake.  o Advised that cheeses and other sources of dairy fats, animal fats, fast food, and the extras (candy, pastries, pies, doughnuts and cookies) all contain LDL raising nutrients. Advised to increase fruits, vegetables, whole grains, and to monitor portion sizes.   o Handouts were given to patient: b/p log  o Patient was educated/instructed on their diagnosis, treatment and medications prior to discharge from the clinic today.  o Patient instructed to seek medical attention urgently for new or worsening symptoms.  o Call the office with any concerns or questions.  o 1 month follow up  · Disposition  o Call or Return if symptoms worsen or persist.            Electronically Signed by: JAIME Mckinnon -Author on April 2, 2021 03:03:48 PM

## 2021-05-15 VITALS
HEART RATE: 44 BPM | WEIGHT: 197.37 LBS | BODY MASS INDEX: 30.98 KG/M2 | HEIGHT: 67 IN | OXYGEN SATURATION: 97 % | DIASTOLIC BLOOD PRESSURE: 63 MMHG | SYSTOLIC BLOOD PRESSURE: 141 MMHG

## 2021-05-15 VITALS
HEART RATE: 60 BPM | WEIGHT: 197.12 LBS | HEIGHT: 67 IN | SYSTOLIC BLOOD PRESSURE: 175 MMHG | DIASTOLIC BLOOD PRESSURE: 80 MMHG | BODY MASS INDEX: 30.94 KG/M2 | OXYGEN SATURATION: 99 %

## 2021-05-15 VITALS
HEIGHT: 67 IN | DIASTOLIC BLOOD PRESSURE: 86 MMHG | SYSTOLIC BLOOD PRESSURE: 156 MMHG | WEIGHT: 192 LBS | HEART RATE: 68 BPM | BODY MASS INDEX: 30.13 KG/M2

## 2021-05-15 VITALS
HEIGHT: 67 IN | BODY MASS INDEX: 30.19 KG/M2 | HEART RATE: 60 BPM | OXYGEN SATURATION: 97 % | SYSTOLIC BLOOD PRESSURE: 151 MMHG | WEIGHT: 192.37 LBS | DIASTOLIC BLOOD PRESSURE: 72 MMHG

## 2021-05-15 VITALS
BODY MASS INDEX: 30.45 KG/M2 | SYSTOLIC BLOOD PRESSURE: 150 MMHG | OXYGEN SATURATION: 96 % | WEIGHT: 194 LBS | HEART RATE: 52 BPM | HEIGHT: 67 IN | DIASTOLIC BLOOD PRESSURE: 79 MMHG

## 2021-05-15 VITALS
HEIGHT: 67 IN | HEART RATE: 93 BPM | BODY MASS INDEX: 28.56 KG/M2 | DIASTOLIC BLOOD PRESSURE: 62 MMHG | SYSTOLIC BLOOD PRESSURE: 121 MMHG | OXYGEN SATURATION: 98 % | WEIGHT: 182 LBS

## 2021-05-25 ENCOUNTER — OFFICE VISIT CONVERTED (OUTPATIENT)
Dept: UROLOGY | Facility: CLINIC | Age: 56
End: 2021-05-25
Attending: NURSE PRACTITIONER

## 2021-06-05 NOTE — H&P
History and Physical      Patient Name: Prosper Darling   Patient ID: [id number]   Sex: Male   YOB: 1965    Primary Care Provider: Lali SANDOVAL   Referring Provider: Lali SANDOVAL    Visit Date: May 12, 2021    Provider: JAIME Cueto   [facility]   Location Address: [address] [place] [address]  [id number]   Location Phone: [phone]          Chief Complaint  · Requesting colonoscopy  · Age 50 or over  · Here today for a pre-surgical colon screening visit      History Of Present Illness  The patient is a 56 year old /White male presenting to the Surgical Specialist office on a referral from Lali SANDOVAL.   Prosper Darling needs to have a screening colonoscopy.   Patient states that they have not had a colonoscopy.   Patient currently complains of: no complaints   Patient Does have family history of colon cancer. uncle      Patient presents today on referral from Neha Parker for a screening colonoscopy.  Patient denies any abdominal pain, change in bowel habit, or rectal bleeding.  Its to family history of colon cancer with a paternal uncle.  No previous colonoscopy.       Past Medical History  Disease Name Date Onset Notes   Heart Attack --  --          Past Surgical History  Procedure Name Date Notes   Appendectomy 2005 --    Open Heart Surgery --  --          Medication List  Name Date Started Instructions   aspirin 81 mg oral tablet,delayed release (DR/EC) 04/02/2021 take 1 tablet (81 mg) by oral route once daily for 90 days   Lipitor 20 mg oral tablet 01/12/2021 take 1 tablet (20 mg) by oral route once daily for 90 days   losartan 100 mg oral tablet 04/02/2021 take 1 tablet (100 mg) by oral route once daily for 90 days   metoprolol tartrate 50 mg oral tablet 04/02/2021 take 1 tablet (50 mg) by oral route 2 times per day with meals for 90 days   Norvasc 5 mg oral tablet 04/02/2021 take 1 tablet (5 mg) by oral route once daily  "  Protonix 20 mg oral tablet,delayed release (DR/EC) 04/02/2021 take 1 tablet (20 mg) by oral route once daily for 0 day   tamsulosin 0.4 mg oral capsule 04/02/2021 take 1 capsule (0.4 mg) by oral route once daily 1/2 hour following the same meal each day for 0 day         Allergy List  Allergen Name Date Reaction Notes   NO KNOWN DRUG ALLERGIES --  --  --        Allergies Reconciled  Family Medical History  Disease Name Relative/Age Notes   Stroke Grandfather (maternal)/   --    Heart Disease Mother/   --          Social History  Finding Status Start/Stop Quantity Notes   Alcohol Current some day 19/-- 1 drink per mon 1 drink per month -2/14/2020    --  --/-- --  --    Tobacco Former 15/56 1ppd --          Review of Systems  · Constitutional  o Denies  o : fever, chills  · Eyes  o Denies  o : yellowish discoloration of eyes  · HENT  o Denies  o : difficulty swallowing  · Cardiovascular  o Denies  o : chest pain, chest pain on exertion  · Respiratory  o Denies  o : shortness of breath  · Gastrointestinal  o Denies  o : nausea, vomiting, diarrhea, constipation  · Genitourinary  o Denies  o : abnormal color of urine  · Integument  o Denies  o : rash  · Neurologic  o Denies  o : tingling or numbness  · Musculoskeletal  o Denies  o : joint pain  · Endocrine  o Denies  o : weight gain, weight loss      Vitals  Date Time BP Position Site L\R Cuff Size HR RR TEMP (F) WT  HT  BMI kg/m2 BSA m2 O2 Sat FR L/min FiO2 HC       05/12/2021 03:11 PM       14  206lbs 4oz 5'  7\" 32.3 2.1             Physical Examination  · Constitutional  o Appearance  o : well developed, well-nourished, patient in no apparent distress  · Head and Face  o Head  o :   § Inspection  § : atraumatic, normocephalic  o Face  o :   § Inspection  § : no facial lesions  · Eyes  o Conjunctivae  o : conjunctivae normal  o Sclerae  o : sclerae white  · Neck  o Inspection/Palpation  o : normal appearance, no masses or tenderness, trachea " midline  · Respiratory  o Respiratory Effort  o : breathing unlabored  · Skin and Subcutaneous Tissue  o General Inspection  o : no lesions present, no areas of discoloration, skin turgor normal, texture normal  · Neurologic  o Mental Status Examination  o :   § Orientation  § : grossly oriented to person, place and time  § Attention  § : attention normal, concentration abilities normal  § Fund of Knowledge  § : fund of knowledge within normal limits, patient aware of current events  o Gait and Station  o : normal gait, able to stand without difficulty  · Psychiatric  o Judgement and Insight  o : judgment and insight intact  o Mood and Affect  o : mood normal, affect appropriate              Assessment  · Family History of Colon Cancer     V16.0/Z80.0  · Screening for colon cancer     V76.51/Z12.11    Problems Reconciled  Plan  · Orders  o Consent for Colonoscopy Screening-Possible risk/complications, benefits, and alternatives to surgical or invasive procedure have been explained to patient and/or legal guardian. -Patient has been evaluated and can tolerate anesthesia and/or sedation. Risks, benefits, and alternatives to anesthesia and sedation have been explained to patient and/or legal guardian. () - V16.0/Z80.0, V76.51/Z12.11 - 08/20/2021  · Medications  o Medications have been Reconciled  o Transition of Care or Provider Policy  · Instructions  o Surgical Facility: [facility]  o Handouts Provided Pre-Procedure Instructions including date, time, and location of procedure.   o PLAN: Proceeed with colonoscopy. Patient understands risks/benefits and is willing to proceed.   o ***Surgical Orders***  o RISK AND BENEFITS:  o Given these options, the patient has verbally expressed an understanding of the risks of the surgery and finds these risks acceptable. Will proceed with surgery as soon as possible.  o O.R. PREP: Per protocol   o IV: Per Anesthesia  o Please sign permit for: Colonoscopy with  possible biopsies by Dr. Plata.  o The above History and Physical Examination has been completed within 30 days of admission.  o ***Patient Status***  o Outpatient  o Follow up in the in the office post procedure.  o Electronically Identified Patient Education Materials Provided Electronically  · Disposition  o EMR dragon/transcription disclaimer: Much of this encounter note is an electronic transcription/translation of spoken language to printed text. Electronic translation of spoken language may permit erroneous, or at times nonsensical words or phrases to be inadvertently trasncribed; although I have reviewed the note for such errors, some may still exist.            Electronically Signed by: JAIME Cueto -Author on May 12, 2021 03:39:17 PM

## 2021-06-06 NOTE — PROGRESS NOTES
Progress Note      Patient Name: Prosper Darling   Patient ID: [id number]   Sex: Male   YOB: 1965    Primary Care Provider: Lali SANDOVAL   Referring Provider: Lali SANDOVAL    Visit Date: May 25, 2021    Provider: JAIME Mensah   Location: [facility]   Location Address: [address] [place] [address]  [id number]   Location Phone: [phone]          Chief Complaint  · pt here for urological concerns      History Of Present Illness     56 year old  male patient is on referral from his primary care provider for erectile dysfunction.    The patient states that he does have erectile dysfunction however he states that he is not interested in sex and he is more concerned about his issues with his prostate at this point in time.    Discussed with the patient options for treatment for erectile dysfunction are limited for him as he has had a 5 vessel bypass and prior to having this the sildenafil was not effective for his erectile dysfunction.    The patient states that he has been on tamsulosin 0.4 mg and is still with a weak stream at times as well as a feeling of incomplete bladder emptying and post void dribble.    The patient states that he is concerned that there is something going on with his prostate as he has continued with symptoms even after initiation of Tamsulosin.           Past Medical History  Chest pain; Heart Attack; Heart Disease; High blood pressure; High cholesterol; Prostate Disorder         Past Surgical History  Appendectomy; Cardiac; Open Heart Surgery         Medication List  aspirin 81 mg oral tablet,delayed release (DR/EC); Lipitor 20 mg oral tablet; losartan 100 mg oral tablet; metoprolol tartrate 50 mg oral tablet; Miralax 17 gram/dose oral powder; Norvasc 5 mg oral tablet; Protonix 20 mg oral tablet,delayed release (DR/EC); tamsulosin 0.4 mg oral capsule         Allergy List  NO KNOWN DRUG ALLERGIES         Family Medical  "History  Stroke; Heart Disease; Family history of colon cancer         Social History  Alcohol (Current some day); ; Tobacco (Former)         Immunizations  Name Date Admin   Influenza Refused         Review of Systems  · Constitutional  o Denies  o : chills, fever  · Gastrointestinal  o Denies  o : nausea, vomiting, flank pain  · Genitourinary  o Admits  o : trouble voiding, slow stream, trouble starting/stopping stream  o Denies  o : abnormal color of urine, blood in urine, burning with urination, frequency of urine, urgency with urine, urinary leakage      Vitals  Date Time BP Position Site L\R Cuff Size HR RR TEMP (F) WT  HT  BMI kg/m2 BSA m2 O2 Sat FR L/min FiO2 HC       05/25/2021 02:53 PM       16  206lbs 4oz 5'  7\" 32.3 2.1             Physical Examination  · Constitutional  o Appearance  o : well-nourished, well developed, alert, in no acute distress  · Head and Face  o Head  o :   § Inspection  § : atraumatic, normocephalic  o Face  o :   § Inspection  § : no facial lesions  · Eyes  o Sclerae  o : sclerae white  · Ears, Nose, Mouth and Throat  o Ears  o :   § External Ears  § : appearance within normal limits, no lesions present  o Nose  o :   § External Nose  § : appearance normal  · Neck  o Inspection/Palpation  o : normal appearance, trachea midline  · Respiratory  o Respiratory Effort  o : breathing unlabored  o Inspection of Chest  o : normal appearance, no retractions  · Musculoskeletal  o Pelvis  o : no pelvic bony or muscular tenderness  o Ribs  o : no deformities or tenderness to palpation present, costochondral junctions nontender to palpation  · Skin and Subcutaneous Tissue  o General Inspection  o : no rashes or lesions present, no lesions present, no areas of discoloration  · Neurologic  o Mental Status Examination  o :   § Orientation  § : grossly oriented to person, place and time  § Speech/Language  § : communication ability within normal limits  o Gait and Station  o : normal gait, " able to stand without difficulty  · Psychiatric  o Judgement and Insight  o : judgment and insight intact, judgement for everyday activities and social situations within normal limits, insight intact  o Mood and Affect  o : mood normal, affect appropriate              Assessment  · BPH (benign prostatic hyperplasia)     600.00/N40.0      Plan  · Medications  o tamsulosin 0.4 mg oral capsule   SIG: take 2 capsules (0.8 mg) by oral route once daily 1/2 hour following the same meal each day for 90 days   DISP: (180) Capsule with 4 refills  Adjusted on 05/25/2021     o Medications have been Reconciled  o Transition of Care or Provider Policy  · Instructions  o Discussed with the patient that given his 5 vessel bypass his only option for treatment for erectile dysfunction that would be safe at this point in time would be intracavernosal injections. The patient states that he is not very concerned about erectile dysfunction at this point in time and that he would much rather pursue a work-up for his prostate. We will increase his tamsulosin dosage to 0.8 mg daily and follow-up with him in 4 weeks in office.  o Electronically Identified Patient Education Materials Provided Electronically            Electronically Signed by: JAIME Mensah -Author on May 26, 2021 01:35:30 PM

## 2021-06-30 PROBLEM — I51.9 HEART DISEASE: Status: ACTIVE | Noted: 2021-06-30

## 2021-06-30 PROBLEM — I10 HIGH BLOOD PRESSURE: Status: ACTIVE | Noted: 2021-06-30

## 2021-07-08 ENCOUNTER — OFFICE VISIT (OUTPATIENT)
Dept: UROLOGY | Facility: CLINIC | Age: 56
End: 2021-07-08

## 2021-07-08 VITALS — RESPIRATION RATE: 12 BRPM | WEIGHT: 208.4 LBS | BODY MASS INDEX: 32.71 KG/M2 | HEIGHT: 67 IN

## 2021-07-08 DIAGNOSIS — N40.0 BENIGN PROSTATIC HYPERPLASIA, UNSPECIFIED WHETHER LOWER URINARY TRACT SYMPTOMS PRESENT: Primary | ICD-10-CM

## 2021-07-08 LAB — URINE VOLUME: 0

## 2021-07-08 PROCEDURE — 51798 US URINE CAPACITY MEASURE: CPT | Performed by: NURSE PRACTITIONER

## 2021-07-08 PROCEDURE — 99212 OFFICE O/P EST SF 10 MIN: CPT | Performed by: NURSE PRACTITIONER

## 2021-07-08 NOTE — PROGRESS NOTES
Chief Complaint: Bladder Problem (follow up on ED.  Increased Flomax 0.8.  It is helping.)    Subjective         History of Present Illness  Prosper Darling is a 56 y.o. male presents to [facility] to be seen for follow-up on tamsulosin.    The patient was started on tamsulosin 0.8 mg daily at last visit to help alleviate his lower urinary tract symptoms.    The patient states that this is working well at this point in time.    He is with nocturia x 0     He states an improvement in his stream and decreased straining to void.     Objective     Past Medical History:   Diagnosis Date   • CAD (coronary artery disease)    • Enlarged prostate    • Erectile dysfunction    • Fainting spell    • GERD (gastroesophageal reflux disease)    • Hyperlipidemia    • Hypertension    • Peripheral neuropathy    • Slow to wake up after anesthesia    • Unstable angina (CMS/HCC)        Past Surgical History:   Procedure Laterality Date   • APPENDECTOMY     • CARDIAC CATHETERIZATION N/A 3/6/2020    Procedure: Coronary angiography, Left heart catheterization, Left ventricular angiography;  Surgeon: Carlos Aparicio MD;  Location: [facility] CATH INVASIVE LOCATION;  Service: Cardiology;  Laterality: N/A;   • CORONARY ARTERY BYPASS GRAFT N/A 3/11/2020    Procedure: SHARRI, MEDIAN STERNOTOMY, CORONARY ARTERY BYPASS GRAFTING X 5   UTILIZING MARIEL INTERNAL MAMMARY ARTERIES AND ENDOSCOPICALLY HARVESTED LEFT SAPHENOUS VEIN, PRP;  Surgeon: Jr Manuel Orourke MD;  Location: [facility] OR;  Service: Cardiothoracic;  Laterality: N/A;   • MOUTH SURGERY           Current Outpatient Medications:   •  aspirin 81 MG tablet, Take 1 tablet by mouth Daily., Disp: 90 tablet, Rfl: 4  •  atorvastatin (LIPITOR) 80 MG tablet, Take 1 tablet by mouth Daily., Disp: 30 tablet, Rfl: 6  •  losartan (COZAAR) 50 MG tablet, Take 1 tablet by mouth Daily., Disp: 90 tablet, Rfl: 4  •  metoprolol tartrate (LOPRESSOR) 50 MG tablet, Take 1 tablet by  "mouth Every 12 (Twelve) Hours., Disp: 60 tablet, Rfl: 3  •  pantoprazole (PROTONIX) 20 MG EC tablet, Take 20 mg by mouth Daily., Disp: , Rfl:   •  tamsulosin (FLOMAX) 0.4 MG capsule 24 hr capsule, Take 1 capsule by mouth Daily., Disp: , Rfl:     No Known Allergies     Family History   Problem Relation Age of Onset   • Heart disease Mother    • No Known Problems Father    • Hepatitis Sister    • Stroke Maternal Grandmother    • Stroke Maternal Grandfather        Social History     Socioeconomic History   • Marital status:      Spouse name: Not on file   • Number of children: Not on file   • Years of education: Not on file   • Highest education level: Not on file   Tobacco Use   • Smoking status: Former Smoker     Packs/day: 1.00     Years: 41.00     Pack years: 41.00     Start date: 3/11/2020     Quit date: 3/11/2020     Years since quittin.3   • Smokeless tobacco: Never Used   • Tobacco comment: caffeine use    Substance and Sexual Activity   • Alcohol use: Yes     Alcohol/week: 0.0 - 1.0 standard drinks     Comment: occas.   • Drug use: Never   • Sexual activity: Defer       Vital Signs:   Resp 12   Ht 170.2 cm (67\")   Wt 94.5 kg (208 lb 6.4 oz)   BMI 32.64 kg/m²      Physical Exam     Result Review :   The following data was reviewed by: JAIME De La Cruz on 2021:  Results for orders placed or performed in visit on 21   Bladder Scan   Result Value Ref Range    Urine Volume 0              Procedures        Assessment and Plan    Diagnoses and all orders for this visit:    1. Benign prostatic hyperplasia, unspecified whether lower urinary tract symptoms present (Primary)  -     Bladder Scan    Discussed options for treatment with patient including adding a secondary medication to gain better control of his symptoms at this point in time.  The patient states that he would like to see how being on the tamsulosin double dosage a little longer would further alleviate his symptoms.  We will " follow-up with him in 6 months or sooner if needed.      I spent 10 minutes caring for Prosper on this date of service. This time includes time spent by me in the following activities:reviewing tests, obtaining and/or reviewing a separately obtained history, performing a medically appropriate examination and/or evaluation , counseling and educating the patient/family/caregiver, ordering medications, tests, or procedures, and documenting information in the medical record  Follow Up   Return in about 6 months (around 1/11/2022) for f/u BPH .  Patient was given instructions and counseling regarding his condition or for health maintenance advice. Please see specific information pulled into the AVS if appropriate.

## 2021-07-15 VITALS — RESPIRATION RATE: 16 BRPM | BODY MASS INDEX: 32.37 KG/M2 | WEIGHT: 206.25 LBS | HEIGHT: 67 IN

## 2021-07-15 VITALS — HEIGHT: 67 IN | RESPIRATION RATE: 14 BRPM | WEIGHT: 206.25 LBS | BODY MASS INDEX: 32.37 KG/M2

## 2021-07-28 NOTE — ADDENDUM NOTE
Addended by: PASCUAL NUR on: 7/28/2021 02:00 PM     Modules accepted: Level of Service, SmartSet

## 2021-08-18 ENCOUNTER — TELEPHONE (OUTPATIENT)
Dept: FAMILY MEDICINE CLINIC | Facility: CLINIC | Age: 56
End: 2021-08-18

## 2021-08-18 DIAGNOSIS — E78.5 HYPERLIPIDEMIA LDL GOAL <70: Primary | ICD-10-CM

## 2021-08-18 RX ORDER — ATORVASTATIN CALCIUM 80 MG/1
40 TABLET, FILM COATED ORAL DAILY
Qty: 90 TABLET | Refills: 1 | Status: SHIPPED | OUTPATIENT
Start: 2021-08-18 | End: 2022-03-11

## 2021-08-18 NOTE — TELEPHONE ENCOUNTER
Caller: Prosper Darling    Relationship: Self    Best call back number: [phone]    What is the best time to reach you: ANYTIME    Who are you requesting to speak with (clinical staff, provider,  specific staff member): CLINICAL STAFF     What was the call regarding: PATIENT RETURNED THE CALL REGARDING DOSAGE OF THE ATORVASTATIN HE SAID IT IS 40 MG, TAKEN ONCE A DAY AT NIGHT.  PATIENT ALSO STATED THAT HE IS ABOUT OUT OF THIS MEDICATION SO WOULD NEED IT SOON FROM [facility]     Do you require a callback: YES

## 2021-08-20 ENCOUNTER — ANESTHESIA EVENT (OUTPATIENT)
Dept: GASTROENTEROLOGY | Facility: HOSPITAL | Age: 56
End: 2021-08-20

## 2021-08-20 ENCOUNTER — HOSPITAL ENCOUNTER (OUTPATIENT)
Facility: HOSPITAL | Age: 56
Setting detail: HOSPITAL OUTPATIENT SURGERY
Discharge: HOME | End: 2021-08-20
Attending: SURGERY | Admitting: SURGERY

## 2021-08-20 ENCOUNTER — ANESTHESIA (OUTPATIENT)
Dept: GASTROENTEROLOGY | Facility: HOSPITAL | Age: 56
End: 2021-08-20

## 2021-08-20 VITALS
DIASTOLIC BLOOD PRESSURE: 80 MMHG | HEART RATE: 52 BPM | HEIGHT: 67 IN | WEIGHT: 202.38 LBS | OXYGEN SATURATION: 95 % | BODY MASS INDEX: 31.76 KG/M2 | RESPIRATION RATE: 19 BRPM | TEMPERATURE: 97.8 F | SYSTOLIC BLOOD PRESSURE: 153 MMHG

## 2021-08-20 DIAGNOSIS — Z12.11 COLON CANCER SCREENING: ICD-10-CM

## 2021-08-20 PROCEDURE — 88305 TISSUE EXAM BY PATHOLOGIST: CPT | Performed by: SURGERY

## 2021-08-20 PROCEDURE — 25010000002 PROPOFOL 10 MG/ML EMULSION: Performed by: NURSE ANESTHETIST, CERTIFIED REGISTERED

## 2021-08-20 RX ORDER — SODIUM CHLORIDE, SODIUM LACTATE, POTASSIUM CHLORIDE, CALCIUM CHLORIDE 600; 310; 30; 20 MG/100ML; MG/100ML; MG/100ML; MG/100ML
30 INJECTION, SOLUTION INTRAVENOUS CONTINUOUS
Status: DISCONTINUED | OUTPATIENT
Start: 2021-08-20 | End: 2021-08-20 | Stop reason: HOSPADM

## 2021-08-20 RX ORDER — PROPOFOL 10 MG/ML
VIAL (ML) INTRAVENOUS AS NEEDED
Status: DISCONTINUED | OUTPATIENT
Start: 2021-08-20 | End: 2021-08-20 | Stop reason: SURG

## 2021-08-20 RX ORDER — LIDOCAINE HYDROCHLORIDE 20 MG/ML
INJECTION, SOLUTION INFILTRATION; PERINEURAL AS NEEDED
Status: DISCONTINUED | OUTPATIENT
Start: 2021-08-20 | End: 2021-08-20 | Stop reason: SURG

## 2021-08-20 RX ORDER — ONDANSETRON 2 MG/ML
4 INJECTION INTRAMUSCULAR; INTRAVENOUS ONCE AS NEEDED
Status: DISCONTINUED | OUTPATIENT
Start: 2021-08-20 | End: 2021-08-20 | Stop reason: HOSPADM

## 2021-08-20 RX ORDER — ONDANSETRON 4 MG/1
4 TABLET, FILM COATED ORAL ONCE AS NEEDED
Status: DISCONTINUED | OUTPATIENT
Start: 2021-08-20 | End: 2021-08-20 | Stop reason: HOSPADM

## 2021-08-20 RX ADMIN — LIDOCAINE HYDROCHLORIDE 80 MG: 20 INJECTION, SOLUTION INFILTRATION; PERINEURAL at 13:12

## 2021-08-20 RX ADMIN — SODIUM CHLORIDE, POTASSIUM CHLORIDE, SODIUM LACTATE AND CALCIUM CHLORIDE 30 ML/HR: 600; 310; 30; 20 INJECTION, SOLUTION INTRAVENOUS at 12:20

## 2021-08-20 RX ADMIN — PROPOFOL 150 MG: 10 INJECTION, EMULSION INTRAVENOUS at 13:12

## 2021-08-20 RX ADMIN — PROPOFOL 200 MCG/KG/MIN: 10 INJECTION, EMULSION INTRAVENOUS at 13:12

## 2021-08-20 NOTE — ANESTHESIA POSTPROCEDURE EVALUATION
Patient: Prosper Darling    Procedure Summary     Date: 08/20/21 Room / Location: [facility] ENDOSCOPY 1 / [facility] ENDOSCOPY    Anesthesia Start: 1309 Anesthesia Stop: 1339    Procedure: COLONOSCOPY WITH POLYPECTOMY HOT SNARE (N/A ) Diagnosis: (COLON SCREENING, FAMILY HISTORY OF COLON CANCER)    Surgeons: Devon Plata MD Provider: Rm Lane MD    Anesthesia Type: general, MAC ASA Status: 2          Anesthesia Type: general, MAC    Vitals  Vitals Value Taken Time   /80 08/20/21 1358   Temp 36.6 °C (97.8 °F) 08/20/21 1358   Pulse 52 08/20/21 1358   Resp 19 08/20/21 1358   SpO2 95 % 08/20/21 1358           Post Anesthesia Care and Evaluation    Patient location during evaluation: bedside  Patient participation: complete - patient participated  Level of consciousness: awake  Pain score: 0  Pain management: adequate  Airway patency: patent  Anesthetic complications: No anesthetic complications  PONV Status: none  Cardiovascular status: acceptable and stable  Respiratory status: acceptable and room air  Hydration status: acceptable    Comments: An Anesthesiologist personally participated in the most demanding procedures (including induction and emergence if applicable) in the anesthesia plan, monitored the course of anesthesia administration at frequent intervals and remained physically present and available for immediate diagnosis and treatment of emergencies.

## 2021-08-20 NOTE — ANESTHESIA PREPROCEDURE EVALUATION
Anesthesia Evaluation     Patient summary reviewed and Nursing notes reviewed   NPO Solid Status: > 6 hours  NPO Liquid Status: > 6 hours           Airway   Mallampati: I  TM distance: >3 FB  Dental      Pulmonary - normal exam   Cardiovascular - normal exam  Exercise tolerance: good (4-7 METS)    (+) hypertension, past MI , hyperlipidemia,       Neuro/Psych  GI/Hepatic/Renal/Endo    (+)  GERD,      Musculoskeletal     Abdominal    Substance History      OB/GYN          Other                        Anesthesia Plan    ASA 2     general and MAC   total IV anesthesia  intravenous induction     Anesthetic plan, all risks, benefits, and alternatives have been provided, discussed and informed consent has been obtained with: patient.

## 2021-08-20 NOTE — H&P
[facility]   HISTORY AND PHYSICAL    Patient Name: Prosper Darling  : 1965  MRN: [id number]  Primary Care Physician:  Lali Merlos APRN  Date of admission: 2021    Subjective   Subjective     Chief Complaint: Colon screening    HPI:    Prosper Darling is a 56 y.o. male who presents for an initial screening colonoscopy. He denies any rectal bleeding.    Review of Systems   Respiratory: Negative for shortness of breath.    Cardiovascular: Negative for chest pain.   Gastrointestinal: Negative for abdominal pain.       Personal History     Past Medical History:   Diagnosis Date   • CAD (coronary artery disease)    • Enlarged prostate    • Erectile dysfunction    • Fainting spell    • GERD (gastroesophageal reflux disease)    • Hyperlipidemia    • Hypertension    • Peripheral neuropathy    • Slow to wake up after anesthesia    • Unstable angina (CMS/HCC)        Past Surgical History:   Procedure Laterality Date   • APPENDECTOMY     • CARDIAC CATHETERIZATION N/A 3/6/2020    Procedure: Coronary angiography, Left heart catheterization, Left ventricular angiography;  Surgeon: Carlos Aparicio MD;  Location: [facility] INVASIVE LOCATION;  Service: Cardiology;  Laterality: N/A;   • CARDIAC SURGERY     • CORONARY ARTERY BYPASS GRAFT N/A 3/11/2020    Procedure: SHARRI, MEDIAN STERNOTOMY, CORONARY ARTERY BYPASS GRAFTING X 5   UTILIZING MARIEL INTERNAL MAMMARY ARTERIES AND ENDOSCOPICALLY HARVESTED LEFT SAPHENOUS VEIN, PRP;  Surgeon: Jr Manuel Orourke MD;  Location: [facility];  Service: Cardiothoracic;  Laterality: N/A;   • MOUTH SURGERY         Family History: family history includes Heart disease in his mother; Hepatitis in his sister; No Known Problems in his father; Stroke in his maternal grandfather and maternal grandmother. Otherwise pertinent FHx was reviewed and not pertinent to current issue.    Social History:  reports that he quit smoking about 17 months ago. His smoking use included  cigarettes. He has a 61.50 pack-year smoking history. He has never used smokeless tobacco. He reports current alcohol use. He reports that he does not use drugs.    Home Medications:  aspirin, atorvastatin, losartan, metoprolol tartrate, pantoprazole, and tamsulosin    Allergies:  No Known Allergies    Objective    Objective     Vitals:   Temp:  [97.7 °F (36.5 °C)] 97.7 °F (36.5 °C)  Heart Rate:  [57] 57  Resp:  [14] 14  BP: (144)/(79) 144/79    Physical Exam  HENT:      Head: Normocephalic.   Cardiovascular:      Rate and Rhythm: Normal rate.   Pulmonary:      Effort: Pulmonary effort is normal.   Abdominal:      Palpations: Abdomen is soft.   Musculoskeletal:         General: Normal range of motion.      Cervical back: Normal range of motion.   Skin:     General: Skin is warm.   Neurological:      Mental Status: He is alert and oriented to person, place, and time.   Psychiatric:         Behavior: Behavior normal.         Result Review    Result Review:  I have personally reviewed the results from the time of this admission to 8/20/2021 13:03 EDT and agree with these findings:  []  Laboratory  []  Microbiology  []  Radiology  []  EKG/Telemetry   []  Cardiology/Vascular   []  Pathology  []  Old records  []  Other:  Most notable findings include:     Assessment/Plan   Assessment / Plan     Brief Patient Summary:  Prosper Darling is a 56 y.o. male who who presents for colon screening    Active Hospital Problems:  Active Hospital Problems    Diagnosis    • Encounter for screening for malignant neoplasm of colon        Plan:   We will proceed with a colonoscopy. Risk benefits alternatives were explained.    DVT prophylaxis:  No DVT prophylaxis order currently exists.    CODE STATUS:         Admission Status:  I believe this patient meets outpatient status.    Electronically signed by Devon Plata MD, 08/20/21, 1:03 PM EDT.

## 2021-08-23 LAB
CYTO UR: NORMAL
LAB AP CASE REPORT: NORMAL
LAB AP CLINICAL INFORMATION: NORMAL
PATH REPORT.FINAL DX SPEC: NORMAL
PATH REPORT.GROSS SPEC: NORMAL

## 2021-08-26 ENCOUNTER — HOSPITAL ENCOUNTER (OUTPATIENT)
Dept: GENERAL RADIOLOGY | Facility: HOSPITAL | Age: 56
Discharge: HOME | End: 2021-08-26

## 2021-08-26 ENCOUNTER — LAB (OUTPATIENT)
Dept: LAB | Facility: HOSPITAL | Age: 56
End: 2021-08-26

## 2021-08-26 ENCOUNTER — OFFICE VISIT (OUTPATIENT)
Dept: CARDIOLOGY | Facility: CLINIC | Age: 56
End: 2021-08-26

## 2021-08-26 VITALS
DIASTOLIC BLOOD PRESSURE: 88 MMHG | SYSTOLIC BLOOD PRESSURE: 146 MMHG | BODY MASS INDEX: 30.92 KG/M2 | HEART RATE: 62 BPM | WEIGHT: 197 LBS | HEIGHT: 67 IN

## 2021-08-26 DIAGNOSIS — Z72.0 TOBACCO ABUSE: ICD-10-CM

## 2021-08-26 DIAGNOSIS — I10 ESSENTIAL HYPERTENSION: ICD-10-CM

## 2021-08-26 DIAGNOSIS — I25.118 CORONARY ARTERY DISEASE OF NATIVE ARTERY OF NATIVE HEART WITH STABLE ANGINA PECTORIS (HCC): ICD-10-CM

## 2021-08-26 DIAGNOSIS — E78.5 HYPERLIPIDEMIA LDL GOAL <70: ICD-10-CM

## 2021-08-26 DIAGNOSIS — Z95.1 S/P CABG X 5: ICD-10-CM

## 2021-08-26 DIAGNOSIS — I20.0 UNSTABLE ANGINA (HCC): ICD-10-CM

## 2021-08-26 DIAGNOSIS — Z95.1 S/P CABG X 5: Primary | ICD-10-CM

## 2021-08-26 LAB
ALBUMIN SERPL-MCNC: 4.3 G/DL (ref 3.5–5.2)
ALBUMIN/GLOB SERPL: 1.5 G/DL
ALP SERPL-CCNC: 85 U/L (ref 39–117)
ALT SERPL W P-5'-P-CCNC: 27 U/L (ref 1–41)
ANION GAP SERPL CALCULATED.3IONS-SCNC: 8.6 MMOL/L (ref 5–15)
ARTICHOKE IGE QN: 100 MG/DL (ref 0–100)
AST SERPL-CCNC: 22 U/L (ref 1–40)
BASOPHILS # BLD AUTO: 0.05 10*3/MM3 (ref 0–0.2)
BASOPHILS NFR BLD AUTO: 0.6 % (ref 0–1.5)
BILIRUB SERPL-MCNC: 1 MG/DL (ref 0–1.2)
BUN SERPL-MCNC: 9 MG/DL (ref 6–20)
BUN/CREAT SERPL: 8 (ref 7–25)
CALCIUM SPEC-SCNC: 9.3 MG/DL (ref 8.6–10.5)
CHLORIDE SERPL-SCNC: 104 MMOL/L (ref 98–107)
CO2 SERPL-SCNC: 27.4 MMOL/L (ref 22–29)
CREAT SERPL-MCNC: 1.13 MG/DL (ref 0.76–1.27)
DEPRECATED RDW RBC AUTO: 41.1 FL (ref 37–54)
EOSINOPHIL # BLD AUTO: 0.24 10*3/MM3 (ref 0–0.4)
EOSINOPHIL NFR BLD AUTO: 2.9 % (ref 0.3–6.2)
ERYTHROCYTE [DISTWIDTH] IN BLOOD BY AUTOMATED COUNT: 13.3 % (ref 12.3–15.4)
GFR SERPL CREATININE-BSD FRML MDRD: 67 ML/MIN/1.73
GLOBULIN UR ELPH-MCNC: 2.8 GM/DL
GLUCOSE SERPL-MCNC: 88 MG/DL (ref 65–99)
HCT VFR BLD AUTO: 43.1 % (ref 37.5–51)
HGB BLD-MCNC: 14.6 G/DL (ref 13–17.7)
IMM GRANULOCYTES # BLD AUTO: 0.01 10*3/MM3 (ref 0–0.05)
IMM GRANULOCYTES NFR BLD AUTO: 0.1 % (ref 0–0.5)
LYMPHOCYTES # BLD AUTO: 3.41 10*3/MM3 (ref 0.7–3.1)
LYMPHOCYTES NFR BLD AUTO: 40.8 % (ref 19.6–45.3)
MCH RBC QN AUTO: 29.1 PG (ref 26.6–33)
MCHC RBC AUTO-ENTMCNC: 33.9 G/DL (ref 31.5–35.7)
MCV RBC AUTO: 86 FL (ref 79–97)
MONOCYTES # BLD AUTO: 0.71 10*3/MM3 (ref 0.1–0.9)
MONOCYTES NFR BLD AUTO: 8.5 % (ref 5–12)
NEUTROPHILS NFR BLD AUTO: 3.94 10*3/MM3 (ref 1.7–7)
NEUTROPHILS NFR BLD AUTO: 47.1 % (ref 42.7–76)
NRBC BLD AUTO-RTO: 0 /100 WBC (ref 0–0.2)
PLATELET # BLD AUTO: 277 10*3/MM3 (ref 140–450)
PMV BLD AUTO: 9 FL (ref 6–12)
POTASSIUM SERPL-SCNC: 5 MMOL/L (ref 3.5–5.2)
PROT SERPL-MCNC: 7.1 G/DL (ref 6–8.5)
RBC # BLD AUTO: 5.01 10*6/MM3 (ref 4.14–5.8)
SODIUM SERPL-SCNC: 140 MMOL/L (ref 136–145)
WBC # BLD AUTO: 8.36 10*3/MM3 (ref 3.4–10.8)

## 2021-08-26 PROCEDURE — 80053 COMPREHEN METABOLIC PANEL: CPT

## 2021-08-26 PROCEDURE — 99214 OFFICE O/P EST MOD 30 MIN: CPT | Performed by: INTERNAL MEDICINE

## 2021-08-26 PROCEDURE — 83721 ASSAY OF BLOOD LIPOPROTEIN: CPT

## 2021-08-26 PROCEDURE — 71046 X-RAY EXAM CHEST 2 VIEWS: CPT

## 2021-08-26 PROCEDURE — 36415 COLL VENOUS BLD VENIPUNCTURE: CPT

## 2021-08-26 PROCEDURE — 85025 COMPLETE CBC W/AUTO DIFF WBC: CPT

## 2021-08-26 PROCEDURE — 93000 ELECTROCARDIOGRAM COMPLETE: CPT | Performed by: INTERNAL MEDICINE

## 2021-08-26 RX ORDER — VARENICLINE TARTRATE 1 MG/1
1 TABLET, FILM COATED ORAL 2 TIMES DAILY
Qty: 56 TABLET | Refills: 1 | Status: SHIPPED | OUTPATIENT
Start: 2021-09-23 | End: 2021-11-18

## 2021-08-26 RX ORDER — LOSARTAN POTASSIUM 100 MG/1
1 TABLET ORAL DAILY
COMMUNITY
Start: 2021-07-01 | End: 2021-09-29

## 2021-08-26 NOTE — PROGRESS NOTES
Date of Office Visit: 21  Encounter Provider: Carlos Aparicio MD  Place of Service: [facility] CARDIOLOGY  Patient Name: Prosper Darling  :1965    Chief Complaint   Patient presents with   • Follow-up     6 month   • Coronary Artery Disease   :     HPI: Prosper Darling is a 56 y.o. male who presents today for up of his angina and coronary disease.  He this year I saw him when he was having some symptoms of unstable angina.  He was admitted for diagnostic cardiac cath and found to have multivessel coronary disease.  He was evaluated by Dr. Orourke and underwent a 5 vessel CABG.  He had a LIMA to the LAD, STACIE to the first OM, saphenous vein graft to acute marginal branch of the right coronary, saphenous vein graft to the right coronary and saphenous vein graft to the first diagonal.      Her last visit he has been doing very well.  He denies any chest pain or dyspnea on exertion.  He states that after he got home from his operation previously he coughed violently and had right-sided chest discomfort that is action been chronic since that time.  He has no sternal discomfort.  His blood pressure has been mildly elevated at home and his losartan has been increased as her last visit.  He also had an elevated LDL and his atorvastatin was increased on his last test.     Past Medical History:   Diagnosis Date   • CAD (coronary artery disease)    • Enlarged prostate    • Erectile dysfunction    • Fainting spell    • GERD (gastroesophageal reflux disease)    • Hyperlipidemia    • Hypertension    • Peripheral neuropathy    • Slow to wake up after anesthesia    • Unstable angina (CMS/HCC)        Past Surgical History:   Procedure Laterality Date   • APPENDECTOMY     • CARDIAC CATHETERIZATION N/A 3/6/2020    Procedure: Coronary angiography, Left heart catheterization, Left ventricular angiography;  Surgeon: Carlos Aparicio MD;  Location: [facility] CATH INVASIVE LOCATION;   Service: Cardiology;  Laterality: N/A;   • CARDIAC SURGERY     • COLONOSCOPY N/A 2021    Procedure: COLONOSCOPY WITH POLYPECTOMY HOT SNARE;  Surgeon: Devon Plata MD;  Location: [facility] ENDOSCOPY;  Service: General;  Laterality: N/A;  COLON POLYP   • CORONARY ARTERY BYPASS GRAFT N/A 3/11/2020    Procedure: SHARRI, MEDIAN STERNOTOMY, CORONARY ARTERY BYPASS GRAFTING X 5   UTILIZING MARIEL INTERNAL MAMMARY ARTERIES AND ENDOSCOPICALLY HARVESTED LEFT SAPHENOUS VEIN, PRP;  Surgeon: Jr Manuel Orourke MD;  Location: [facility] MAIN OR;  Service: Cardiothoracic;  Laterality: N/A;   • MOUTH SURGERY         Social History     Socioeconomic History   • Marital status:      Spouse name: Not on file   • Number of children: Not on file   • Years of education: Not on file   • Highest education level: Not on file   Tobacco Use   • Smoking status: Former Smoker     Packs/day: 1.50     Years: 41.00     Pack years: 61.50     Types: Cigarettes     Quit date: 3/11/2020     Years since quittin.4   • Smokeless tobacco: Never Used   • Tobacco comment: caffeine use    Substance and Sexual Activity   • Alcohol use: Yes     Alcohol/week: 0.0 - 1.0 standard drinks     Comment: occas.   • Drug use: Never   • Sexual activity: Defer       Family History   Problem Relation Age of Onset   • Heart disease Mother    • No Known Problems Father    • Hepatitis Sister    • Stroke Maternal Grandmother    • Stroke Maternal Grandfather    • Malig Hyperthermia Neg Hx        Review of Systems   Constitutional: Negative for diaphoresis, fever and malaise/fatigue.   HENT: Negative for nosebleeds and sore throat.    Eyes: Negative for blurred vision and double vision.   Cardiovascular: Negative for chest pain, claudication, dyspnea on exertion, irregular heartbeat, leg swelling, near-syncope, orthopnea, palpitations, paroxysmal nocturnal dyspnea and syncope.   Respiratory: Negative for cough, shortness of breath, sleep disturbances due to breathing  "and snoring.    Endocrine: Negative for cold intolerance, heat intolerance and polydipsia.   Skin: Negative for itching, poor wound healing and rash.   Musculoskeletal: Negative for falls, joint pain, joint swelling, muscle weakness and myalgias.   Gastrointestinal: Negative for abdominal pain, melena, nausea and vomiting.   Neurological: Negative for dizziness, light-headedness, loss of balance, seizures, vertigo and weakness.   Psychiatric/Behavioral: Negative for altered mental status, depression and substance abuse.       No Known Allergies      Current Outpatient Medications:   •  aspirin 81 MG tablet, Take 1 tablet by mouth Daily., Disp: 90 tablet, Rfl: 4  •  atorvastatin (LIPITOR) 80 MG tablet, Take 0.5 tablets by mouth Daily., Disp: 90 tablet, Rfl: 1  •  losartan (COZAAR) 50 MG tablet, Take 1 tablet by mouth Daily. (Patient taking differently: Take 100 mg by mouth Daily.), Disp: 90 tablet, Rfl: 4  •  metoprolol tartrate (LOPRESSOR) 50 MG tablet, Take 1 tablet by mouth Every 12 (Twelve) Hours., Disp: 60 tablet, Rfl: 3  •  pantoprazole (PROTONIX) 20 MG EC tablet, Take 20 mg by mouth Daily., Disp: , Rfl:   •  tamsulosin (FLOMAX) 0.4 MG capsule 24 hr capsule, Take 2 capsules by mouth Daily., Disp: , Rfl:       Objective:     Vitals:    08/26/21 1544   Height: 170.2 cm (67\")     Body mass index is 31.7 kg/m².    PHYSICAL EXAM:    Constitutional:       Appearance: Well-developed.   Eyes:      General: No scleral icterus.     Conjunctiva/sclera: Conjunctivae normal.   HENT:      Head: Normocephalic and atraumatic.   Neck:      Thyroid: No thyromegaly.      Vascular: Normal carotid pulses. No carotid bruit, hepatojugular reflux or JVD.      Trachea: No tracheal deviation.   Pulmonary:      Effort: No respiratory distress.      Breath sounds: Normal breath sounds. No decreased breath sounds. No wheezing. No rhonchi. No rales.   Chest:      Chest wall: Not tender to palpatation.   Cardiovascular:      Normal rate. " Regular rhythm.      No gallop.   Pulses:     Carotid: 2+ bilaterally.     Radial: 2+ bilaterally.     Femoral: 2+ bilaterally.     Dorsalis pedis: 2+ bilaterally.     Posterior tibial: 2+ bilaterally.  Abdominal:      General: Bowel sounds are normal. There is no distension.      Palpations: Abdomen is soft.      Tenderness: There is no abdominal tenderness.   Musculoskeletal:         General: No deformity.      Cervical back: Normal range of motion and neck supple. Skin:     Findings: No erythema or rash.      Comments: Sternotomy   Neurological:      Mental Status: Alert and oriented to person, place, and time.      Sensory: No sensory deficit.   Psychiatric:         Behavior: Behavior normal.           ECG 12 Lead    Date/Time: 8/26/2021 4:25 PM  Performed by: Carlos Aparicio MD  Authorized by: Carlos Aparicio MD   Comparison: compared with previous ECG from 3/13/2020  Similar to previous ECG  Rhythm: sinus rhythm  Rate: normal  QRS axis: normal    Clinical impression: normal ECG             3/6/2020  Conclusions:   1. Left main: Normal  2. LAD: Chronic total occlusion mid segment.  Small caliber diagonal branch with 80 to 90% proximal stenosis.  3. LCX: Discrete 80% proximal stenosis.  Discrete 70% proximal OM1 stenosis.  4. RCA: 60 to 70% proximal stenosis and discrete 70% distal stenosis  5.  Normal left ventricular size and systolic function    3/11/2020   CABG x5.  Bilateral skeletonized DAE's.  Left DAE to mid LAD.  Right DAE to OM1.  Vein graft to acute marginal branch of the right coronary artery.  Vein graft to RCA.  Vein graft to D1.  Temporary cardiopulmonary bypass.  Antegrade and retrograde cold blood cardioplegia with warm reperfusion.  Neurologic monitoring.        Assessment:      No diagnosis found.  No orders of the defined types were placed in this encounter.    Plan:   Very pleasant 56-year-old male with a medical history of coronary disease with prior CABG with a LIMA to LAD,  STACIE to OM, SVG to acute marginal of the RCA, SVG to the RCA and SVG to the 1st diagonal, hyperlipidemia, essential hypertension, who presents to me in follow-up.  He has been doing very well.  He denies any chest pain with activity.  No dyspnea on exertion.  His blood pressure is mildly elevated but better than it has been.    1.  Coronary artery disease with prior CABG  -Continue aspirin lifelong.  No bleeding complications.  No anemia.  -Continue metoprolol tartrate 50 mg p.o. twice daily.  No resting bradycardia.  No significant fatigue.  -Continue atorvastatin at 80 mg p.o. nightly.  No myalgias.    2.  Hyperlipidemia: Continue atorvastatin at 80 mg p.o. nightly.  No myalgias.  No elevation in transaminases.    3.  Essential hypertension: Better controlled than prior.  Blood pressure 146/88.  Continue losartan and metoprolol tartrate at current dose.  We could consider moving to carvedilol in the future, however the patient wants to hold steady at this point in time.         Your medication list          Accurate as of August 26, 2021  3:58 PM. If you have any questions, ask your nurse or doctor.            CHANGE how you take these medications      Instructions Last Dose Given Next Dose Due   losartan 50 MG tablet  Commonly known as: COZAAR  What changed: how much to take      Take 1 tablet by mouth Daily.          CONTINUE taking these medications      Instructions Last Dose Given Next Dose Due   aspirin 81 MG tablet      Take 1 tablet by mouth Daily.       atorvastatin 80 MG tablet  Commonly known as: LIPITOR      Take 0.5 tablets by mouth Daily.       metoprolol tartrate 50 MG tablet  Commonly known as: LOPRESSOR      Take 1 tablet by mouth Every 12 (Twelve) Hours.       pantoprazole 20 MG EC tablet  Commonly known as: PROTONIX      Take 20 mg by mouth Daily.       tamsulosin 0.4 MG capsule 24 hr capsule  Commonly known as: FLOMAX      Take 2 capsules by mouth Daily.

## 2021-08-27 ENCOUNTER — TELEPHONE (OUTPATIENT)
Dept: CARDIOLOGY | Facility: CLINIC | Age: 56
End: 2021-08-27

## 2021-08-27 NOTE — TELEPHONE ENCOUNTER
I left the pt a message advising of his Chest Xray and lab results, as well as Dr Aparicio's instructions.    Roslyn

## 2021-08-27 NOTE — TELEPHONE ENCOUNTER
----- Message from aCrlos Aparicio MD sent at 8/27/2021  1:16 PM EDT -----  I have reviewed the lab work and chest x-ray.  No evidence of rib fracture.  X-ray looks fine.  No changes in medicines.  Please let him know

## 2021-09-29 DIAGNOSIS — I10 ESSENTIAL HYPERTENSION: Primary | ICD-10-CM

## 2021-09-29 RX ORDER — LOSARTAN POTASSIUM 100 MG/1
TABLET ORAL
Qty: 90 TABLET | Refills: 1 | Status: SHIPPED | OUTPATIENT
Start: 2021-09-29 | End: 2022-03-28

## 2021-10-25 RX ORDER — PANTOPRAZOLE SODIUM 20 MG/1
TABLET, DELAYED RELEASE ORAL
Qty: 90 TABLET | Refills: 3 | Status: SHIPPED | OUTPATIENT
Start: 2021-10-25 | End: 2022-10-20

## 2021-10-25 RX ORDER — ASPIRIN 81 MG/1
TABLET, COATED ORAL
Qty: 90 TABLET | Refills: 3 | Status: SHIPPED | OUTPATIENT
Start: 2021-10-25 | End: 2022-10-20

## 2021-11-09 RX ORDER — METOPROLOL TARTRATE 50 MG/1
TABLET, FILM COATED ORAL
Qty: 180 TABLET | Refills: 3 | Status: SHIPPED | OUTPATIENT
Start: 2021-11-09 | End: 2022-03-11

## 2022-02-11 ENCOUNTER — LAB (OUTPATIENT)
Dept: LAB | Facility: HOSPITAL | Age: 57
End: 2022-02-11

## 2022-02-11 ENCOUNTER — OFFICE VISIT (OUTPATIENT)
Dept: UROLOGY | Facility: CLINIC | Age: 57
End: 2022-02-11

## 2022-02-11 VITALS — RESPIRATION RATE: 12 BRPM | BODY MASS INDEX: 32.52 KG/M2 | HEIGHT: 67 IN | WEIGHT: 207.2 LBS

## 2022-02-11 DIAGNOSIS — Z12.5 PROSTATE CANCER SCREENING: ICD-10-CM

## 2022-02-11 DIAGNOSIS — N40.0 BENIGN PROSTATIC HYPERPLASIA, UNSPECIFIED WHETHER LOWER URINARY TRACT SYMPTOMS PRESENT: Primary | ICD-10-CM

## 2022-02-11 DIAGNOSIS — N40.0 BENIGN PROSTATIC HYPERPLASIA, UNSPECIFIED WHETHER LOWER URINARY TRACT SYMPTOMS PRESENT: ICD-10-CM

## 2022-02-11 PROBLEM — I21.9 HEART ATTACK (HCC): Status: ACTIVE | Noted: 2022-02-11

## 2022-02-11 PROBLEM — R07.9 CHEST PAIN: Status: ACTIVE | Noted: 2022-02-11

## 2022-02-11 PROBLEM — N42.9 PROSTATE DISORDER: Status: ACTIVE | Noted: 2022-02-11

## 2022-02-11 LAB
BILIRUB BLD-MCNC: NEGATIVE MG/DL
CLARITY, POC: CLEAR
COLOR UR: YELLOW
EXPIRATION DATE: NORMAL
GLUCOSE UR STRIP-MCNC: NEGATIVE MG/DL
KETONES UR QL: NEGATIVE
LEUKOCYTE EST, POC: NEGATIVE
Lab: NORMAL
NITRITE UR-MCNC: NEGATIVE MG/ML
PH UR: 7 [PH] (ref 5–8)
PROT UR STRIP-MCNC: NEGATIVE MG/DL
PSA SERPL-MCNC: 0.87 NG/ML (ref 0–4)
RBC # UR STRIP: NEGATIVE /UL
SP GR UR: 1.01 (ref 1–1.03)
URINE VOLUME: 63
UROBILINOGEN UR QL: NORMAL

## 2022-02-11 PROCEDURE — 99213 OFFICE O/P EST LOW 20 MIN: CPT | Performed by: NURSE PRACTITIONER

## 2022-02-11 PROCEDURE — 51798 US URINE CAPACITY MEASURE: CPT | Performed by: NURSE PRACTITIONER

## 2022-02-11 PROCEDURE — 81003 URINALYSIS AUTO W/O SCOPE: CPT | Performed by: NURSE PRACTITIONER

## 2022-02-11 PROCEDURE — G0103 PSA SCREENING: HCPCS

## 2022-02-11 PROCEDURE — 36415 COLL VENOUS BLD VENIPUNCTURE: CPT

## 2022-02-11 RX ORDER — TAMSULOSIN HYDROCHLORIDE 0.4 MG/1
2 CAPSULE ORAL DAILY
Qty: 90 CAPSULE | Refills: 3 | Status: SHIPPED | OUTPATIENT
Start: 2022-02-11 | End: 2022-08-10 | Stop reason: SDUPTHER

## 2022-02-11 NOTE — PROGRESS NOTES
Chief Complaint: Benign Prostatic Hypertrophy (Pt here for follow up.  Pt taking Tamsulosin and it helps.)    Subjective         History of Present Illness  Prosper Darling is a 56 y.o. male presents to [facility] UROLOGY to be seen for follow-up on tamsulosin.    The patient has beenon tamsulosin 0.8 mg daily for 6 months..    The patient states that medication is working well at this point in time.    He is with nocturia x 1     He has no complaints today.     Objective     Past Medical History:   Diagnosis Date   • CAD (coronary artery disease)    • Enlarged prostate    • Erectile dysfunction    • Fainting spell    • GERD (gastroesophageal reflux disease)    • Hyperlipidemia    • Hypertension    • Peripheral neuropathy    • Slow to wake up after anesthesia    • Unstable angina (HCC)        Past Surgical History:   Procedure Laterality Date   • APPENDECTOMY     • CARDIAC CATHETERIZATION N/A 3/6/2020    Procedure: Coronary angiography, Left heart catheterization, Left ventricular angiography;  Surgeon: Carlos Aparicio MD;  Location: [facility] CATH INVASIVE LOCATION;  Service: Cardiology;  Laterality: N/A;   • CARDIAC SURGERY     • COLONOSCOPY N/A 8/20/2021    Procedure: COLONOSCOPY WITH POLYPECTOMY HOT SNARE;  Surgeon: Devon Plata MD;  Location: [facility] ENDOSCOPY;  Service: General;  Laterality: N/A;  COLON POLYP   • CORONARY ARTERY BYPASS GRAFT N/A 3/11/2020    Procedure: SHARRI, MEDIAN STERNOTOMY, CORONARY ARTERY BYPASS GRAFTING X 5   UTILIZING MARIEL INTERNAL MAMMARY ARTERIES AND ENDOSCOPICALLY HARVESTED LEFT SAPHENOUS VEIN, PRP;  Surgeon: Jr Manuel Orourke MD;  Location: [facility] OR;  Service: Cardiothoracic;  Laterality: N/A;   • MOUTH SURGERY           Current Outpatient Medications:   •  Aspirin Low Dose 81 MG EC tablet, TAKE 1 TABLET DAILY, Disp: 90 tablet, Rfl: 3  •  atorvastatin (LIPITOR) 80 MG tablet, Take 0.5 tablets by mouth Daily., Disp: 90 tablet, Rfl: 1  •  losartan  "(COZAAR) 100 MG tablet, TAKE 1 TABLET DAILY, Disp: 90 tablet, Rfl: 1  •  metoprolol tartrate (LOPRESSOR) 50 MG tablet, TAKE 1 TABLET TWICE A DAY WITH MEALS, Disp: 180 tablet, Rfl: 3  •  pantoprazole (PROTONIX) 20 MG EC tablet, TAKE 1 TABLET ONCE DAILY, Disp: 90 tablet, Rfl: 3  •  tamsulosin (FLOMAX) 0.4 MG capsule 24 hr capsule, Take 2 capsules by mouth Daily., Disp: 90 capsule, Rfl: 3    No Known Allergies     Family History   Problem Relation Age of Onset   • Heart disease Mother    • No Known Problems Father    • Hepatitis Sister    • Stroke Maternal Grandmother    • Stroke Maternal Grandfather    • Malig Hyperthermia Neg Hx        Social History     Socioeconomic History   • Marital status:    Tobacco Use   • Smoking status: Current Every Day Smoker     Packs/day: 1.50     Years: 41.00     Pack years: 61.50     Types: Cigarettes     Last attempt to quit: 3/11/2020     Years since quittin.9   • Smokeless tobacco: Never Used   • Tobacco comment: caffeine use    Vaping Use   • Vaping Use: Never used   Substance and Sexual Activity   • Alcohol use: Yes     Alcohol/week: 0.0 - 1.0 standard drinks     Comment: occas.   • Drug use: Never   • Sexual activity: Defer       Vital Signs:   Resp 12   Ht 170.2 cm (67\")   Wt 94 kg (207 lb 3.2 oz)   BMI 32.45 kg/m²      Physical Exam     Result Review :   The following data was reviewed by: JAIME De La Cruz on 2021:  Results for orders placed or performed in visit on 22   Bladder Scan   Result Value Ref Range    Urine Volume 63    POC Urinalysis Dipstick, Automated    Specimen: Urine   Result Value Ref Range    Color Yellow Yellow, Straw, Dark Yellow, Ana M    Clarity, UA Clear Clear    Specific Gravity  1.015 1.005 - 1.030    pH, Urine 7.0 5.0 - 8.0    Leukocytes Negative Negative    Nitrite, UA Negative Negative    Protein, POC Negative Negative mg/dL    Glucose, UA Negative Negative, 1000 mg/dL (3+) mg/dL    Ketones, UA Negative Negative    " Urobilinogen, UA Normal Normal    Bilirubin Negative Negative    Blood, UA Negative Negative    Lot Number [id number]     Expiration Date 2,023/2    Bladder Scan interpretation 02/11/2022    Estimation of residual urine via [employer] 3000 [employer]athon Bladder Scan  Residual Urine: 64 ml  Indication: Benign prostatic hyperplasia, unspecified whether lower urinary tract symptoms present    Prostate cancer screening   Position: Supine  Examination: Incremental scanning of the suprapubic area using 2.0 MHz transducer using copious amounts of acoustic gel.   Findings: An anechoic area was demonstrated which represented the bladder, with measurement of residual urine as noted. I inspected this myself. In that the residual urine was stable, refer to plan for treatment and plan necessary at this time.               Procedures        Assessment and Plan    Diagnoses and all orders for this visit:    1. Benign prostatic hyperplasia, unspecified whether lower urinary tract symptoms present (Primary)  -     POC Urinalysis Dipstick, Automated  -     Bladder Scan  -     PSA Screen; Future  -     tamsulosin (FLOMAX) 0.4 MG capsule 24 hr capsule; Take 2 capsules by mouth Daily.  Dispense: 90 capsule; Refill: 3    2. Prostate cancer screening  -     PSA Screen; Future  -     PSA Screen; Future      Patient doing well at this time. He will continue tamsulosin 0.8mg q day and f/u in 1 year or sooner if needed.       I spent 10 minutes caring for Prosper on this date of service. This time includes time spent by me in the following activities:reviewing tests, obtaining and/or reviewing a separately obtained history, performing a medically appropriate examination and/or evaluation , counseling and educating the patient/family/caregiver, ordering medications, tests, or procedures, and documenting information in the medical record  Follow Up   Return in about 1 year (around 2/11/2023) for annual for BPH .  Patient was given instructions and counseling  regarding his condition or for health maintenance advice. Please see specific information pulled into the AVS if appropriate.

## 2022-03-11 ENCOUNTER — OFFICE VISIT (OUTPATIENT)
Dept: CARDIOLOGY | Facility: CLINIC | Age: 57
End: 2022-03-11

## 2022-03-11 VITALS
DIASTOLIC BLOOD PRESSURE: 90 MMHG | BODY MASS INDEX: 32.18 KG/M2 | HEART RATE: 56 BPM | WEIGHT: 205 LBS | HEIGHT: 67 IN | SYSTOLIC BLOOD PRESSURE: 152 MMHG

## 2022-03-11 DIAGNOSIS — I25.118 CORONARY ARTERY DISEASE OF NATIVE ARTERY OF NATIVE HEART WITH STABLE ANGINA PECTORIS: Primary | ICD-10-CM

## 2022-03-11 DIAGNOSIS — Z95.1 S/P CABG X 5: ICD-10-CM

## 2022-03-11 DIAGNOSIS — E78.5 HYPERLIPIDEMIA LDL GOAL <70: ICD-10-CM

## 2022-03-11 DIAGNOSIS — I10 ESSENTIAL HYPERTENSION: ICD-10-CM

## 2022-03-11 PROCEDURE — 99214 OFFICE O/P EST MOD 30 MIN: CPT | Performed by: NURSE PRACTITIONER

## 2022-03-11 PROCEDURE — 93000 ELECTROCARDIOGRAM COMPLETE: CPT | Performed by: NURSE PRACTITIONER

## 2022-03-11 RX ORDER — PRAVASTATIN SODIUM 10 MG
10 TABLET ORAL DAILY
Qty: 90 TABLET | Refills: 0 | Status: SHIPPED | OUTPATIENT
Start: 2022-03-11 | End: 2022-06-28 | Stop reason: SDUPTHER

## 2022-03-11 RX ORDER — CARVEDILOL 3.12 MG/1
3.12 TABLET ORAL 2 TIMES DAILY
Qty: 180 TABLET | Refills: 0 | Status: SHIPPED | OUTPATIENT
Start: 2022-03-11 | End: 2022-06-28 | Stop reason: SDUPTHER

## 2022-03-11 NOTE — PROGRESS NOTES
"  Date of Office Visit: 2022  Encounter Provider: JAIME Fairbanks  Place of Service: [facility]  Patient Name: Prosper Darling  :1965    Chief Complaint   Patient presents with   • Hypertension   :     HPI: Prosper Darling is a 56 y.o. male.  He is a patient of Dr. Aparicio's whom we follow for the management of hypertension, hyperlipidemia, and coronary artery disease.  In 2020, he presented with unstable angina.  Ultimately, he underwent a CABG x 5.   He was last seen in the office by Dr. Aparicio in 2021 at which time he was doing well with no complaints of angina or heart failure.  His blood pressure was mildly elevated.  Dr. Aparicio mentioned switching him to carvedilol.  However, no changes were made.  He was advised to follow-up in 6 months.   From a symptom standpoint, he has not been a significant change.  Apparently the second night home from surgery, he coughed very violently and \"felt something pull\" under his right breast.  He tells me the surgeon was made aware and needed a chest x-ray and never found anything.  Ever since, he occasionally has the same sensation.  It is not exertional.  It is no worse but also no better.  He denies any shortness of breath, edema, dizziness, or syncope.  He stopped the atorvastatin close to a year ago due to muscle aches.  Apparently this is the second statin he has tried and failed including atorvastatin and rosuvastatin.  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  He tells me he does not have much time to do anything else.  Evidently he has been experiencing these episodes where his face becomes very flushed, he feels his heart race, followed by an overwhelming feeling of exhaustion.  Reportedly this has been going on for years, even for years before his surgery.  The episodes occur rather infrequently.  However, he has had two episodes the last few days.    Past Medical History: "   Diagnosis Date   • CAD (coronary artery disease)    • Enlarged prostate    • Erectile dysfunction    • Fainting spell    • GERD (gastroesophageal reflux disease)    • Hyperlipidemia    • Hypertension    • Peripheral neuropathy    • Slow to wake up after anesthesia    • Unstable angina (HCC)        Past Surgical History:   Procedure Laterality Date   • APPENDECTOMY     • CARDIAC CATHETERIZATION N/A 3/6/2020    Procedure: Coronary angiography, Left heart catheterization, Left ventricular angiography;  Surgeon: Carlos Aparicio MD;  Location: [facility] CATH INVASIVE LOCATION;  Service: Cardiology;  Laterality: N/A;   • CARDIAC SURGERY     • COLONOSCOPY N/A 2021    Procedure: COLONOSCOPY WITH POLYPECTOMY HOT SNARE;  Surgeon: Devon Plata MD;  Location: [facility] ENDOSCOPY;  Service: General;  Laterality: N/A;  COLON POLYP   • CORONARY ARTERY BYPASS GRAFT N/A 3/11/2020    Procedure: SHARRI, MEDIAN STERNOTOMY, CORONARY ARTERY BYPASS GRAFTING X 5   UTILIZING MARIEL INTERNAL MAMMARY ARTERIES AND ENDOSCOPICALLY HARVESTED LEFT SAPHENOUS VEIN, PRP;  Surgeon: Jr Manuel Orourke MD;  Location: [facility] OR;  Service: Cardiothoracic;  Laterality: N/A;   • MOUTH SURGERY         Social History     Socioeconomic History   • Marital status:    Tobacco Use   • Smoking status: Current Every Day Smoker     Packs/day: 1.50     Years: 41.00     Pack years: 61.50     Types: Cigarettes     Last attempt to quit: 3/11/2020     Years since quittin.0   • Smokeless tobacco: Never Used   • Tobacco comment: caffeine use    Vaping Use   • Vaping Use: Never used   Substance and Sexual Activity   • Alcohol use: Yes     Alcohol/week: 0.0 - 1.0 standard drinks     Comment: occas.   • Drug use: Never   • Sexual activity: Defer       Family History   Problem Relation Age of Onset   • Heart disease Mother    • No Known Problems Father    • Hepatitis Sister    • Stroke Maternal Grandmother    • Stroke Maternal Grandfather    • Edward  "Hyperthermia Neg Hx        Review of Systems   Constitutional: Negative.   Cardiovascular: Positive for palpitations. Negative for chest pain, dyspnea on exertion, leg swelling, orthopnea, paroxysmal nocturnal dyspnea and syncope.   Respiratory: Negative.    Hematologic/Lymphatic: Negative for bleeding problem.   Musculoskeletal: Negative for falls.   Gastrointestinal: Negative for melena.   Neurological: Negative for dizziness and light-headedness.       Allergies   Allergen Reactions   • Atorvastatin Myalgia   • Rosuvastatin Myalgia         Current Outpatient Medications:   •  Aspirin Low Dose 81 MG EC tablet, TAKE 1 TABLET DAILY, Disp: 90 tablet, Rfl: 3  •  carvedilol (COREG) 3.125 MG tablet, Take 1 tablet by mouth 2 (Two) Times a Day., Disp: 180 tablet, Rfl: 0  •  losartan (COZAAR) 100 MG tablet, TAKE 1 TABLET DAILY, Disp: 90 tablet, Rfl: 1  •  pantoprazole (PROTONIX) 20 MG EC tablet, TAKE 1 TABLET ONCE DAILY, Disp: 90 tablet, Rfl: 3  •  pravastatin (PRAVACHOL) 10 MG tablet, Take 1 tablet by mouth Daily., Disp: 90 tablet, Rfl: 0  •  tamsulosin (FLOMAX) 0.4 MG capsule 24 hr capsule, Take 2 capsules by mouth Daily., Disp: 90 capsule, Rfl: 3      Objective:     Vitals:    03/11/22 1409   BP: 152/90   Pulse: 56   Weight: 93 kg (205 lb)   Height: 170.2 cm (67\")     Body mass index is 32.11 kg/m².    PHYSICAL EXAM:    Neck:      Vascular: No JVD.   Pulmonary:      Effort: Pulmonary effort is normal.      Breath sounds: Normal breath sounds.   Cardiovascular:      Normal rate. Regular rhythm.      Murmurs: There is no murmur.      No gallop. No click. No rub.   Pulses:     Intact distal pulses.           ECG 12 Lead    Date/Time: 3/11/2022 2:24 PM  Performed by: Melissa Gonzalez APRN  Authorized by: Melissa Gonzalez APRN   Comparison: compared with previous ECG from 8/26/2021  Similar to previous ECG  Rhythm: sinus rhythm  Rate: normal  BPM: 56  Q waves: III    T inversion: aVL  Other findings: non-specific " ST-T wave changes  Comments: Indication: CAD              Assessment:       Diagnosis Plan   1. Coronary artery disease of native artery of native heart with stable angina pectoris (HCC)  ECG 12 Lead   2. S/P CABG x 5     3. Essential hypertension     4. Hyperlipidemia LDL goal <70       Orders Placed This Encounter   Procedures   • ECG 12 Lead     This order was created via procedure documentation     Order Specific Question:   Release to patient     Answer:   Immediate          Plan:       1.  Coronary artery disease.  History of CABG x 5 in 2020.  He denies any symptoms of angina.  I am not sure what to make of the symptom of pain under his right breast.  It seems to be largely stable.  Continue aspirin.      2.  Hypertension.  His blood pressure is poorly controlled.  He needs to take some control over his lifestyle.  It sounds like he is under significant stress with work.  Additionally, I am going to switch him to carvedilol 3.125 mg twice daily.  We discussed a blood pressure goal of less than 130/80.  He will call me with an update in 2 weeks.      3.  Hyperlipidemia.  He has now failed both atorvastatin and rosuvastatin.  I would like to try him on pravastatin.  He will need a repeat lipid panel and hepatic function panel in 3 months.      Overall I think he is stable.  Regarding the episodes of face flushing and palpitations, I am not really sure what to make of them.  It could be related to his blood pressure.  It could also be related to an arrhythmia.  Ultimately, we have decided to see how things go with the adjustment of his antihypertensives.  If the episodes persist once his blood pressure is controlled, I would order a ZIO.      As always, it has been a pleasure to participate in your patient's care.      Sincerely,         JAIME Mcgrath

## 2022-03-28 DIAGNOSIS — I10 ESSENTIAL HYPERTENSION: ICD-10-CM

## 2022-03-28 RX ORDER — LOSARTAN POTASSIUM 100 MG/1
TABLET ORAL
Qty: 90 TABLET | Refills: 3 | Status: SHIPPED | OUTPATIENT
Start: 2022-03-28 | End: 2022-12-07 | Stop reason: SDUPTHER

## 2022-06-14 ENCOUNTER — TELEPHONE (OUTPATIENT)
Dept: CARDIOLOGY | Facility: CLINIC | Age: 57
End: 2022-06-14

## 2022-06-14 DIAGNOSIS — E78.5 HYPERLIPIDEMIA LDL GOAL <70: Primary | ICD-10-CM

## 2022-06-14 NOTE — TELEPHONE ENCOUNTER
Called and left VM. Will continue to try to reach patient.     Caity Jama RN  Triage [facility] [place]

## 2022-06-15 NOTE — TELEPHONE ENCOUNTER
Notified patient about need for labs, he said he will complete this this week.    Caity Jama RN  Triage [facility]

## 2022-06-24 ENCOUNTER — LAB (OUTPATIENT)
Dept: LAB | Facility: HOSPITAL | Age: 57
End: 2022-06-24

## 2022-06-24 DIAGNOSIS — E78.5 HYPERLIPIDEMIA LDL GOAL <70: ICD-10-CM

## 2022-06-24 LAB
ALBUMIN SERPL-MCNC: 4.3 G/DL (ref 3.5–5.2)
ALP SERPL-CCNC: 70 U/L (ref 39–117)
ALT SERPL W P-5'-P-CCNC: 23 U/L (ref 1–41)
AST SERPL-CCNC: 21 U/L (ref 1–40)
BILIRUB CONJ SERPL-MCNC: <0.2 MG/DL (ref 0–0.3)
BILIRUB INDIRECT SERPL-MCNC: NORMAL MG/DL
BILIRUB SERPL-MCNC: 1 MG/DL (ref 0–1.2)
CHOLEST SERPL-MCNC: 218 MG/DL (ref 0–200)
HDLC SERPL-MCNC: 30 MG/DL (ref 40–60)
LDLC SERPL CALC-MCNC: 147 MG/DL (ref 0–100)
LDLC/HDLC SERPL: 4.77 {RATIO}
PROT SERPL-MCNC: 7.2 G/DL (ref 6–8.5)
TRIGL SERPL-MCNC: 224 MG/DL (ref 0–150)
VLDLC SERPL-MCNC: 41 MG/DL (ref 5–40)

## 2022-06-24 PROCEDURE — 80076 HEPATIC FUNCTION PANEL: CPT

## 2022-06-24 PROCEDURE — 80061 LIPID PANEL: CPT

## 2022-06-24 PROCEDURE — 36415 COLL VENOUS BLD VENIPUNCTURE: CPT

## 2022-06-27 NOTE — TELEPHONE ENCOUNTER
Please let him know I reviewed his labs.  His cholesterol is not at goal.  His LDL is 147 which is greater than the goal of 70. His liver function is stable.  We could try to either increase the dose of pravastatin or start him on a new medication called Zetia.  Please let me know which she prefers.

## 2022-06-27 NOTE — TELEPHONE ENCOUNTER
Called and left VM. Will continue to try to reach patient.     Caity Jama RN  Triage [facility] [place]

## 2022-06-28 RX ORDER — CARVEDILOL 3.12 MG/1
3.12 TABLET ORAL 2 TIMES DAILY
Qty: 180 TABLET | Refills: 2 | Status: SHIPPED | OUTPATIENT
Start: 2022-06-28 | End: 2022-06-28

## 2022-06-28 RX ORDER — PRAVASTATIN SODIUM 20 MG
20 TABLET ORAL DAILY
Qty: 90 TABLET | Refills: 2 | Status: SHIPPED | OUTPATIENT
Start: 2022-06-28 | End: 2022-06-28

## 2022-06-28 RX ORDER — PRAVASTATIN SODIUM 20 MG
20 TABLET ORAL DAILY
Qty: 90 TABLET | Refills: 2 | Status: SHIPPED | OUTPATIENT
Start: 2022-06-28 | End: 2022-10-17 | Stop reason: SDUPTHER

## 2022-06-28 RX ORDER — PRAVASTATIN SODIUM 20 MG
20 TABLET ORAL DAILY
Qty: 30 TABLET | Refills: 0 | Status: SHIPPED | OUTPATIENT
Start: 2022-06-28 | End: 2022-06-28 | Stop reason: SDUPTHER

## 2022-06-28 RX ORDER — CARVEDILOL 3.12 MG/1
TABLET ORAL
Qty: 180 TABLET | Refills: 2 | Status: SHIPPED | OUTPATIENT
Start: 2022-06-28 | End: 2022-10-17 | Stop reason: SDUPTHER

## 2022-06-28 RX ORDER — CARVEDILOL 3.12 MG/1
3.12 TABLET ORAL 2 TIMES DAILY
Qty: 60 TABLET | Refills: 0 | Status: SHIPPED | OUTPATIENT
Start: 2022-06-28 | End: 2022-06-28 | Stop reason: SDUPTHER

## 2022-06-28 NOTE — TELEPHONE ENCOUNTER
Spoke to patient and he would like to go ahead and increase his pravastatin. He has been out of his pravastatin and coreg for 1 day and he is wondering if we can send a small refill to the [facility] in [facility] and then a 90 day supply of both through [facility].

## 2022-06-28 NOTE — TELEPHONE ENCOUNTER
Called and left VM. Will continue to try to reach patient.     Caity Jama RN  [place] [facility] [place]

## 2022-08-10 DIAGNOSIS — N40.0 BENIGN PROSTATIC HYPERPLASIA, UNSPECIFIED WHETHER LOWER URINARY TRACT SYMPTOMS PRESENT: ICD-10-CM

## 2022-08-10 RX ORDER — TAMSULOSIN HYDROCHLORIDE 0.4 MG/1
CAPSULE ORAL
Qty: 180 CAPSULE | Refills: 3 | Status: SHIPPED | OUTPATIENT
Start: 2022-08-10

## 2022-10-12 ENCOUNTER — TELEPHONE (OUTPATIENT)
Dept: FAMILY MEDICINE CLINIC | Facility: CLINIC | Age: 57
End: 2022-10-12

## 2022-10-12 NOTE — TELEPHONE ENCOUNTER
Pt is aware that labs from [facility] JAIME Griffin labs are waiting to be done. Pt aware to fast at least 8-9 hours and can only have water and black coffee.    Per pt will get labs sometime next week

## 2022-10-14 ENCOUNTER — LAB (OUTPATIENT)
Dept: LAB | Facility: HOSPITAL | Age: 57
End: 2022-10-14

## 2022-10-14 DIAGNOSIS — E78.5 HYPERLIPIDEMIA LDL GOAL <70: ICD-10-CM

## 2022-10-14 LAB
ALBUMIN SERPL-MCNC: 4.6 G/DL (ref 3.5–5.2)
ALP SERPL-CCNC: 65 U/L (ref 39–117)
ALT SERPL W P-5'-P-CCNC: 33 U/L (ref 1–41)
AST SERPL-CCNC: 30 U/L (ref 1–40)
BILIRUB CONJ SERPL-MCNC: <0.2 MG/DL (ref 0–0.3)
BILIRUB INDIRECT SERPL-MCNC: ABNORMAL MG/DL
BILIRUB SERPL-MCNC: 1.3 MG/DL (ref 0–1.2)
CHOLEST SERPL-MCNC: 203 MG/DL (ref 0–200)
HDLC SERPL-MCNC: 32 MG/DL (ref 40–60)
LDLC SERPL CALC-MCNC: 135 MG/DL (ref 0–100)
LDLC/HDLC SERPL: 4.09 {RATIO}
PROT SERPL-MCNC: 7.5 G/DL (ref 6–8.5)
TRIGL SERPL-MCNC: 200 MG/DL (ref 0–150)
VLDLC SERPL-MCNC: 36 MG/DL (ref 5–40)

## 2022-10-14 PROCEDURE — 80061 LIPID PANEL: CPT

## 2022-10-14 PROCEDURE — 80076 HEPATIC FUNCTION PANEL: CPT

## 2022-10-14 PROCEDURE — 36415 COLL VENOUS BLD VENIPUNCTURE: CPT

## 2022-10-17 RX ORDER — PRAVASTATIN SODIUM 40 MG
40 TABLET ORAL DAILY
Qty: 90 TABLET | Refills: 0 | Status: SHIPPED | OUTPATIENT
Start: 2022-10-17 | End: 2022-12-27 | Stop reason: SDUPTHER

## 2022-10-17 RX ORDER — CARVEDILOL 3.12 MG/1
3.12 TABLET ORAL 2 TIMES DAILY
Qty: 180 TABLET | Refills: 2 | Status: SHIPPED | OUTPATIENT
Start: 2022-10-17 | End: 2022-12-27 | Stop reason: SDUPTHER

## 2022-10-20 RX ORDER — ASPIRIN 81 MG/1
TABLET, COATED ORAL
Qty: 30 TABLET | Refills: 0 | Status: SHIPPED | OUTPATIENT
Start: 2022-10-20 | End: 2022-12-07 | Stop reason: SDUPTHER

## 2022-10-20 RX ORDER — PANTOPRAZOLE SODIUM 20 MG/1
TABLET, DELAYED RELEASE ORAL
Qty: 30 TABLET | Refills: 0 | Status: SHIPPED | OUTPATIENT
Start: 2022-10-20 | End: 2022-12-07 | Stop reason: SDUPTHER

## 2022-12-07 ENCOUNTER — OFFICE VISIT (OUTPATIENT)
Dept: FAMILY MEDICINE CLINIC | Facility: CLINIC | Age: 57
End: 2022-12-07

## 2022-12-07 VITALS
DIASTOLIC BLOOD PRESSURE: 72 MMHG | HEIGHT: 67 IN | SYSTOLIC BLOOD PRESSURE: 113 MMHG | WEIGHT: 203 LBS | HEART RATE: 75 BPM | OXYGEN SATURATION: 97 % | BODY MASS INDEX: 31.86 KG/M2

## 2022-12-07 DIAGNOSIS — L57.0 AK (ACTINIC KERATOSIS): ICD-10-CM

## 2022-12-07 DIAGNOSIS — I10 ESSENTIAL HYPERTENSION: ICD-10-CM

## 2022-12-07 DIAGNOSIS — M79.604 BILATERAL LEG PAIN: ICD-10-CM

## 2022-12-07 DIAGNOSIS — K21.9 GASTROESOPHAGEAL REFLUX DISEASE, UNSPECIFIED WHETHER ESOPHAGITIS PRESENT: Primary | ICD-10-CM

## 2022-12-07 DIAGNOSIS — E55.9 VITAMIN D DEFICIENCY: ICD-10-CM

## 2022-12-07 DIAGNOSIS — Z00.00 ROUTINE GENERAL MEDICAL EXAMINATION AT A HEALTH CARE FACILITY: ICD-10-CM

## 2022-12-07 DIAGNOSIS — Z00.00 HEALTHCARE MAINTENANCE: ICD-10-CM

## 2022-12-07 DIAGNOSIS — Z12.2 SCREENING FOR LUNG CANCER: ICD-10-CM

## 2022-12-07 DIAGNOSIS — R53.83 OTHER FATIGUE: ICD-10-CM

## 2022-12-07 DIAGNOSIS — M79.605 BILATERAL LEG PAIN: ICD-10-CM

## 2022-12-07 PROCEDURE — 99214 OFFICE O/P EST MOD 30 MIN: CPT | Performed by: NURSE PRACTITIONER

## 2022-12-07 RX ORDER — LOSARTAN POTASSIUM 100 MG/1
100 TABLET ORAL DAILY
Qty: 90 TABLET | Refills: 1 | Status: SHIPPED | OUTPATIENT
Start: 2022-12-07 | End: 2022-12-27 | Stop reason: SDUPTHER

## 2022-12-07 RX ORDER — ASPIRIN 81 MG/1
81 TABLET ORAL DAILY
Qty: 90 TABLET | Refills: 1 | Status: SHIPPED | OUTPATIENT
Start: 2022-12-07

## 2022-12-07 RX ORDER — PANTOPRAZOLE SODIUM 20 MG/1
20 TABLET, DELAYED RELEASE ORAL DAILY
Qty: 90 TABLET | Refills: 1 | Status: SHIPPED | OUTPATIENT
Start: 2022-12-07

## 2022-12-07 NOTE — PROGRESS NOTES
Chief Complaint  Benign Prostatic Hypertrophy, Heartburn, lower extemity pain, Fatigue, and Hypertension    SUBJECTIVE  Prosper Darling presents to [facility].     History of Present Illness     The patient presents today for a follow-up and medication refills.    Medications - The patient is currently taking baby aspirin, carvedilol 3.125 mg twice daily, losartan 100 mg daily, Protonix 20 mg daily, Flomax 0.8 mg daily, and pravastatin 40 mg daily.    Benign prostatic hyperplasia - The patient reports he has not seen a urologist in approximately 6 months. He states he has been doing well.    GERD - The patient reports his reflux is under good control with Protonix. He states he has some flare ups depending on what he eats or how much coffee he drinks.    Heart disease - The patient reports he was started on a low dose aspirin after his heart surgery. He states he has been taking it daily. He states he was supposed to see cardiology today, but they rescheduled his appointment for 12/27/2022.     Bilateral lower extremity pain and fatigue - The patient reports that recently he has noticed that his legs are fatigued at times. He adds that he has a friend who is also a long-time  who has similar issues that are caused by decreased blood flow and wonders if that could be his problem as well. He reports that his friend has also had heart surgery. The patient states his legs are not numb, but his feet get cold easily. He further states that after walking about 100 yards he has to stop for a little bit for the pain to subside, then he can go on. He denies having a spine x-ray. The patient adds that it is not as bad as it was before his heart surgery, but it is still there.    Cigarette smoking - The patient reports he briefly quit smoking after his heart surgery. He states he smokes approximately 1 pack per day. The patient reports he has tried NicoDerm patches in the past. He  states he has smoked for over 40 years. The patient reports he does not feel normal if he does not smoke a cigarette. He states he has tried vaping. The patient reports he can feel his lungs get a little tight sometimes. He states he wheezes a little bit at night. The patient reports he gets a little phlegmy in his throat and he has to cough that up. He states he has not had a problem with a smoker's cough since his heart surgery.    Hypertension - The patient reports he does not check his blood pressure at home. Here it is 151/79 mmHg; repeated in our office today it is 113/72 mmHg. He states he did take his medications this morning.    Skin thinning and spots - The patient states he has spots on his arms where he bumps into things. He admits he has had a lot of sun exposure.    Frequent hiccups - The patient reports that he frequently gets hiccups, and they last 8 to 10 minutes. He denies chewing a lot of gum or drinking through a straw. He states he does smoke cigarettes often. The patient states that he can not identify a precursor.    Past Medical History:   Diagnosis Date   • CAD (coronary artery disease)    • Enlarged prostate    • Erectile dysfunction    • Fainting spell    • GERD (gastroesophageal reflux disease)    • Hyperlipidemia    • Hypertension    • Peripheral neuropathy    • Slow to wake up after anesthesia    • Unstable angina (HCC)       Family History   Problem Relation Age of Onset   • Heart disease Mother    • No Known Problems Father    • Hepatitis Sister    • Stroke Maternal Grandmother    • Stroke Maternal Grandfather    • Malig Hyperthermia Neg Hx       Past Surgical History:   Procedure Laterality Date   • APPENDECTOMY     • CARDIAC CATHETERIZATION N/A 3/6/2020    Procedure: Coronary angiography, Left heart catheterization, Left ventricular angiography;  Surgeon: Carlos Aparicio MD;  Location: [facility] INVASIVE LOCATION;  Service: Cardiology;  Laterality: N/A;   • CARDIAC SURGERY   "   • COLONOSCOPY N/A 8/20/2021    Procedure: COLONOSCOPY WITH POLYPECTOMY HOT SNARE;  Surgeon: Devon Plata MD;  Location: [facility] ENDOSCOPY;  Service: General;  Laterality: N/A;  COLON POLYP   • CORONARY ARTERY BYPASS GRAFT N/A 3/11/2020    Procedure: SHARRI, MEDIAN STERNOTOMY, CORONARY ARTERY BYPASS GRAFTING X 5   UTILIZING MARIEL INTERNAL MAMMARY ARTERIES AND ENDOSCOPICALLY HARVESTED LEFT SAPHENOUS VEIN, PRP;  Surgeon: Jr Manuel Orourke MD;  Location: [facility] OR;  Service: Cardiothoracic;  Laterality: N/A;   • MOUTH SURGERY          Current Outpatient Medications:   •  aspirin (Aspirin Low Dose) 81 MG EC tablet, Take 1 tablet by mouth Daily., Disp: 90 tablet, Rfl: 1  •  carvedilol (COREG) 3.125 MG tablet, Take 1 tablet by mouth 2 (Two) Times a Day., Disp: 180 tablet, Rfl: 2  •  losartan (COZAAR) 100 MG tablet, Take 1 tablet by mouth Daily., Disp: 90 tablet, Rfl: 1  •  pantoprazole (PROTONIX) 20 MG EC tablet, Take 1 tablet by mouth Daily., Disp: 90 tablet, Rfl: 1  •  pravastatin (PRAVACHOL) 40 MG tablet, Take 1 tablet by mouth Daily., Disp: 90 tablet, Rfl: 0  •  tamsulosin (FLOMAX) 0.4 MG capsule 24 hr capsule, TAKE 2 CAPSULES DAILY, Disp: 180 capsule, Rfl: 3    OBJECTIVE  Vital Signs:   /72 (BP Location: Left arm, Patient Position: Sitting, Cuff Size: Adult)   Pulse 75   Ht 170.2 cm (67\")   Wt 92.1 kg (203 lb)   SpO2 97%   BMI 31.79 kg/m²    Estimated body mass index is 31.79 kg/m² as calculated from the following:    Height as of this encounter: 170.2 cm (67\").    Weight as of this encounter: 92.1 kg (203 lb).     Wt Readings from Last 3 Encounters:   12/07/22 92.1 kg (203 lb)   03/11/22 93 kg (205 lb)   02/11/22 94 kg (207 lb 3.2 oz)     BP Readings from Last 3 Encounters:   12/07/22 113/72   03/11/22 152/90   08/26/21 146/88             Physical Exam  Vitals reviewed.   Constitutional:       Appearance: Normal appearance. He is well-developed.   Neck:      Thyroid: No thyromegaly.      " Vascular: No carotid bruit.   Cardiovascular:      Rate and Rhythm: Normal rate and regular rhythm.      Heart sounds: No murmur heard.    No friction rub. No gallop.   Pulmonary:      Effort: Pulmonary effort is normal.      Breath sounds: Normal breath sounds. No wheezing or rhonchi.   Musculoskeletal:      Right lower leg: No edema.      Left lower leg: No edema.   Skin:     Comments: Bilateral arms with scattered areas of actinic keratosis, left greater than right   Neurological:      General: No focal deficit present.      Mental Status: He is alert and oriented to person, place, and time.      Cranial Nerves: No cranial nerve deficit.   Psychiatric:         Mood and Affect: Mood and affect normal.         Behavior: Behavior normal.         Thought Content: Thought content normal.         Judgment: Judgment normal.          Result Review    CMP    CMP 6/24/22 10/14/22   Albumin 4.30 4.60   Total Bilirubin 1.0 1.3 (A)   Alkaline Phosphatase 70 65   AST (SGOT) 21 30   ALT (SGPT) 23 33   (A) Abnormal value                  Lipid Panel    Lipid Panel 6/24/22 10/14/22   Total Cholesterol 218 (A) 203 (A)   Triglycerides 224 (A) 200 (A)   HDL Cholesterol 30 (A) 32 (A)   VLDL Cholesterol 41 (A) 36   LDL Cholesterol  147 (A) 135 (A)   LDL/HDL Ratio 4.77 4.09   (A) Abnormal value                  No Images in the past 120 days found..     The above data has been reviewed by JAIME Zimmerman  12/07/2022 09:09 EST.    The patient's 10/14/2022 lipid profile was reviewed with him. His cholesterol was 203 mg/dL, which is an improvement from 218 mg/dL. His triglycerides were 200 mg/dL, down from 224 mg/dL. His LDL was 135 mg/dL, down from 147 mg/dL.          Patient Care Team:  Lali Merlos APRN as PCP - General (Nurse Practitioner)           ASSESSMENT & PLAN    Diagnoses and all orders for this visit:    1. Gastroesophageal reflux disease, unspecified whether esophagitis present (Primary)  Comments:  He  will continue on Protonix 20 mg daily.  Orders:  -     pantoprazole (PROTONIX) 20 MG EC tablet; Take 1 tablet by mouth Daily.  Dispense: 90 tablet; Refill: 1    2. Essential hypertension  Comments:  He will continue on carvedilol 3.125 mg twice daily and losartan 100 mg daily.  Orders:  -     losartan (COZAAR) 100 MG tablet; Take 1 tablet by mouth Daily.  Dispense: 90 tablet; Refill: 1    3. Healthcare maintenance  Comments:  We discussed the risks of smoking and benefits of smoking cessation. We will order a low-dose CT scan of the lungs.  Orders:  -     aspirin (Aspirin Low Dose) 81 MG EC tablet; Take 1 tablet by mouth Daily.  Dispense: 90 tablet; Refill: 1    4. Screening for lung cancer  -      CT Chest Low Dose Cancer Screening WO; Future    5. Routine general medical examination at a health care facility  -     CBC & Differential; Future  -     Vitamin B12; Future  -     Lipid Panel; Future  -     TSH; Future  -     Comprehensive Metabolic Panel; Future  -     Urinalysis With Culture If Indicated -; Future    6. Bilateral leg pain  Comments:  Will order venous and arterial ultrasounds of the bilateral lower extremities. Advised to ask cardiology for an echocardiogram when he sees them on 12/27/2022.  Orders:  -     Duplex Lower Extremity Art / Grafts - Bilateral CAR; Future  -     Duplex Venous Lower Extremity - Bilateral CAR; Future    7. Other fatigue  Comments:  We will order labs to check his vitamin D, B12, and thyroid.  Orders:  -     Testosterone; Future    8. Vitamin D deficiency  -     Vitamin D 25 hydroxy; Future    9. AK (actinic keratosis)  Comments:  We will order venous and arterial ultrasounds of the bilateral lower extremities. He was advised to ask cardiology for an echocardiogram when he sees them on 12  Orders:  -     Ambulatory Referral to Dermatology         Tobacco Use: High Risk   • Smoking Tobacco Use: Every Day   • Smokeless Tobacco Use: Never   • Passive Exposure: Not on file        Follow Up     Return if symptoms worsen or fail to improve.      Patient was given instructions and counseling regarding his condition or for health maintenance advice. Please see specific information pulled into the AVS if appropriate.   I have reviewed information obtained and documented by others and I have confirmed the accuracy of this documented note.    JAIME Zimmerman       Transcribed from ambient dictation for JAIME Zimmerman by Olivia Kat.  12/07/22   11:47 EST    Patient or patient representative verbalized consent to the visit recording.  I have personally performed the services described in this document as transcribed by the above individual, and it is both accurate and complete.

## 2022-12-27 ENCOUNTER — OFFICE VISIT (OUTPATIENT)
Dept: CARDIOLOGY | Facility: CLINIC | Age: 57
End: 2022-12-27

## 2022-12-27 VITALS
SYSTOLIC BLOOD PRESSURE: 168 MMHG | HEIGHT: 67 IN | WEIGHT: 204 LBS | DIASTOLIC BLOOD PRESSURE: 90 MMHG | HEART RATE: 64 BPM | BODY MASS INDEX: 32.02 KG/M2

## 2022-12-27 DIAGNOSIS — Z72.0 TOBACCO ABUSE: ICD-10-CM

## 2022-12-27 DIAGNOSIS — N40.0 BENIGN PROSTATIC HYPERPLASIA, UNSPECIFIED WHETHER LOWER URINARY TRACT SYMPTOMS PRESENT: ICD-10-CM

## 2022-12-27 DIAGNOSIS — I25.118 CORONARY ARTERY DISEASE OF NATIVE ARTERY OF NATIVE HEART WITH STABLE ANGINA PECTORIS: Primary | ICD-10-CM

## 2022-12-27 DIAGNOSIS — E78.5 HYPERLIPIDEMIA LDL GOAL <70: ICD-10-CM

## 2022-12-27 DIAGNOSIS — I10 ESSENTIAL HYPERTENSION: ICD-10-CM

## 2022-12-27 DIAGNOSIS — Z95.1 S/P CABG X 5: ICD-10-CM

## 2022-12-27 PROCEDURE — 99214 OFFICE O/P EST MOD 30 MIN: CPT | Performed by: NURSE PRACTITIONER

## 2022-12-27 PROCEDURE — 93000 ELECTROCARDIOGRAM COMPLETE: CPT | Performed by: NURSE PRACTITIONER

## 2022-12-27 RX ORDER — PRAVASTATIN SODIUM 40 MG
40 TABLET ORAL DAILY
Qty: 90 TABLET | Refills: 3 | Status: SHIPPED | OUTPATIENT
Start: 2022-12-27 | End: 2023-01-17 | Stop reason: SDUPTHER

## 2022-12-27 RX ORDER — VARENICLINE TARTRATE 0.5 MG/1
0.5 TABLET, FILM COATED ORAL 2 TIMES DAILY
Qty: 180 TABLET | Refills: 0 | Status: SHIPPED | OUTPATIENT
Start: 2022-12-27

## 2022-12-27 RX ORDER — CARVEDILOL 6.25 MG/1
6.25 TABLET ORAL 2 TIMES DAILY
Qty: 180 TABLET | Refills: 3 | Status: SHIPPED | OUTPATIENT
Start: 2022-12-27

## 2022-12-27 RX ORDER — LOSARTAN POTASSIUM 100 MG/1
100 TABLET ORAL DAILY
Qty: 90 TABLET | Refills: 3 | Status: SHIPPED | OUTPATIENT
Start: 2022-12-27

## 2022-12-27 RX ORDER — VARENICLINE TARTRATE 1 MG/1
1 TABLET, FILM COATED ORAL 2 TIMES DAILY
Qty: 180 TABLET | Refills: 1 | Status: SHIPPED | OUTPATIENT
Start: 2022-12-27

## 2022-12-27 NOTE — PROGRESS NOTES
Date of Office Visit: 2022  Encounter Provider: JAIME Ferrer  Place of Service: [facility] CARDIOLOGY  Patient Name: Prosper Darling  :1965    No chief complaint on file.  : Follow-up CAD    HPI: Prosper Darling is a 57 y.o. male who is a patient of Dr. Aparicio.  He is new to me today presents for a 6-month office follow-up.  He has history of hypertension, hyperlipidemia and coronary artery disease.  In 2020, he presented with unstable angina.  Ultimately underwent CABG x5 (LIMA to LAD, STACIE to first OM, SVG to acute marginal branch of the right coronary, SVG to RCA, SVG to first diagonal).  Left ventricular systolic function remains normal.  He has been tried on atorvastatin and rosuvastatin however stopped due to myalgias.  Patient is now on pravastatin which has been increased due to lipid panel out of target range.     Today patient presents with no complaints of chest pain, pressure, shortness of breath, dizziness or lower extremity edema.  His main complaint is discomfort in his hips and legs.  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  He states that he does not have a lot of energy to do exercise when he comes home.  He takes his medicine on a normal basis.  On today's visit his blood pressure is 168/90.  He does not monitor his blood pressure at home very often.  EKG is stable.  Most recent lipid panel in October continues to show .  Patient notes that he and his wife are making some changes to their diet.  He is also taking his pravastatin as well.  Per patient, his PCP is wanting to do a full lab panel to check his hemoglobin A1c as well.  At that time she will also check his lipid panel make adjustments accordingly.  Fortunately, patient has started smoking again.  He usually smokes about 1 pack of cigarettes a day.  On last office visit, Connor was called in however pharmacy never did take  the medication.  We will try to send it into mail-in pharmacy at this time.  We discussed risk factor management at length.  Patient verbalized understanding and desire to make lifestyle changes.    Previous testing and notes have been reviewed by me.   Past Medical History:   Diagnosis Date   • CAD (coronary artery disease)    • Enlarged prostate    • Erectile dysfunction    • Fainting spell    • GERD (gastroesophageal reflux disease)    • Hyperlipidemia    • Hypertension    • Peripheral neuropathy    • Slow to wake up after anesthesia    • Unstable angina (HCC)        Past Surgical History:   Procedure Laterality Date   • APPENDECTOMY     • CARDIAC CATHETERIZATION N/A 3/6/2020    Procedure: Coronary angiography, Left heart catheterization, Left ventricular angiography;  Surgeon: Carlos Aparicio MD;  Location: [facility] CATH INVASIVE LOCATION;  Service: Cardiology;  Laterality: N/A;   • CARDIAC SURGERY     • COLONOSCOPY N/A 8/20/2021    Procedure: COLONOSCOPY WITH POLYPECTOMY HOT SNARE;  Surgeon: Devon Plata MD;  Location: [facility] ENDOSCOPY;  Service: General;  Laterality: N/A;  COLON POLYP   • CORONARY ARTERY BYPASS GRAFT N/A 3/11/2020    Procedure: SHARRI, MEDIAN STERNOTOMY, CORONARY ARTERY BYPASS GRAFTING X 5   UTILIZING MARIEL INTERNAL MAMMARY ARTERIES AND ENDOSCOPICALLY HARVESTED LEFT SAPHENOUS VEIN, PRP;  Surgeon: Jr Manuel Orourke MD;  Location: [facility] OR;  Service: Cardiothoracic;  Laterality: N/A;   • MOUTH SURGERY         Social History     Socioeconomic History   • Marital status:    Tobacco Use   • Smoking status: Every Day     Packs/day: 1.00     Years: 46.00     Pack years: 46.00     Types: Cigarettes     Start date: 1976   • Smokeless tobacco: Never   • Tobacco comments:     caffeine use, started back smoking 3/2019   Vaping Use   • Vaping Use: Never used   Substance and Sexual Activity   • Alcohol use: Yes     Alcohol/week: 0.0 - 1.0 standard drinks     Comment: occas.   • Drug  use: Never   • Sexual activity: Defer       Family History   Problem Relation Age of Onset   • Heart disease Mother    • No Known Problems Father    • Hepatitis Sister    • Stroke Maternal Grandmother    • Stroke Maternal Grandfather    • Malig Hyperthermia Neg Hx        Review of Systems   Constitutional: Negative.   HENT: Negative.    Eyes: Negative.    Cardiovascular: Negative.    Respiratory: Negative.    Endocrine: Negative.    Hematologic/Lymphatic: Negative.    Skin: Negative.    Musculoskeletal: Positive for stiffness.        Hips   Gastrointestinal: Negative.    Genitourinary: Negative.    Neurological: Negative.    Psychiatric/Behavioral: Negative.    Allergic/Immunologic: Negative.        Allergies   Allergen Reactions   • Atorvastatin Myalgia   • Rosuvastatin Myalgia         Current Outpatient Medications:   •  aspirin (Aspirin Low Dose) 81 MG EC tablet, Take 1 tablet by mouth Daily., Disp: 90 tablet, Rfl: 1  •  carvedilol (COREG) 3.125 MG tablet, Take 1 tablet by mouth 2 (Two) Times a Day., Disp: 180 tablet, Rfl: 2  •  losartan (COZAAR) 100 MG tablet, Take 1 tablet by mouth Daily., Disp: 90 tablet, Rfl: 1  •  pantoprazole (PROTONIX) 20 MG EC tablet, Take 1 tablet by mouth Daily., Disp: 90 tablet, Rfl: 1  •  pravastatin (PRAVACHOL) 40 MG tablet, Take 1 tablet by mouth Daily., Disp: 90 tablet, Rfl: 0  •  tamsulosin (FLOMAX) 0.4 MG capsule 24 hr capsule, TAKE 2 CAPSULES DAILY, Disp: 180 capsule, Rfl: 3      Objective:     There were no vitals filed for this visit.  There is no height or weight on file to calculate BMI.     Left heart cath 3/6/2020:  1. Left main: Normal  2. LAD: Chronic total occlusion mid segment.  Small caliber diagonal branch with 80 to 90% proximal stenosis.  3. LCX: Discrete 80% proximal stenosis.  Discrete 70% proximal OM1 stenosis.  4. RCA: 60 to 70% proximal stenosis and discrete 70% distal stenosis  5.  Normal left ventricular size and systolic function      PHYSICAL  EXAM:    Constitutional:       Appearance: Healthy appearance. Not in distress.   Neck:      Vascular: No JVR. JVD normal.   Pulmonary:      Effort: Pulmonary effort is normal.      Breath sounds: Normal breath sounds. No wheezing. No rhonchi. No rales.   Chest:      Chest wall: Not tender to palpatation.   Cardiovascular:      PMI at left midclavicular line. Normal rate. Regular rhythm. Normal S1. Normal S2.      Murmurs: There is no murmur.      No gallop. No click. No rub.   Pulses:     Intact distal pulses.   Edema:     Peripheral edema absent.   Abdominal:      General: Bowel sounds are normal.      Palpations: Abdomen is soft.      Tenderness: There is no abdominal tenderness.   Musculoskeletal: Normal range of motion.         General: No tenderness. Skin:     General: Skin is warm and dry.   Neurological:      General: No focal deficit present.      Mental Status: Alert and oriented to person, place and time.           ECG 12 Lead    Date/Time: 12/27/2022 2:01 PM  Performed by: Catarina Blackburn APRN  Authorized by: Catarina Blackburn APRN   Comparison: compared with previous ECG from 3/11/2022  Similar to previous ECG  Rhythm: sinus rhythm  Rate: normal  BPM: 64  T flattening: aVL and V1                Assessment:       Diagnosis Plan   1. Coronary artery disease of native artery of native heart with stable angina pectoris (HCC)        2. S/P CABG x 5        3. Hyperlipidemia LDL goal <70        4. Essential hypertension          No orders of the defined types were placed in this encounter.         Plan:       1.  Coronary artery disease status post CABG x5 (LIMA to LAD, STACIE to first OM, SVG to acute marginal branch of the right coronary, SVG to RCA, SVG to first diagonal) 03/2020.  Maintains normal LV systolic function  2.  Hypertension: Elevated.  We will increase carvedilol to 6.25 mg twice daily.  Patient will start monitoring his blood pressure on a normal basis and let me know in the next week or 2  how the readings look.  We will make adjustments accordingly.  3.  Hyperlipidemia: Has failed both atorvastatin and rosuvastatin.  On last visit, he was started on pravastatin.  Dose was increased as repeat lipid panel continues to show LDL elevated.  Most recent lipid panel 10/14/2022 shows total cholesterol 203, triglycerides 200, HDL 32 .  Patient and his wife are making dietary modifications and looking into purchasing a treadmill or stationary bike for exercise.  4. Tobacco abuse: Smoking 1 pack of cigarettes a day.  Sent in prescription for Chantix.    Mr. Darling will follow up with Dr. Aparicio in 6 months.  He will call sooner for any questions or concerns.       Your medication list          Accurate as of December 27, 2022  9:00 AM. If you have any questions, ask your nurse or doctor.            CONTINUE taking these medications      Instructions Last Dose Given Next Dose Due   aspirin 81 MG EC tablet  Commonly known as: Aspirin Low Dose      Take 1 tablet by mouth Daily.       carvedilol 3.125 MG tablet  Commonly known as: COREG      Take 1 tablet by mouth 2 (Two) Times a Day.       losartan 100 MG tablet  Commonly known as: COZAAR      Take 1 tablet by mouth Daily.       pantoprazole 20 MG EC tablet  Commonly known as: PROTONIX      Take 1 tablet by mouth Daily.       pravastatin 40 MG tablet  Commonly known as: PRAVACHOL      Take 1 tablet by mouth Daily.       tamsulosin 0.4 MG capsule 24 hr capsule  Commonly known as: FLOMAX      TAKE 2 CAPSULES DAILY                As always, it has been a pleasure to participate in your patient's care.      Sincerely,       JAIME Oglesby

## 2023-01-14 ENCOUNTER — LAB (OUTPATIENT)
Dept: LAB | Facility: HOSPITAL | Age: 58
End: 2023-01-14
Payer: COMMERCIAL

## 2023-01-14 DIAGNOSIS — Z00.00 ROUTINE GENERAL MEDICAL EXAMINATION AT A HEALTH CARE FACILITY: ICD-10-CM

## 2023-01-14 DIAGNOSIS — E55.9 VITAMIN D DEFICIENCY: ICD-10-CM

## 2023-01-14 DIAGNOSIS — R53.83 OTHER FATIGUE: ICD-10-CM

## 2023-01-14 LAB
25(OH)D3 SERPL-MCNC: 40.7 NG/ML (ref 30–100)
ALBUMIN SERPL-MCNC: 4.3 G/DL (ref 3.5–5.2)
ALBUMIN/GLOB SERPL: 1.5 G/DL
ALP SERPL-CCNC: 69 U/L (ref 39–117)
ALT SERPL W P-5'-P-CCNC: 35 U/L (ref 1–41)
ANION GAP SERPL CALCULATED.3IONS-SCNC: 9.2 MMOL/L (ref 5–15)
AST SERPL-CCNC: 33 U/L (ref 1–40)
BASOPHILS # BLD AUTO: 0.04 10*3/MM3 (ref 0–0.2)
BASOPHILS NFR BLD AUTO: 0.5 % (ref 0–1.5)
BILIRUB SERPL-MCNC: 1.1 MG/DL (ref 0–1.2)
BILIRUB UR QL STRIP: NEGATIVE
BUN SERPL-MCNC: 12 MG/DL (ref 6–20)
BUN/CREAT SERPL: 11.9 (ref 7–25)
CALCIUM SPEC-SCNC: 9.2 MG/DL (ref 8.6–10.5)
CHLORIDE SERPL-SCNC: 104 MMOL/L (ref 98–107)
CHOLEST SERPL-MCNC: 202 MG/DL (ref 0–200)
CLARITY UR: CLEAR
CO2 SERPL-SCNC: 24.8 MMOL/L (ref 22–29)
COLOR UR: YELLOW
CREAT SERPL-MCNC: 1.01 MG/DL (ref 0.76–1.27)
DEPRECATED RDW RBC AUTO: 40.1 FL (ref 37–54)
EGFRCR SERPLBLD CKD-EPI 2021: 85.7 ML/MIN/1.73
EOSINOPHIL # BLD AUTO: 0.15 10*3/MM3 (ref 0–0.4)
EOSINOPHIL NFR BLD AUTO: 1.8 % (ref 0.3–6.2)
ERYTHROCYTE [DISTWIDTH] IN BLOOD BY AUTOMATED COUNT: 13 % (ref 12.3–15.4)
GLOBULIN UR ELPH-MCNC: 2.9 GM/DL
GLUCOSE SERPL-MCNC: 86 MG/DL (ref 65–99)
GLUCOSE UR STRIP-MCNC: NEGATIVE MG/DL
HCT VFR BLD AUTO: 44.1 % (ref 37.5–51)
HDLC SERPL-MCNC: 34 MG/DL (ref 40–60)
HGB BLD-MCNC: 15 G/DL (ref 13–17.7)
HGB UR QL STRIP.AUTO: NEGATIVE
IMM GRANULOCYTES # BLD AUTO: 0.02 10*3/MM3 (ref 0–0.05)
IMM GRANULOCYTES NFR BLD AUTO: 0.2 % (ref 0–0.5)
KETONES UR QL STRIP: NEGATIVE
LDLC SERPL CALC-MCNC: 133 MG/DL (ref 0–100)
LDLC/HDLC SERPL: 3.8 {RATIO}
LEUKOCYTE ESTERASE UR QL STRIP.AUTO: NEGATIVE
LYMPHOCYTES # BLD AUTO: 2.26 10*3/MM3 (ref 0.7–3.1)
LYMPHOCYTES NFR BLD AUTO: 27.7 % (ref 19.6–45.3)
MCH RBC QN AUTO: 29.4 PG (ref 26.6–33)
MCHC RBC AUTO-ENTMCNC: 34 G/DL (ref 31.5–35.7)
MCV RBC AUTO: 86.5 FL (ref 79–97)
MONOCYTES # BLD AUTO: 0.59 10*3/MM3 (ref 0.1–0.9)
MONOCYTES NFR BLD AUTO: 7.2 % (ref 5–12)
NEUTROPHILS NFR BLD AUTO: 5.11 10*3/MM3 (ref 1.7–7)
NEUTROPHILS NFR BLD AUTO: 62.6 % (ref 42.7–76)
NITRITE UR QL STRIP: NEGATIVE
NRBC BLD AUTO-RTO: 0 /100 WBC (ref 0–0.2)
PH UR STRIP.AUTO: 6.5 [PH] (ref 5–8)
PLATELET # BLD AUTO: 306 10*3/MM3 (ref 140–450)
PMV BLD AUTO: 9.6 FL (ref 6–12)
POTASSIUM SERPL-SCNC: 4 MMOL/L (ref 3.5–5.2)
PROT SERPL-MCNC: 7.2 G/DL (ref 6–8.5)
PROT UR QL STRIP: NEGATIVE
RBC # BLD AUTO: 5.1 10*6/MM3 (ref 4.14–5.8)
SODIUM SERPL-SCNC: 138 MMOL/L (ref 136–145)
SP GR UR STRIP: 1.02 (ref 1–1.03)
TESTOST SERPL-MCNC: 645 NG/DL (ref 193–740)
TRIGL SERPL-MCNC: 194 MG/DL (ref 0–150)
TSH SERPL DL<=0.05 MIU/L-ACNC: 1.12 UIU/ML (ref 0.27–4.2)
UROBILINOGEN UR QL STRIP: NORMAL
VIT B12 BLD-MCNC: 379 PG/ML (ref 211–946)
VLDLC SERPL-MCNC: 35 MG/DL (ref 5–40)
WBC NRBC COR # BLD: 8.17 10*3/MM3 (ref 3.4–10.8)

## 2023-01-14 PROCEDURE — 82306 VITAMIN D 25 HYDROXY: CPT

## 2023-01-14 PROCEDURE — 84403 ASSAY OF TOTAL TESTOSTERONE: CPT

## 2023-01-14 PROCEDURE — 81003 URINALYSIS AUTO W/O SCOPE: CPT

## 2023-01-14 PROCEDURE — 80050 GENERAL HEALTH PANEL: CPT

## 2023-01-14 PROCEDURE — 80061 LIPID PANEL: CPT

## 2023-01-14 PROCEDURE — 36415 COLL VENOUS BLD VENIPUNCTURE: CPT

## 2023-01-14 PROCEDURE — 82607 VITAMIN B-12: CPT

## 2023-01-16 ENCOUNTER — TELEPHONE (OUTPATIENT)
Dept: FAMILY MEDICINE CLINIC | Facility: CLINIC | Age: 58
End: 2023-01-16
Payer: COMMERCIAL

## 2023-01-16 NOTE — TELEPHONE ENCOUNTER
Called pt to remind him of CT Chest Screening and he stated that he will call scheduling to get that set up.

## 2023-01-17 DIAGNOSIS — E78.5 ELEVATED LIPIDS: Primary | ICD-10-CM

## 2023-01-17 RX ORDER — PRAVASTATIN SODIUM 80 MG/1
80 TABLET ORAL DAILY
Qty: 90 TABLET | Refills: 0 | Status: SHIPPED | OUTPATIENT
Start: 2023-01-17

## 2023-01-17 RX ORDER — EZETIMIBE 10 MG/1
10 TABLET ORAL DAILY
Qty: 90 TABLET | Refills: 0 | Status: SHIPPED | OUTPATIENT
Start: 2023-01-17

## 2023-03-14 ENCOUNTER — TELEPHONE (OUTPATIENT)
Dept: FAMILY MEDICINE CLINIC | Facility: CLINIC | Age: 58
End: 2023-03-14

## 2023-03-14 NOTE — TELEPHONE ENCOUNTER
Caller: Prosper Darling    Relationship to patient: Self    Best call back number: [phone]    Chief complaint: OFFBOARDING OMAIRA' PATIENT, PAPERWORK    Type of visit: NEW PATIENT    Requested date: AS SOON AS POSSIBLE    Additional notes:  PATIENT IS REQUESTING APPOINTMENT TO HAVE SHORT TERM DISABILITY PAPERWORK FILLED OUT AND SIGNED BY PROVIDER. HE WAS SEEING FRANK CASTANO, BUT HE HAS NOT ESTABLISHED CARE WITH ANOTHER PROVIDER. THE NEXT OPENING FOR NEW PATIENT APPOINTMENT WAS 05/09/2023 BUT HE IS REQUESTING SOONER APPOINTMENT.

## 2023-03-15 ENCOUNTER — OFFICE VISIT (OUTPATIENT)
Dept: FAMILY MEDICINE CLINIC | Facility: CLINIC | Age: 58
End: 2023-03-15
Payer: COMMERCIAL

## 2023-03-15 VITALS
HEART RATE: 73 BPM | WEIGHT: 206 LBS | DIASTOLIC BLOOD PRESSURE: 81 MMHG | OXYGEN SATURATION: 98 % | HEIGHT: 67 IN | SYSTOLIC BLOOD PRESSURE: 155 MMHG | BODY MASS INDEX: 32.33 KG/M2

## 2023-03-15 DIAGNOSIS — F41.9 ANXIETY: ICD-10-CM

## 2023-03-15 DIAGNOSIS — F43.21 GRIEF: Primary | ICD-10-CM

## 2023-03-15 DIAGNOSIS — G47.00 INSOMNIA, UNSPECIFIED TYPE: ICD-10-CM

## 2023-03-15 DIAGNOSIS — F32.A DEPRESSION, UNSPECIFIED DEPRESSION TYPE: ICD-10-CM

## 2023-03-15 PROBLEM — F43.10 PTSD (POST-TRAUMATIC STRESS DISORDER): Status: ACTIVE | Noted: 2023-03-15

## 2023-03-15 PROCEDURE — 99214 OFFICE O/P EST MOD 30 MIN: CPT | Performed by: NURSE PRACTITIONER

## 2023-03-15 RX ORDER — HYDROXYZINE HYDROCHLORIDE 25 MG/1
25 TABLET, FILM COATED ORAL 3 TIMES DAILY PRN
Qty: 30 TABLET | Refills: 0 | Status: SHIPPED | OUTPATIENT
Start: 2023-03-15

## 2023-03-15 NOTE — ASSESSMENT & PLAN NOTE
Patient dealing with significant anxiety in relation to dealing with the death of his wife as well as trying to return to work in the trauma of that situation.  Prescription sent for hydroxyzine as needed, side effects and administration of medication discussed, placing patient off work for 6 weeks, he is seeking counseling, we will follow-up for reevaluation at that time

## 2023-03-15 NOTE — ASSESSMENT & PLAN NOTE
Poor sleep quality due to anxiety grief and depression.  Trial of hydroxyzine as needed, we will follow-up in 6 weeks to reevaluate

## 2023-03-15 NOTE — ASSESSMENT & PLAN NOTE
Patient screens positive for depression, however he declines depression medication at this time, states that he feels like he is going through what would be expected in dealing with the death of his spouse, he is seeking counseling, will let me know if he feels further medication or treatment is needed
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
## 2023-03-15 NOTE — ASSESSMENT & PLAN NOTE
Patient grieving the death of his wife,, we are going to place him off of work for 6 weeks, we will follow-up for reevaluation at that time.  I did refer him to seek counseling, he has an appointment March 22 for initial evaluation.

## 2023-03-23 ENCOUNTER — TELEPHONE (OUTPATIENT)
Dept: DIABETES SERVICES | Facility: HOSPITAL | Age: 58
End: 2023-03-23
Payer: COMMERCIAL

## 2023-03-23 ENCOUNTER — TELEPHONE (OUTPATIENT)
Dept: FAMILY MEDICINE CLINIC | Facility: CLINIC | Age: 58
End: 2023-03-23
Payer: COMMERCIAL

## 2023-03-23 NOTE — TELEPHONE ENCOUNTER
Patient came into office to drop off LA PAPERWORK.. Patient requested it be done by Sunday. Informed patient we are not open that day and not sure if it would be done by Friday..

## 2023-03-29 NOTE — TELEPHONE ENCOUNTER
Caller: RICHARD SAMUEL    Relationship to patient: WITH [employer]    Best call back number: [phone]   FAX: [phone]   ATTENTION OF CLAIM NUMBER [id number]    Patient is needing: RICHARD STATED THAT SHORT TERM DISABILITY FORMS WERE RECEIVED WITHOUT THE REQUIRED MEDICAL RECORDS.   2/2023 AND 3/2023 RECORDS THAT ARE REQUIRED PER FORM THAT WAS SENT WITHOUT THEM .

## 2023-03-29 NOTE — TELEPHONE ENCOUNTER
Contacted [facility] - unable to reach Andres as no ext was left - spoke w/Regina - advised there was no signed records release with [facility] paperwork that we received therefore no records could be released at this time. We need a signed records release in order to release medical records - provided Regina w/fax number and she advised she will have them fax release.

## 2023-04-17 ENCOUNTER — TELEPHONE (OUTPATIENT)
Dept: CARDIOLOGY | Facility: CLINIC | Age: 58
End: 2023-04-17
Payer: COMMERCIAL

## 2023-04-17 DIAGNOSIS — I25.118 CORONARY ARTERY DISEASE OF NATIVE ARTERY OF NATIVE HEART WITH STABLE ANGINA PECTORIS: Primary | ICD-10-CM

## 2023-04-17 DIAGNOSIS — E78.5 HYPERLIPIDEMIA LDL GOAL <70: ICD-10-CM

## 2023-04-17 NOTE — TELEPHONE ENCOUNTER
Please remind patient to get repeat labs since increasing the pravastatin and starting Zetia.  If this is not successful in lowering his LDL, we will need to consider a PCSK9 inhibitor.

## 2023-04-22 ENCOUNTER — LAB (OUTPATIENT)
Dept: LAB | Facility: HOSPITAL | Age: 58
End: 2023-04-22
Payer: COMMERCIAL

## 2023-04-22 DIAGNOSIS — E78.5 HYPERLIPIDEMIA LDL GOAL <70: ICD-10-CM

## 2023-04-22 DIAGNOSIS — E78.5 ELEVATED LIPIDS: ICD-10-CM

## 2023-04-22 LAB
ALBUMIN SERPL-MCNC: 4.4 G/DL (ref 3.5–5.2)
ALBUMIN/GLOB SERPL: 1.3 G/DL
ALP SERPL-CCNC: 75 U/L (ref 39–117)
ALT SERPL W P-5'-P-CCNC: 30 U/L (ref 1–41)
ANION GAP SERPL CALCULATED.3IONS-SCNC: 9.5 MMOL/L (ref 5–15)
AST SERPL-CCNC: 27 U/L (ref 1–40)
BILIRUB CONJ SERPL-MCNC: 0.2 MG/DL (ref 0–0.3)
BILIRUB SERPL-MCNC: 1.2 MG/DL (ref 0–1.2)
BUN SERPL-MCNC: 12 MG/DL (ref 8–23)
BUN/CREAT SERPL: 12.5 (ref 7–25)
CALCIUM SPEC-SCNC: 9.9 MG/DL (ref 8.6–10.5)
CHLORIDE SERPL-SCNC: 101 MMOL/L (ref 98–107)
CHOLEST SERPL-MCNC: 163 MG/DL (ref 0–200)
CO2 SERPL-SCNC: 26.5 MMOL/L (ref 22–29)
CREAT SERPL-MCNC: 0.96 MG/DL (ref 0.76–1.27)
EGFRCR SERPLBLD CKD-EPI 2021: 90.5 ML/MIN/1.73
GLOBULIN UR ELPH-MCNC: 3.4 GM/DL
GLUCOSE SERPL-MCNC: 106 MG/DL (ref 65–99)
HDLC SERPL-MCNC: 37 MG/DL (ref 40–60)
LDLC SERPL CALC-MCNC: 94 MG/DL (ref 0–100)
LDLC/HDLC SERPL: 2.39 {RATIO}
POTASSIUM SERPL-SCNC: 5.1 MMOL/L (ref 3.5–5.2)
PROT SERPL-MCNC: 7.8 G/DL (ref 6–8.5)
SODIUM SERPL-SCNC: 137 MMOL/L (ref 136–145)
TRIGL SERPL-MCNC: 187 MG/DL (ref 0–150)
VLDLC SERPL-MCNC: 32 MG/DL (ref 5–40)

## 2023-04-22 PROCEDURE — 36415 COLL VENOUS BLD VENIPUNCTURE: CPT

## 2023-04-22 PROCEDURE — 80061 LIPID PANEL: CPT

## 2023-04-22 PROCEDURE — 82248 BILIRUBIN DIRECT: CPT

## 2023-04-22 PROCEDURE — 80053 COMPREHEN METABOLIC PANEL: CPT

## 2023-04-25 ENCOUNTER — OFFICE VISIT (OUTPATIENT)
Dept: FAMILY MEDICINE CLINIC | Facility: CLINIC | Age: 58
End: 2023-04-25
Payer: COMMERCIAL

## 2023-04-25 VITALS
WEIGHT: 207.6 LBS | DIASTOLIC BLOOD PRESSURE: 62 MMHG | HEIGHT: 67 IN | HEART RATE: 85 BPM | OXYGEN SATURATION: 98 % | SYSTOLIC BLOOD PRESSURE: 138 MMHG | BODY MASS INDEX: 32.58 KG/M2

## 2023-04-25 DIAGNOSIS — R73.9 HYPERGLYCEMIA: ICD-10-CM

## 2023-04-25 DIAGNOSIS — K21.9 GASTROESOPHAGEAL REFLUX DISEASE, UNSPECIFIED WHETHER ESOPHAGITIS PRESENT: ICD-10-CM

## 2023-04-25 DIAGNOSIS — F41.9 ANXIETY: ICD-10-CM

## 2023-04-25 DIAGNOSIS — Z72.0 TOBACCO ABUSE: ICD-10-CM

## 2023-04-25 DIAGNOSIS — I10 ESSENTIAL HYPERTENSION: ICD-10-CM

## 2023-04-25 DIAGNOSIS — F43.21 GRIEF: ICD-10-CM

## 2023-04-25 DIAGNOSIS — E78.5 HYPERLIPIDEMIA LDL GOAL <70: ICD-10-CM

## 2023-04-25 DIAGNOSIS — Z12.2 ENCOUNTER FOR SCREENING FOR LUNG CANCER: ICD-10-CM

## 2023-04-25 DIAGNOSIS — Z00.00 ANNUAL PHYSICAL EXAM: Primary | ICD-10-CM

## 2023-04-25 PROBLEM — F43.10 PTSD (POST-TRAUMATIC STRESS DISORDER): Status: RESOLVED | Noted: 2023-03-15 | Resolved: 2023-04-25

## 2023-04-25 RX ORDER — PRAVASTATIN SODIUM 80 MG/1
80 TABLET ORAL DAILY
Qty: 90 TABLET | Refills: 1 | Status: SHIPPED | OUTPATIENT
Start: 2023-04-25

## 2023-04-25 RX ORDER — PANTOPRAZOLE SODIUM 20 MG/1
20 TABLET, DELAYED RELEASE ORAL DAILY
Qty: 90 TABLET | Refills: 1 | Status: SHIPPED | OUTPATIENT
Start: 2023-04-25

## 2023-04-25 RX ORDER — EZETIMIBE 10 MG/1
10 TABLET ORAL DAILY
Qty: 90 TABLET | Refills: 1 | Status: SHIPPED | OUTPATIENT
Start: 2023-04-25

## 2023-04-25 NOTE — ASSESSMENT & PLAN NOTE
Blood pressure is well controlled upon manual recheck at 128/62 with Coreg and losartan, patient still seeing cardiology regularly, continue current medications

## 2023-04-25 NOTE — PROGRESS NOTES
I am covering Saundra Robertson's in basket today.     Saundra ordered labs to be completed since increasing pravastatin and starting Zetia.  Bilirubin normal.  CMP normal except for mildly elevated glucose of 106.  Lipid panel has improved.  LDL goal is less than 70 and his is 94.  Triglyceride goal is less than 150 and his is 187.  I will defer to Saundra upon her return if she wants to start a PSKS9.  Please give patient preliminary results.  Thank you

## 2023-04-25 NOTE — ASSESSMENT & PLAN NOTE
Patient continues to grieve the loss of his wife, however he feels that he has improved and is ready to return to work, scheduled to return to work Monday.  Follow-up as needed

## 2023-04-25 NOTE — ASSESSMENT & PLAN NOTE
Patient reports that anxiety is in better control, does still sometimes cause him difficulty sleeping, but he is slowly improving, follow-up as needed

## 2023-04-25 NOTE — PROGRESS NOTES
Chief Complaint  Follow-up hypertension, hyperlipidemia, anxiety and depression, annual exam    SUBJECTIVE  Prosper Darling presents to [facility] for 6 week follow up to review labs, annual exam, med refills..    Follow-up hypertension, hyperlipidemia, anxiety and depression, annual exam    Patient reports he is doing better with the loss of his wife, states that he is trying to keep himself busy and distracted so that he does not dwell on it too much, states still sleeping poorly at times but is doing better, adamantly denies any SI, states that he does take the hydroxyzine as needed but it does make him a bit groggy the next days and does not take it often, ready to return to work Monday.    History of Present Illness  Past Medical History:   Diagnosis Date   • CAD (coronary artery disease)    • Enlarged prostate    • Erectile dysfunction    • Fainting spell    • GERD (gastroesophageal reflux disease)    • Hyperlipidemia    • Hypertension    • Peripheral neuropathy    • Slow to wake up after anesthesia    • Unstable angina       Family History   Problem Relation Age of Onset   • Heart disease Mother    • No Known Problems Father    • Hepatitis Sister    • Stroke Maternal Grandmother    • Stroke Maternal Grandfather    • Malig Hyperthermia Neg Hx       Past Surgical History:   Procedure Laterality Date   • APPENDECTOMY     • CARDIAC CATHETERIZATION N/A 3/6/2020    Procedure: Coronary angiography, Left heart catheterization, Left ventricular angiography;  Surgeon: Carlos Aparicio MD;  Location: [facility] CATH INVASIVE LOCATION;  Service: Cardiology;  Laterality: N/A;   • CARDIAC SURGERY     • COLONOSCOPY N/A 8/20/2021    Procedure: COLONOSCOPY WITH POLYPECTOMY HOT SNARE;  Surgeon: Devon Plata MD;  Location: [facility] ENDOSCOPY;  Service: General;  Laterality: N/A;  COLON POLYP   • CORONARY ARTERY BYPASS GRAFT N/A 3/11/2020    Procedure: SHARRI, MEDIAN STERNOTOMY, CORONARY ARTERY  "BYPASS GRAFTING X 5   UTILIZING MARIEL INTERNAL MAMMARY ARTERIES AND ENDOSCOPICALLY HARVESTED LEFT SAPHENOUS VEIN, PRP;  Surgeon: Jr Manuel Orourke MD;  Location: [facility];  Service: Cardiothoracic;  Laterality: N/A;   • MOUTH SURGERY          Current Outpatient Medications:   •  aspirin (Aspirin Low Dose) 81 MG EC tablet, Take 1 tablet by mouth Daily., Disp: 90 tablet, Rfl: 1  •  carvedilol (COREG) 6.25 MG tablet, Take 1 tablet by mouth 2 (Two) Times a Day., Disp: 180 tablet, Rfl: 3  •  ezetimibe (ZETIA) 10 MG tablet, Take 1 tablet by mouth Daily., Disp: 90 tablet, Rfl: 1  •  hydrOXYzine (ATARAX) 25 MG tablet, Take 1 tablet by mouth 3 (Three) Times a Day As Needed for Anxiety., Disp: 30 tablet, Rfl: 0  •  losartan (COZAAR) 100 MG tablet, Take 1 tablet by mouth Daily., Disp: 90 tablet, Rfl: 3  •  pantoprazole (PROTONIX) 20 MG EC tablet, Take 1 tablet by mouth Daily., Disp: 90 tablet, Rfl: 1  •  pravastatin (PRAVACHOL) 80 MG tablet, Take 1 tablet by mouth Daily., Disp: 90 tablet, Rfl: 1  •  tamsulosin (FLOMAX) 0.4 MG capsule 24 hr capsule, TAKE 2 CAPSULES DAILY, Disp: 180 capsule, Rfl: 3    OBJECTIVE  Vital Signs:   /62 (BP Location: Right arm, Patient Position: Sitting, Cuff Size: Adult)   Pulse 85   Ht 170.2 cm (67\")   Wt 94.2 kg (207 lb 9.6 oz)   SpO2 98%   BMI 32.51 kg/m²    Estimated body mass index is 32.51 kg/m² as calculated from the following:    Height as of this encounter: 170.2 cm (67\").    Weight as of this encounter: 94.2 kg (207 lb 9.6 oz).     Wt Readings from Last 3 Encounters:   04/25/23 94.2 kg (207 lb 9.6 oz)   03/15/23 93.4 kg (206 lb)   12/27/22 92.5 kg (204 lb)     BP Readings from Last 3 Encounters:   04/25/23 138/62   03/15/23 155/81   12/27/22 168/90       Physical Exam  Vitals reviewed.   Constitutional:       Appearance: Normal appearance. He is well-developed.   HENT:      Head: Normocephalic and atraumatic.      Right Ear: External ear normal.      Left Ear: External " ear normal.   Eyes:      Conjunctiva/sclera: Conjunctivae normal.      Pupils: Pupils are equal, round, and reactive to light.   Cardiovascular:      Rate and Rhythm: Normal rate and regular rhythm.      Heart sounds: No murmur heard.    No friction rub. No gallop.   Pulmonary:      Effort: Pulmonary effort is normal.      Breath sounds: Normal breath sounds. No wheezing or rhonchi.   Skin:     General: Skin is warm and dry.   Neurological:      Mental Status: He is alert and oriented to person, place, and time.      Cranial Nerves: No cranial nerve deficit.   Psychiatric:         Mood and Affect: Mood and affect normal.         Behavior: Behavior normal.         Thought Content: Thought content normal.         Judgment: Judgment normal.          Result Review    CMP        10/14/2022    07:27 1/14/2023    07:44 4/22/2023    11:19   CMP   Glucose  86   106     BUN  12   12     Creatinine  1.01   0.96     EGFR  85.7   90.5     Sodium  138   137     Potassium  4.0   5.1     Chloride  104   101     Calcium  9.2   9.9     Total Protein 7.5   7.2   7.8     Albumin 4.60   4.3   4.4     Globulin  2.9   3.4     Total Bilirubin 1.3   1.1   1.2     Alkaline Phosphatase 65   69   75     AST (SGOT) 30   33   27     ALT (SGPT) 33   35   30     Albumin/Globulin Ratio  1.5   1.3     BUN/Creatinine Ratio  11.9   12.5     Anion Gap  9.2   9.5       CBC        1/14/2023    07:44   CBC   WBC 8.17     RBC 5.10     Hemoglobin 15.0     Hematocrit 44.1     MCV 86.5     MCH 29.4     MCHC 34.0     RDW 13.0     Platelets 306       Lipid Panel        10/14/2022    07:27 1/14/2023    07:44 4/22/2023    11:19   Lipid Panel   Total Cholesterol 203   202   163     Triglycerides 200   194   187     HDL Cholesterol 32   34   37     VLDL Cholesterol 36   35   32     LDL Cholesterol  135   133   94     LDL/HDL Ratio 4.09   3.80   2.39       TSH        1/14/2023    07:44   TSH   TSH 1.120             No Images in the past 120 days found..     The  above data has been reviewed by JAIME Gay 04/25/2023 06:16 EDT.          Patient Care Team:  Suellen Jimenez APRN as PCP - General (Nurse Practitioner)    BMI is >= 30 and <35. (Class 1 Obesity). The following options were offered after discussion;: exercise counseling/recommendations and nutrition counseling/recommendations       ASSESSMENT & PLAN    Diagnoses and all orders for this visit:    1. Annual physical exam (Primary)  -      CT Chest Low Dose Cancer Screening WO; Future  -     Hemoglobin A1c; Future    2. Anxiety  Assessment & Plan:  Patient reports that anxiety is in better control, does still sometimes cause him difficulty sleeping, but he is slowly improving, follow-up as needed      3. Grief  Assessment & Plan:  Patient continues to grieve the loss of his wife, however he feels that he has improved and is ready to return to work, scheduled to return to work Monday.  Follow-up as needed      4. Tobacco abuse  Assessment & Plan:  Patient continues to smoke, not ready to quit at this time    Orders:  -      CT Chest Low Dose Cancer Screening WO; Future    5. Encounter for screening for lung cancer  -      CT Chest Low Dose Cancer Screening WO; Future    6. Hyperglycemia  -     Hemoglobin A1c; Future    7. Gastroesophageal reflux disease, unspecified whether esophagitis present  Comments:  He will continue on Protonix 20 mg daily.  Orders:  -     pantoprazole (PROTONIX) 20 MG EC tablet; Take 1 tablet by mouth Daily.  Dispense: 90 tablet; Refill: 1    8. Essential hypertension  Assessment & Plan:  Blood pressure is well controlled upon manual recheck at 128/62 with Coreg and losartan, patient still seeing cardiology regularly, continue current medications      9. Hyperlipidemia LDL goal <70  -     ezetimibe (ZETIA) 10 MG tablet; Take 1 tablet by mouth Daily.  Dispense: 90 tablet; Refill: 1  -     pravastatin (PRAVACHOL) 80 MG tablet; Take 1 tablet by mouth Daily.  Dispense: 90 tablet;  Refill: 1       Tobacco Use: High Risk   • Smoking Tobacco Use: Every Day   • Smokeless Tobacco Use: Never   • Passive Exposure: Not on file     The patient is advised to quit smoking, attempt to lose weight, continue current medications and return for routine annual checkups.    Follow Up     Return in about 6 months (around 10/25/2023), or if symptoms worsen or fail to improve.        Patient was given instructions and counseling regarding his condition or for health maintenance advice. Please see specific information pulled into the AVS if appropriate.   I have reviewed information obtained and documented by others and I have confirmed the accuracy of this documented note.    JAIME Gay

## 2023-05-05 DIAGNOSIS — E78.5 HYPERLIPIDEMIA LDL GOAL <70: ICD-10-CM

## 2023-05-05 RX ORDER — EZETIMIBE 10 MG/1
10 TABLET ORAL DAILY
Qty: 90 TABLET | Refills: 3 | Status: SHIPPED | OUTPATIENT
Start: 2023-05-05

## 2023-06-12 ENCOUNTER — HOSPITAL ENCOUNTER (OUTPATIENT)
Dept: CT IMAGING | Facility: HOSPITAL | Age: 58
Discharge: HOME | End: 2023-06-12
Admitting: NURSE PRACTITIONER
Payer: COMMERCIAL

## 2023-06-12 DIAGNOSIS — Z12.2 ENCOUNTER FOR SCREENING FOR LUNG CANCER: ICD-10-CM

## 2023-06-12 DIAGNOSIS — Z72.0 TOBACCO ABUSE: ICD-10-CM

## 2023-06-12 DIAGNOSIS — Z00.00 ANNUAL PHYSICAL EXAM: ICD-10-CM

## 2023-06-12 PROCEDURE — 71271 CT THORAX LUNG CANCER SCR C-: CPT

## 2023-08-07 DIAGNOSIS — N40.0 BENIGN PROSTATIC HYPERPLASIA, UNSPECIFIED WHETHER LOWER URINARY TRACT SYMPTOMS PRESENT: ICD-10-CM

## 2023-08-07 RX ORDER — TAMSULOSIN HYDROCHLORIDE 0.4 MG/1
CAPSULE ORAL
Qty: 180 CAPSULE | Refills: 3 | OUTPATIENT
Start: 2023-08-07

## 2023-09-06 ENCOUNTER — TELEPHONE (OUTPATIENT)
Dept: CARDIOLOGY | Facility: CLINIC | Age: 58
End: 2023-09-06
Payer: COMMERCIAL

## 2023-09-06 DIAGNOSIS — E78.5 HYPERLIPIDEMIA LDL GOAL <70: Primary | ICD-10-CM

## 2023-09-06 NOTE — TELEPHONE ENCOUNTER
----- Message from JAIME Hoyt sent at 9/6/2023 11:46 AM EDT -----  Can you call and remind patient to have repeat labs since starting ezitimibe aka zetia    Thanks

## 2023-10-25 ENCOUNTER — OFFICE VISIT (OUTPATIENT)
Dept: FAMILY MEDICINE CLINIC | Facility: CLINIC | Age: 58
End: 2023-10-25
Payer: COMMERCIAL

## 2023-10-25 VITALS
DIASTOLIC BLOOD PRESSURE: 76 MMHG | WEIGHT: 212 LBS | OXYGEN SATURATION: 97 % | HEART RATE: 74 BPM | SYSTOLIC BLOOD PRESSURE: 132 MMHG | BODY MASS INDEX: 33.27 KG/M2 | HEIGHT: 67 IN

## 2023-10-25 DIAGNOSIS — I10 ESSENTIAL HYPERTENSION: Primary | ICD-10-CM

## 2023-10-25 DIAGNOSIS — K21.9 GASTROESOPHAGEAL REFLUX DISEASE, UNSPECIFIED WHETHER ESOPHAGITIS PRESENT: ICD-10-CM

## 2023-10-25 DIAGNOSIS — R73.9 HYPERGLYCEMIA: ICD-10-CM

## 2023-10-25 DIAGNOSIS — Z13.29 THYROID DISORDER SCREEN: ICD-10-CM

## 2023-10-25 DIAGNOSIS — Z12.5 SCREENING PSA (PROSTATE SPECIFIC ANTIGEN): ICD-10-CM

## 2023-10-25 DIAGNOSIS — Z00.00 HEALTHCARE MAINTENANCE: ICD-10-CM

## 2023-10-25 RX ORDER — ASPIRIN 81 MG/1
81 TABLET ORAL DAILY
Qty: 90 TABLET | Refills: 1 | Status: SHIPPED | OUTPATIENT
Start: 2023-10-25

## 2023-10-25 RX ORDER — CARVEDILOL 6.25 MG/1
6.25 TABLET ORAL 2 TIMES DAILY
Qty: 180 TABLET | Refills: 0 | Status: SHIPPED | OUTPATIENT
Start: 2023-10-25

## 2023-10-25 RX ORDER — PANTOPRAZOLE SODIUM 20 MG/1
20 TABLET, DELAYED RELEASE ORAL DAILY
Qty: 90 TABLET | Refills: 1 | Status: SHIPPED | OUTPATIENT
Start: 2023-10-25

## 2023-10-25 RX ORDER — LOSARTAN POTASSIUM 100 MG/1
100 TABLET ORAL DAILY
Qty: 90 TABLET | Refills: 0 | Status: SHIPPED | OUTPATIENT
Start: 2023-10-25

## 2023-10-25 NOTE — PROGRESS NOTES
Chief Complaint  Anxiety, Hypertension, and Hyperlipidemia    SUBJECTIVE  Prosper Darling presents to [facility] 6 month follow up.    Pt states doing fairly well, still adjusting to life without his wife after her passing, but is doing better    Declines Flu Vaccine    History of Present Illness  Past Medical History:   Diagnosis Date    CAD (coronary artery disease)     Enlarged prostate     Erectile dysfunction     Fainting spell     GERD (gastroesophageal reflux disease)     Hyperlipidemia     Hypertension     Peripheral neuropathy     Slow to wake up after anesthesia     Unstable angina       Family History   Problem Relation Age of Onset    Heart disease Mother     No Known Problems Father     Hepatitis Sister     Stroke Maternal Grandmother     Stroke Maternal Grandfather     Malig Hyperthermia Neg Hx       Past Surgical History:   Procedure Laterality Date    APPENDECTOMY      CARDIAC CATHETERIZATION N/A 3/6/2020    Procedure: Coronary angiography, Left heart catheterization, Left ventricular angiography;  Surgeon: Carlos Aparicio MD;  Location: [facility] CATH INVASIVE LOCATION;  Service: Cardiology;  Laterality: N/A;    CARDIAC SURGERY      COLONOSCOPY N/A 8/20/2021    Procedure: COLONOSCOPY WITH POLYPECTOMY HOT SNARE;  Surgeon: Devon Plata MD;  Location: [facility] ENDOSCOPY;  Service: General;  Laterality: N/A;  COLON POLYP    CORONARY ARTERY BYPASS GRAFT N/A 3/11/2020    Procedure: SHARRI, MEDIAN STERNOTOMY, CORONARY ARTERY BYPASS GRAFTING X 5   UTILIZING MARIEL INTERNAL MAMMARY ARTERIES AND ENDOSCOPICALLY HARVESTED LEFT SAPHENOUS VEIN, PRP;  Surgeon: Jr Manuel Orourke MD;  Location: [facility] OR;  Service: Cardiothoracic;  Laterality: N/A;    MOUTH SURGERY          Current Outpatient Medications:     aspirin 81 MG EC tablet, Take 1 tablet by mouth Daily., Disp: 90 tablet, Rfl: 1    carvedilol (COREG) 6.25 MG tablet, Take 1 tablet by mouth 2 (Two) Times a Day.,  "Disp: 180 tablet, Rfl: 0    ezetimibe (ZETIA) 10 MG tablet, Take 1 tablet by mouth Daily., Disp: 90 tablet, Rfl: 3    losartan (COZAAR) 100 MG tablet, Take 1 tablet by mouth Daily., Disp: 90 tablet, Rfl: 0    pantoprazole (PROTONIX) 20 MG EC tablet, Take 1 tablet by mouth Daily., Disp: 90 tablet, Rfl: 1    tamsulosin (FLOMAX) 0.4 MG capsule 24 hr capsule, TAKE 2 CAPSULES DAILY, Disp: 180 capsule, Rfl: 3    pravastatin (PRAVACHOL) 80 MG tablet, Take 1 tablet by mouth Daily. (Patient not taking: Reported on 10/25/2023), Disp: 90 tablet, Rfl: 1    OBJECTIVE  Vital Signs:   /76   Pulse 74   Ht 170.2 cm (67\")   Wt 96.2 kg (212 lb)   SpO2 97%   BMI 33.20 kg/m²    Estimated body mass index is 33.2 kg/m² as calculated from the following:    Height as of this encounter: 170.2 cm (67\").    Weight as of this encounter: 96.2 kg (212 lb).     Wt Readings from Last 3 Encounters:   10/25/23 96.2 kg (212 lb)   04/25/23 94.2 kg (207 lb 9.6 oz)   03/15/23 93.4 kg (206 lb)     BP Readings from Last 3 Encounters:   10/25/23 132/76   04/25/23 138/62   03/15/23 155/81       Physical Exam  Vitals reviewed.   Constitutional:       Appearance: Normal appearance. He is well-developed.   HENT:      Head: Normocephalic and atraumatic.      Right Ear: External ear normal.      Left Ear: External ear normal.   Eyes:      Conjunctiva/sclera: Conjunctivae normal.      Pupils: Pupils are equal, round, and reactive to light.   Cardiovascular:      Rate and Rhythm: Normal rate and regular rhythm.      Heart sounds: No murmur heard.     No friction rub. No gallop.   Pulmonary:      Effort: Pulmonary effort is normal.      Breath sounds: Normal breath sounds. No wheezing or rhonchi.   Skin:     General: Skin is warm and dry.   Neurological:      Mental Status: He is alert and oriented to person, place, and time.      Cranial Nerves: No cranial nerve deficit.   Psychiatric:         Mood and Affect: Mood and affect normal.         Behavior: " Behavior normal.         Thought Content: Thought content normal.         Judgment: Judgment normal.          Result Review    CMP          1/14/2023    07:44 4/22/2023    11:19   CMP   Glucose 86  106    BUN 12  12    Creatinine 1.01  0.96    EGFR 85.7  90.5    Sodium 138  137    Potassium 4.0  5.1    Chloride 104  101    Calcium 9.2  9.9    Total Protein 7.2  7.8    Albumin 4.3  4.4    Globulin 2.9  3.4    Total Bilirubin 1.1  1.2    Alkaline Phosphatase 69  75    AST (SGOT) 33  27    ALT (SGPT) 35  30    Albumin/Globulin Ratio 1.5  1.3    BUN/Creatinine Ratio 11.9  12.5    Anion Gap 9.2  9.5      CBC          1/14/2023    07:44   CBC   WBC 8.17    RBC 5.10    Hemoglobin 15.0    Hematocrit 44.1    MCV 86.5    MCH 29.4    MCHC 34.0    RDW 13.0    Platelets 306      Lipid Panel          1/14/2023    07:44 4/22/2023    11:19   Lipid Panel   Total Cholesterol 202  163    Triglycerides 194  187    HDL Cholesterol 34  37    VLDL Cholesterol 35  32    LDL Cholesterol  133  94    LDL/HDL Ratio 3.80  2.39      TSH          1/14/2023    07:44   TSH   TSH 1.120          No Images in the past 120 days found..     The above data has been reviewed by JAIME aGy 10/25/2023 11:25 EDT.          Patient Care Team:  Suellen Jimenez APRN as PCP - General (Nurse Practitioner)            ASSESSMENT & PLAN    Diagnoses and all orders for this visit:    1. Essential hypertension (Primary)  Comments:  He will continue on carvedilol 3.125 mg twice daily and losartan 100 mg daily.  Orders:  -     carvedilol (COREG) 6.25 MG tablet; Take 1 tablet by mouth 2 (Two) Times a Day.  Dispense: 180 tablet; Refill: 0  -     losartan (COZAAR) 100 MG tablet; Take 1 tablet by mouth Daily.  Dispense: 90 tablet; Refill: 0  -     Comprehensive Metabolic Panel; Future  -     CBC & Differential; Future  -     Lipid Panel; Future    2. Healthcare maintenance  -     aspirin 81 MG EC tablet; Take 1 tablet by mouth Daily.  Dispense: 90 tablet;  Refill: 1    3. Gastroesophageal reflux disease, unspecified whether esophagitis present  Overview:  Stable and well-controlled with Protonix, continue current dose of    Orders:  -     pantoprazole (PROTONIX) 20 MG EC tablet; Take 1 tablet by mouth Daily.  Dispense: 90 tablet; Refill: 1    4. Hyperglycemia  Comments:  last fasting labs showed elevated glucose, will check A1C today  Orders:  -     Hemoglobin A1c; Future    5. Thyroid disorder screen  -     TSH Rfx On Abnormal To Free T4; Future    6. Screening PSA (prostate specific antigen)  -     PSA SCREENING; Future         Tobacco Use: High Risk (10/25/2023)    Patient History     Smoking Tobacco Use: Every Day     Smokeless Tobacco Use: Never     Passive Exposure: Not on file       Follow Up     Return in about 6 months (around 4/25/2024), or if symptoms worsen or fail to improve.        Patient was given instructions and counseling regarding his condition or for health maintenance advice. Please see specific information pulled into the AVS if appropriate.   I have reviewed information obtained and documented by others and I have confirmed the accuracy of this documented note.    JAIME Gay

## 2023-11-01 ENCOUNTER — OFFICE VISIT (OUTPATIENT)
Dept: FAMILY MEDICINE CLINIC | Facility: CLINIC | Age: 58
End: 2023-11-01
Payer: COMMERCIAL

## 2023-11-01 VITALS
WEIGHT: 209.4 LBS | SYSTOLIC BLOOD PRESSURE: 159 MMHG | HEIGHT: 67 IN | HEART RATE: 80 BPM | BODY MASS INDEX: 32.87 KG/M2 | OXYGEN SATURATION: 98 % | DIASTOLIC BLOOD PRESSURE: 89 MMHG

## 2023-11-01 DIAGNOSIS — L60.0 INGROWN TOENAIL OF LEFT FOOT: Primary | ICD-10-CM

## 2023-11-01 PROCEDURE — 99213 OFFICE O/P EST LOW 20 MIN: CPT

## 2023-11-01 RX ORDER — TRIAMCINOLONE ACETONIDE 5 MG/G
1 OINTMENT TOPICAL 2 TIMES DAILY
Qty: 28 G | Refills: 0 | Status: SHIPPED | OUTPATIENT
Start: 2023-11-01 | End: 2023-11-15

## 2023-11-01 NOTE — PROGRESS NOTES
Chief Complaint  Toe Pain    SUBJECTIVE  Prosper Darling presents to [facility]    History of Present Illness  Patient is a 58-year-old male presents today for acute visit.  He is a patient of JAIME Sierra.  He c/o of left great toe pain from a possible in grown toe nail. Pt states that it has been increasingly worse.  He reports he cannot trim his toenails very well on his own.  Patient reports toe is tender to touch.  Patient reports he can still wear his shoes.  Patient reports when he has the shoe off there is no pain or tenderness to his toe.     Past Medical History:   Diagnosis Date    CAD (coronary artery disease)     Enlarged prostate     Erectile dysfunction     Fainting spell     GERD (gastroesophageal reflux disease)     Hyperlipidemia     Hypertension     Peripheral neuropathy     Slow to wake up after anesthesia     Unstable angina       Family History   Problem Relation Age of Onset    Heart disease Mother     No Known Problems Father     Hepatitis Sister     Stroke Maternal Grandmother     Stroke Maternal Grandfather     Malig Hyperthermia Neg Hx       Past Surgical History:   Procedure Laterality Date    APPENDECTOMY      CARDIAC CATHETERIZATION N/A 3/6/2020    Procedure: Coronary angiography, Left heart catheterization, Left ventricular angiography;  Surgeon: Carlos Aparicio MD;  Location: [facility] CATH INVASIVE LOCATION;  Service: Cardiology;  Laterality: N/A;    CARDIAC SURGERY      COLONOSCOPY N/A 8/20/2021    Procedure: COLONOSCOPY WITH POLYPECTOMY HOT SNARE;  Surgeon: Devon Plata MD;  Location: [facility] ENDOSCOPY;  Service: General;  Laterality: N/A;  COLON POLYP    CORONARY ARTERY BYPASS GRAFT N/A 3/11/2020    Procedure: SHARRI, MEDIAN STERNOTOMY, CORONARY ARTERY BYPASS GRAFTING X 5   UTILIZING MARIEL INTERNAL MAMMARY ARTERIES AND ENDOSCOPICALLY HARVESTED LEFT SAPHENOUS VEIN, PRP;  Surgeon: Jr Manuel Orourke MD;  Location: [facility] OR;   "Service: Cardiothoracic;  Laterality: N/A;    MOUTH SURGERY          Current Outpatient Medications:     aspirin 81 MG EC tablet, Take 1 tablet by mouth Daily., Disp: 90 tablet, Rfl: 1    carvedilol (COREG) 6.25 MG tablet, Take 1 tablet by mouth 2 (Two) Times a Day., Disp: 180 tablet, Rfl: 0    ezetimibe (ZETIA) 10 MG tablet, Take 1 tablet by mouth Daily., Disp: 90 tablet, Rfl: 3    losartan (COZAAR) 100 MG tablet, Take 1 tablet by mouth Daily., Disp: 90 tablet, Rfl: 0    pantoprazole (PROTONIX) 20 MG EC tablet, Take 1 tablet by mouth Daily., Disp: 90 tablet, Rfl: 1    tamsulosin (FLOMAX) 0.4 MG capsule 24 hr capsule, TAKE 2 CAPSULES DAILY, Disp: 180 capsule, Rfl: 3    pravastatin (PRAVACHOL) 80 MG tablet, Take 1 tablet by mouth Daily. (Patient not taking: Reported on 10/25/2023), Disp: 90 tablet, Rfl: 1    OBJECTIVE  Vital Signs:   /89 (BP Location: Left arm, Patient Position: Sitting, Cuff Size: Large Adult)   Pulse 80   Ht 170.2 cm (67\")   Wt 95 kg (209 lb 6.4 oz)   SpO2 98%   BMI 32.80 kg/m²    Estimated body mass index is 32.8 kg/m² as calculated from the following:    Height as of this encounter: 170.2 cm (67\").    Weight as of this encounter: 95 kg (209 lb 6.4 oz).     Wt Readings from Last 3 Encounters:   11/01/23 95 kg (209 lb 6.4 oz)   10/25/23 96.2 kg (212 lb)   04/25/23 94.2 kg (207 lb 9.6 oz)     BP Readings from Last 3 Encounters:   11/01/23 159/89   10/25/23 132/76   04/25/23 138/62       Physical Exam  Vitals reviewed.   Constitutional:       General: He is not in acute distress.  HENT:      Head: Normocephalic and atraumatic.   Eyes:      Conjunctiva/sclera: Conjunctivae normal.   Cardiovascular:      Rate and Rhythm: Normal rate and regular rhythm.      Heart sounds: Normal heart sounds.   Pulmonary:      Effort: Pulmonary effort is normal.      Breath sounds: Normal breath sounds.   Feet:      Comments: Left great toe tender to touch, no redness, warmth, abscess, or drainage noted, " toenail cut very short on outer edge started to ingrow.   Skin:     General: Skin is warm and dry.   Neurological:      General: No focal deficit present.      Mental Status: He is alert and oriented to person, place, and time.   Psychiatric:         Mood and Affect: Mood normal.         Behavior: Behavior normal.         Thought Content: Thought content normal.         Judgment: Judgment normal.          Result Review    Common labs          1/14/2023    07:44 4/22/2023    11:19   Common Labs   Glucose 86  106    BUN 12  12    Creatinine 1.01  0.96    Sodium 138  137    Potassium 4.0  5.1    Chloride 104  101    Calcium 9.2  9.9    Albumin 4.3  4.4    Total Bilirubin 1.1  1.2    Alkaline Phosphatase 69  75    AST (SGOT) 33  27    ALT (SGPT) 35  30    WBC 8.17     Hemoglobin 15.0     Hematocrit 44.1     Platelets 306     Total Cholesterol 202  163    Triglycerides 194  187    HDL Cholesterol 34  37    LDL Cholesterol  133  94        No Images in the past 120 days found..      The above data has been reviewed by JAIME Bonilla 11/01/2023 09:27 EDT.          Patient Care Team:  Suellen Jimenez APRN as PCP - General (Nurse Practitioner)            ASSESSMENT & PLAN    Diagnoses and all orders for this visit:    1. Ingrown toenail of left foot (Primary)  Comments:  soak foot in warm/soapy water for 10- 20 minutes twice a day then apply triamcinolone cream for 2 weeks, referral to podiatry placed         Tobacco Use: High Risk (11/1/2023)    Patient History     Smoking Tobacco Use: Every Day     Smokeless Tobacco Use: Never     Passive Exposure: Not on file       Follow Up     Return if symptoms worsen or fail to improve.      Patient was given instructions and counseling regarding his condition or for health maintenance advice. Please see specific information pulled into the AVS if appropriate.   I have reviewed information obtained and documented by others and I have confirmed the accuracy of this documented  note.    Tanna Hughes, JAIME

## 2024-01-22 ENCOUNTER — TELEPHONE (OUTPATIENT)
Dept: FAMILY MEDICINE CLINIC | Facility: CLINIC | Age: 59
End: 2024-01-22
Payer: COMMERCIAL

## 2024-01-27 ENCOUNTER — LAB (OUTPATIENT)
Dept: LAB | Facility: HOSPITAL | Age: 59
End: 2024-01-27
Payer: COMMERCIAL

## 2024-01-27 DIAGNOSIS — E78.5 HYPERLIPIDEMIA LDL GOAL <70: ICD-10-CM

## 2024-01-27 DIAGNOSIS — I10 ESSENTIAL HYPERTENSION: ICD-10-CM

## 2024-01-27 DIAGNOSIS — R73.9 HYPERGLYCEMIA: ICD-10-CM

## 2024-01-27 DIAGNOSIS — Z12.5 SCREENING PSA (PROSTATE SPECIFIC ANTIGEN): ICD-10-CM

## 2024-01-27 DIAGNOSIS — Z13.29 THYROID DISORDER SCREEN: ICD-10-CM

## 2024-01-27 LAB
ALBUMIN SERPL-MCNC: 4.5 G/DL (ref 3.5–5.2)
ALBUMIN/GLOB SERPL: 1.9 G/DL
ALP SERPL-CCNC: 67 U/L (ref 39–117)
ALT SERPL W P-5'-P-CCNC: 34 U/L (ref 1–41)
ANION GAP SERPL CALCULATED.3IONS-SCNC: 11 MMOL/L (ref 5–15)
AST SERPL-CCNC: 25 U/L (ref 1–40)
BASOPHILS # BLD AUTO: 0.03 10*3/MM3 (ref 0–0.2)
BASOPHILS NFR BLD AUTO: 0.4 % (ref 0–1.5)
BILIRUB CONJ SERPL-MCNC: 0.2 MG/DL (ref 0–0.3)
BILIRUB SERPL-MCNC: 0.9 MG/DL (ref 0–1.2)
BUN SERPL-MCNC: 14 MG/DL (ref 8–23)
BUN/CREAT SERPL: 13.3 (ref 7–25)
CALCIUM SPEC-SCNC: 9.6 MG/DL (ref 8.6–10.5)
CHLORIDE SERPL-SCNC: 106 MMOL/L (ref 98–107)
CHOLEST SERPL-MCNC: 232 MG/DL (ref 0–200)
CO2 SERPL-SCNC: 26 MMOL/L (ref 22–29)
CREAT SERPL-MCNC: 1.05 MG/DL (ref 0.76–1.27)
DEPRECATED RDW RBC AUTO: 42 FL (ref 37–54)
EGFRCR SERPLBLD CKD-EPI 2021: 81.3 ML/MIN/1.73
EOSINOPHIL # BLD AUTO: 0.12 10*3/MM3 (ref 0–0.4)
EOSINOPHIL NFR BLD AUTO: 1.6 % (ref 0.3–6.2)
ERYTHROCYTE [DISTWIDTH] IN BLOOD BY AUTOMATED COUNT: 13.1 % (ref 12.3–15.4)
GLOBULIN UR ELPH-MCNC: 2.4 GM/DL
GLUCOSE SERPL-MCNC: 94 MG/DL (ref 65–99)
HBA1C MFR BLD: 5.8 % (ref 4.8–5.6)
HCT VFR BLD AUTO: 46.1 % (ref 37.5–51)
HDLC SERPL-MCNC: 31 MG/DL (ref 40–60)
HGB BLD-MCNC: 15 G/DL (ref 13–17.7)
IMM GRANULOCYTES # BLD AUTO: 0.02 10*3/MM3 (ref 0–0.05)
IMM GRANULOCYTES NFR BLD AUTO: 0.3 % (ref 0–0.5)
LDLC SERPL CALC-MCNC: 159 MG/DL (ref 0–100)
LDLC/HDLC SERPL: 5.05 {RATIO}
LYMPHOCYTES # BLD AUTO: 2.17 10*3/MM3 (ref 0.7–3.1)
LYMPHOCYTES NFR BLD AUTO: 28.2 % (ref 19.6–45.3)
MCH RBC QN AUTO: 28.2 PG (ref 26.6–33)
MCHC RBC AUTO-ENTMCNC: 32.5 G/DL (ref 31.5–35.7)
MCV RBC AUTO: 86.7 FL (ref 79–97)
MONOCYTES # BLD AUTO: 0.59 10*3/MM3 (ref 0.1–0.9)
MONOCYTES NFR BLD AUTO: 7.7 % (ref 5–12)
NEUTROPHILS NFR BLD AUTO: 4.77 10*3/MM3 (ref 1.7–7)
NEUTROPHILS NFR BLD AUTO: 61.8 % (ref 42.7–76)
NRBC BLD AUTO-RTO: 0 /100 WBC (ref 0–0.2)
PLATELET # BLD AUTO: 305 10*3/MM3 (ref 140–450)
PMV BLD AUTO: 9.6 FL (ref 6–12)
POTASSIUM SERPL-SCNC: 4.5 MMOL/L (ref 3.5–5.2)
PROT SERPL-MCNC: 6.9 G/DL (ref 6–8.5)
PSA SERPL-MCNC: 1.17 NG/ML (ref 0–4)
RBC # BLD AUTO: 5.32 10*6/MM3 (ref 4.14–5.8)
SODIUM SERPL-SCNC: 143 MMOL/L (ref 136–145)
TRIGL SERPL-MCNC: 223 MG/DL (ref 0–150)
TSH SERPL DL<=0.05 MIU/L-ACNC: 0.87 UIU/ML (ref 0.27–4.2)
VLDLC SERPL-MCNC: 42 MG/DL (ref 5–40)
WBC NRBC COR # BLD AUTO: 7.7 10*3/MM3 (ref 3.4–10.8)

## 2024-01-27 PROCEDURE — 36415 COLL VENOUS BLD VENIPUNCTURE: CPT

## 2024-01-27 PROCEDURE — G0103 PSA SCREENING: HCPCS

## 2024-01-27 PROCEDURE — 83036 HEMOGLOBIN GLYCOSYLATED A1C: CPT

## 2024-01-27 PROCEDURE — 82248 BILIRUBIN DIRECT: CPT

## 2024-01-27 PROCEDURE — 80050 GENERAL HEALTH PANEL: CPT

## 2024-01-27 PROCEDURE — 80061 LIPID PANEL: CPT

## 2024-01-30 ENCOUNTER — TELEPHONE (OUTPATIENT)
Dept: CARDIOLOGY | Facility: CLINIC | Age: 59
End: 2024-01-30
Payer: COMMERCIAL

## 2024-01-30 DIAGNOSIS — E78.5 HYPERLIPIDEMIA LDL GOAL <70: ICD-10-CM

## 2024-01-30 RX ORDER — EZETIMIBE 10 MG/1
10 TABLET ORAL DAILY
Qty: 90 TABLET | Refills: 3 | Status: SHIPPED | OUTPATIENT
Start: 2024-01-30

## 2024-01-30 RX ORDER — PRAVASTATIN SODIUM 80 MG/1
80 TABLET ORAL DAILY
Qty: 90 TABLET | Refills: 3 | Status: SHIPPED | OUTPATIENT
Start: 2024-01-30

## 2024-01-30 NOTE — TELEPHONE ENCOUNTER
Spoke with patient regarding his labs.  He states that he has been out of his pravastatin for about the last month and has not taken his medications. States that he likes his chilel and eggs and cooks with chilel grease.  We discussed the importance of taking his medications, lowering his cholesterol and changing his diet and how that plays a role in heart disease.  I sent in prescription for pravastatin and Zetia to [facility] at this time.  He has a follow-up appointment with Dr. Aparicio in March.  I encouraged him to make sure he got to his appointment.

## 2024-02-09 ENCOUNTER — OFFICE VISIT (OUTPATIENT)
Dept: UROLOGY | Facility: CLINIC | Age: 59
End: 2024-02-09
Payer: COMMERCIAL

## 2024-02-09 VITALS
BODY MASS INDEX: 33.27 KG/M2 | HEART RATE: 65 BPM | DIASTOLIC BLOOD PRESSURE: 97 MMHG | HEIGHT: 67 IN | WEIGHT: 212 LBS | SYSTOLIC BLOOD PRESSURE: 166 MMHG | RESPIRATION RATE: 14 BRPM

## 2024-02-09 DIAGNOSIS — N40.0 BENIGN PROSTATIC HYPERPLASIA, UNSPECIFIED WHETHER LOWER URINARY TRACT SYMPTOMS PRESENT: ICD-10-CM

## 2024-02-09 DIAGNOSIS — I10 ESSENTIAL HYPERTENSION: ICD-10-CM

## 2024-02-09 LAB
BILIRUB BLD-MCNC: NEGATIVE MG/DL
CLARITY, POC: CLEAR
COLOR UR: YELLOW
EXPIRATION DATE: NORMAL
GLUCOSE UR STRIP-MCNC: NEGATIVE MG/DL
KETONES UR QL: NEGATIVE
LEUKOCYTE EST, POC: NEGATIVE
Lab: NORMAL
NITRITE UR-MCNC: NEGATIVE MG/ML
PH UR: 7 [PH] (ref 5–8)
PROT UR STRIP-MCNC: NEGATIVE MG/DL
RBC # UR STRIP: NEGATIVE /UL
SP GR UR: 1.01 (ref 1–1.03)
URINE VOLUME: 72
UROBILINOGEN UR QL: NORMAL

## 2024-02-09 RX ORDER — TAMSULOSIN HYDROCHLORIDE 0.4 MG/1
2 CAPSULE ORAL DAILY
Qty: 180 CAPSULE | Refills: 4 | Status: SHIPPED | OUTPATIENT
Start: 2024-02-09

## 2024-02-09 RX ORDER — LOSARTAN POTASSIUM 100 MG/1
100 TABLET ORAL DAILY
Qty: 90 TABLET | Refills: 0 | Status: SHIPPED | OUTPATIENT
Start: 2024-02-09

## 2024-02-09 NOTE — PROGRESS NOTES
Chief Complaint: Benign Prostatic Hypertrophy    Subjective         Benign Prostatic Hypertrophy      Prosper Darling is a 58 y.o. male presents to [facility] UROLOGY to be seen for follow-up on tamsulosin.    The patient has been on tamsulosin 0.8 mg daily for over a year.    He ran out of the prescription and has been having more urgency frequency and decreased stream.    When he is on the tamsulosin.    He is with nocturia x 1     Stream is good.     He is here for refills.    PSA from 1/24 is well within normal limits.        Objective     Past Medical History:   Diagnosis Date    CAD (coronary artery disease)     Enlarged prostate     Erectile dysfunction     Fainting spell     GERD (gastroesophageal reflux disease)     Hyperlipidemia     Hypertension     Peripheral neuropathy     Slow to wake up after anesthesia     Unstable angina        Past Surgical History:   Procedure Laterality Date    APPENDECTOMY      CARDIAC CATHETERIZATION N/A 3/6/2020    Procedure: Coronary angiography, Left heart catheterization, Left ventricular angiography;  Surgeon: Carlos Aparicio MD;  Location: [facility] CATH INVASIVE LOCATION;  Service: Cardiology;  Laterality: N/A;    CARDIAC SURGERY      COLONOSCOPY N/A 8/20/2021    Procedure: COLONOSCOPY WITH POLYPECTOMY HOT SNARE;  Surgeon: Devon Plata MD;  Location: [facility] ENDOSCOPY;  Service: General;  Laterality: N/A;  COLON POLYP    CORONARY ARTERY BYPASS GRAFT N/A 3/11/2020    Procedure: SHARRI, MEDIAN STERNOTOMY, CORONARY ARTERY BYPASS GRAFTING X 5   UTILIZING MARIEL INTERNAL MAMMARY ARTERIES AND ENDOSCOPICALLY HARVESTED LEFT SAPHENOUS VEIN, PRP;  Surgeon: Jr Manuel Orourke MD;  Location: [facility] OR;  Service: Cardiothoracic;  Laterality: N/A;    MOUTH SURGERY           Current Outpatient Medications:     aspirin 81 MG EC tablet, Take 1 tablet by mouth Daily., Disp: 90 tablet, Rfl: 1    carvedilol (COREG) 6.25 MG tablet, Take 1 tablet by mouth 2 (Two)  "Times a Day., Disp: 180 tablet, Rfl: 0    ezetimibe (ZETIA) 10 MG tablet, Take 1 tablet by mouth Daily., Disp: 90 tablet, Rfl: 3    losartan (COZAAR) 100 MG tablet, Take 1 tablet by mouth Daily., Disp: 90 tablet, Rfl: 0    pantoprazole (PROTONIX) 20 MG EC tablet, Take 1 tablet by mouth Daily., Disp: 90 tablet, Rfl: 1    pravastatin (PRAVACHOL) 80 MG tablet, Take 1 tablet by mouth Daily., Disp: 90 tablet, Rfl: 3    tamsulosin (FLOMAX) 0.4 MG capsule 24 hr capsule, Take 2 capsules by mouth Daily., Disp: 180 capsule, Rfl: 4    Allergies   Allergen Reactions    Atorvastatin Myalgia    Rosuvastatin Myalgia        Family History   Problem Relation Age of Onset    Heart disease Mother     No Known Problems Father     Hepatitis Sister     Stroke Maternal Grandmother     Stroke Maternal Grandfather     Malig Hyperthermia Neg Hx        Social History     Socioeconomic History    Marital status:    Tobacco Use    Smoking status: Every Day     Packs/day: 1.00     Years: 46.00     Additional pack years: 0.00     Total pack years: 46.00     Types: Cigarettes     Start date: 1976    Smokeless tobacco: Never    Tobacco comments:     caffeine use, started back smoking 3/2019   Vaping Use    Vaping Use: Never used   Substance and Sexual Activity    Alcohol use: Yes     Alcohol/week: 0.0 - 1.0 standard drinks of alcohol     Comment: occas.    Drug use: Never    Sexual activity: Defer       Vital Signs:   /97   Pulse 65   Resp 14   Ht 170.2 cm (67\")   Wt 96.2 kg (212 lb)   BMI 33.20 kg/m²      Physical Exam     Result Review :   The following data was reviewed by: JAIME De La Cruz on 02/09/2024:  Results for orders placed or performed in visit on 02/09/24   Bladder Scan   Result Value Ref Range    Urine Volume 72    POC Urinalysis Dipstick, Automated    Specimen: Urine   Result Value Ref Range    Color Yellow Yellow, Straw, Dark Yellow, Ana M    Clarity, UA Clear Clear    Specific Gravity  1.010 1.005 - 1.030    " pH, Urine 7.0 5.0 - 8.0    Leukocytes Negative Negative    Nitrite, UA Negative Negative    Protein, POC Negative Negative mg/dL    Glucose, UA Negative Negative mg/dL    Ketones, UA Negative Negative    Urobilinogen, UA 0.2 E.U./dL Normal, 0.2 E.U./dL    Bilirubin Negative Negative    Blood, UA Negative Negative    Lot Number -     Expiration Date -        Bladder Scan interpretation 02/09/2024    Estimation of residual urine via BVI 3000 Verathon Bladder Scan  MA/nurse performing: francois jennings   Residual Urine: 72 ml  Indication: Benign prostatic hyperplasia, unspecified whether lower urinary tract symptoms present   Position: Supine  Examination: Incremental scanning of the suprapubic area using 2.0 MHz transducer using copious amounts of acoustic gel.   Findings: An anechoic area was demonstrated which represented the bladder, with measurement of residual urine as noted. I inspected this myself. In that the residual urine was stable or insignificant, refer to plan for treatment and plan necessary at this time.            PSA          1/27/2024    09:08   PSA   PSA 1.170           Procedures        Assessment and Plan    Diagnoses and all orders for this visit:    1. Benign prostatic hyperplasia, unspecified whether lower urinary tract symptoms present  -     tamsulosin (FLOMAX) 0.4 MG capsule 24 hr capsule; Take 2 capsules by mouth Daily.  Dispense: 180 capsule; Refill: 4  -     POC Urinalysis Dipstick, Automated  -     Bladder Scan      We will refill patient's medication and to follow-up with him in 6 months with repeat PVR.  Next  He will call the office with any new or worsening symptoms.        I spent 10 minutes caring for Prosper on this date of service. This time includes time spent by me in the following activities:reviewing tests, obtaining and/or reviewing a separately obtained history, performing a medically appropriate examination and/or evaluation , counseling and educating the  patient/family/caregiver, ordering medications, tests, or procedures, and documenting information in the medical record  Follow Up   Return in about 6 months (around 8/9/2024) for f/u BPH with PVR .  Patient was given instructions and counseling regarding his condition or for health maintenance advice. Please see specific information pulled into the AVS if appropriate.

## 2024-02-12 DIAGNOSIS — I10 ESSENTIAL HYPERTENSION: ICD-10-CM

## 2024-02-13 DIAGNOSIS — E78.5 HYPERLIPIDEMIA LDL GOAL <70: ICD-10-CM

## 2024-02-13 RX ORDER — EZETIMIBE 10 MG/1
10 TABLET ORAL DAILY
Qty: 90 TABLET | Refills: 3 | Status: SHIPPED | OUTPATIENT
Start: 2024-02-13

## 2024-02-13 RX ORDER — CARVEDILOL 6.25 MG/1
6.25 TABLET ORAL 2 TIMES DAILY
Qty: 180 TABLET | Refills: 0 | Status: SHIPPED | OUTPATIENT
Start: 2024-02-13

## 2024-02-28 ENCOUNTER — OFFICE VISIT (OUTPATIENT)
Dept: FAMILY MEDICINE CLINIC | Facility: CLINIC | Age: 59
End: 2024-02-28
Payer: COMMERCIAL

## 2024-02-28 ENCOUNTER — TELEPHONE (OUTPATIENT)
Dept: FAMILY MEDICINE CLINIC | Facility: CLINIC | Age: 59
End: 2024-02-28

## 2024-02-28 VITALS
SYSTOLIC BLOOD PRESSURE: 169 MMHG | OXYGEN SATURATION: 99 % | WEIGHT: 211 LBS | HEIGHT: 67 IN | HEART RATE: 76 BPM | BODY MASS INDEX: 33.12 KG/M2 | DIASTOLIC BLOOD PRESSURE: 87 MMHG

## 2024-02-28 DIAGNOSIS — H01.004 BLEPHARITIS OF LEFT UPPER EYELID, UNSPECIFIED TYPE: ICD-10-CM

## 2024-02-28 DIAGNOSIS — K21.9 GASTROESOPHAGEAL REFLUX DISEASE, UNSPECIFIED WHETHER ESOPHAGITIS PRESENT: ICD-10-CM

## 2024-02-28 DIAGNOSIS — I10 ESSENTIAL HYPERTENSION: Primary | ICD-10-CM

## 2024-02-28 PROCEDURE — 99214 OFFICE O/P EST MOD 30 MIN: CPT | Performed by: NURSE PRACTITIONER

## 2024-02-28 RX ORDER — HYDROCHLOROTHIAZIDE 12.5 MG/1
12.5 TABLET ORAL DAILY
Qty: 30 TABLET | Refills: 1 | Status: SHIPPED | OUTPATIENT
Start: 2024-02-28 | End: 2024-02-28 | Stop reason: SDUPTHER

## 2024-02-28 RX ORDER — HYDROCHLOROTHIAZIDE 12.5 MG/1
12.5 TABLET ORAL DAILY
Qty: 30 TABLET | Refills: 1 | Status: SHIPPED | OUTPATIENT
Start: 2024-02-28

## 2024-02-28 RX ORDER — ERYTHROMYCIN 5 MG/G
OINTMENT OPHTHALMIC NIGHTLY
Qty: 1 G | Refills: 0 | Status: SHIPPED | OUTPATIENT
Start: 2024-02-28 | End: 2024-03-06

## 2024-02-28 RX ORDER — PANTOPRAZOLE SODIUM 20 MG/1
20 TABLET, DELAYED RELEASE ORAL DAILY
Qty: 90 TABLET | Refills: 1 | Status: SHIPPED | OUTPATIENT
Start: 2024-02-28

## 2024-02-28 RX ORDER — HYDROCHLOROTHIAZIDE 12.5 MG/1
12.5 TABLET ORAL DAILY
Qty: 90 TABLET | OUTPATIENT
Start: 2024-02-28

## 2024-02-28 RX ORDER — DOXYCYCLINE HYCLATE 100 MG/1
100 CAPSULE ORAL 2 TIMES DAILY
Qty: 20 CAPSULE | Refills: 0 | Status: SHIPPED | OUTPATIENT
Start: 2024-02-28 | End: 2024-02-28 | Stop reason: SDUPTHER

## 2024-02-28 RX ORDER — DOXYCYCLINE HYCLATE 100 MG/1
100 CAPSULE ORAL 2 TIMES DAILY
Qty: 20 CAPSULE | Refills: 0 | Status: SHIPPED | OUTPATIENT
Start: 2024-02-28

## 2024-02-28 NOTE — TELEPHONE ENCOUNTER
PATIENT IS REQUESTING MEDICATIONS BE SENT TO [facility] ADDRESS  [address], [place] [address]. NEEDS THEM TODAY. MEDS ARE hydroCHLOROthiazide 12.5 MG tablet AND doxycycline (VIBRAMYCIN) 100 MG capsule

## 2024-02-28 NOTE — TELEPHONE ENCOUNTER
Suellen had already resent these at 10:20 to [facility] in [place]. Called to let the pt know, pt said it was ok and will  from [place]

## 2024-02-28 NOTE — TELEPHONE ENCOUNTER
Please call the pharmacy and let them know  -I just prescribed this -it is a new medication for the patient today and patient is following up with cardiology in 2 weeks, I am uncertain as to whether or not they will keep him on this medication at that dose, therefore 30-day prescription is all that is needed for the time being

## 2024-02-28 NOTE — PROGRESS NOTES
Chief Complaint  Eye irritation and swelling  SUBJECTIVE  Prosper Darling presents to [facility]     Patient presents today with complaints of left eyelid swelling and irritation and crusting.  Patient reports for the last couple of weeks the eyelid has been irritated, occasionally a bit itchy, states that for the last 2 days it has become swollen, and he has been having crusting along the upper lash line.  Eyes is not red, no pain, although the lid is tender to touch.  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        History of Present Illness  Past Medical History:   Diagnosis Date    CAD (coronary artery disease)     Enlarged prostate     Erectile dysfunction     Fainting spell     GERD (gastroesophageal reflux disease)     Hyperlipidemia     Hypertension     Peripheral neuropathy     Slow to wake up after anesthesia     Unstable angina       Family History   Problem Relation Age of Onset    Heart disease Mother     No Known Problems Father     Hepatitis Sister     Stroke Maternal Grandmother     Stroke Maternal Grandfather     Malig Hyperthermia Neg Hx       Past Surgical History:   Procedure Laterality Date    APPENDECTOMY      CARDIAC CATHETERIZATION N/A 3/6/2020    Procedure: Coronary angiography, Left heart catheterization, Left ventricular angiography;  Surgeon: Carlos Aparicio MD;  Location: [facility] CATH INVASIVE LOCATION;  Service: Cardiology;  Laterality: N/A;    CARDIAC SURGERY      COLONOSCOPY N/A 8/20/2021    Procedure: COLONOSCOPY WITH POLYPECTOMY HOT SNARE;  Surgeon: Devon Plata MD;  Location: [facility] ENDOSCOPY;  Service: General;  Laterality: N/A;  COLON POLYP    CORONARY ARTERY BYPASS GRAFT N/A 3/11/2020    Procedure: SHARRI, MEDIAN STERNOTOMY, CORONARY ARTERY BYPASS GRAFTING X 5   UTILIZING MARIEL INTERNAL MAMMARY ARTERIES AND  "ENDOSCOPICALLY HARVESTED LEFT SAPHENOUS VEIN, PRP;  Surgeon: Jr Manuel Orourke MD;  Location: [facility] OR;  Service: Cardiothoracic;  Laterality: N/A;    MOUTH SURGERY          Current Outpatient Medications:     doxycycline (VIBRAMYCIN) 100 MG capsule, Take 1 capsule by mouth 2 (Two) Times a Day., Disp: 20 capsule, Rfl: 0    hydroCHLOROthiazide 12.5 MG tablet, Take 1 tablet by mouth Daily., Disp: 30 tablet, Rfl: 1    pantoprazole (PROTONIX) 20 MG EC tablet, Take 1 tablet by mouth Daily., Disp: 90 tablet, Rfl: 1    aspirin 81 MG EC tablet, Take 1 tablet by mouth Daily., Disp: 90 tablet, Rfl: 1    carvedilol (COREG) 6.25 MG tablet, TAKE 1 TABLET TWICE A DAY, Disp: 180 tablet, Rfl: 0    erythromycin (ROMYCIN) 5 MG/GM ophthalmic ointment, Administer  into the left eye Every Night for 7 days., Disp: 1 g, Rfl: 0    ezetimibe (ZETIA) 10 MG tablet, Take 1 tablet by mouth Daily., Disp: 90 tablet, Rfl: 3    losartan (COZAAR) 100 MG tablet, TAKE 1 TABLET DAILY, Disp: 90 tablet, Rfl: 0    pravastatin (PRAVACHOL) 80 MG tablet, Take 1 tablet by mouth Daily., Disp: 90 tablet, Rfl: 3    tamsulosin (FLOMAX) 0.4 MG capsule 24 hr capsule, Take 2 capsules by mouth Daily., Disp: 180 capsule, Rfl: 4    OBJECTIVE  Vital Signs:   /87   Pulse 76   Ht 170.2 cm (67\")   Wt 95.7 kg (211 lb)   SpO2 99%   BMI 33.05 kg/m²    Estimated body mass index is 33.05 kg/m² as calculated from the following:    Height as of this encounter: 170.2 cm (67\").    Weight as of this encounter: 95.7 kg (211 lb).     Wt Readings from Last 3 Encounters:   02/28/24 95.7 kg (211 lb)   02/09/24 96.2 kg (212 lb)   11/01/23 95 kg (209 lb 6.4 oz)     BP Readings from Last 3 Encounters:   02/28/24 169/87   02/09/24 166/97   11/01/23 159/89       Physical Exam  Vitals reviewed.   Constitutional:       Appearance: Normal appearance. He is well-developed.   HENT:      Head: Normocephalic and atraumatic.      Right Ear: External ear normal.      Left Ear: " External ear normal.   Eyes:      Conjunctiva/sclera: Conjunctivae normal.      Right eye: Right conjunctiva is not injected. No exudate.     Left eye: Left conjunctiva is not injected. No exudate.     Pupils: Pupils are equal, round, and reactive to light.      Comments: Left upper lid is mildly edematous and mildly erythematous, some crusting along the lash line with a few tiny blisters noted   Cardiovascular:      Rate and Rhythm: Normal rate and regular rhythm.      Heart sounds: No murmur heard.     No friction rub. No gallop.   Pulmonary:      Effort: Pulmonary effort is normal.      Breath sounds: Normal breath sounds. No wheezing or rhonchi.   Skin:     General: Skin is warm and dry.   Neurological:      Mental Status: He is alert and oriented to person, place, and time.      Cranial Nerves: No cranial nerve deficit.   Psychiatric:         Mood and Affect: Mood and affect normal.         Behavior: Behavior normal.         Thought Content: Thought content normal.         Judgment: Judgment normal.          Result Review    CMP          4/22/2023    11:19 1/27/2024    09:08   CMP   Glucose 106  94    BUN 12  14    Creatinine 0.96  1.05    EGFR 90.5  81.3    Sodium 137  143    Potassium 5.1  4.5    Chloride 101  106    Calcium 9.9  9.6    Total Protein 7.8  6.9    Albumin 4.4  4.5    Globulin 3.4  2.4    Total Bilirubin 1.2  0.9    Alkaline Phosphatase 75  67    AST (SGOT) 27  25    ALT (SGPT) 30  34    Albumin/Globulin Ratio 1.3  1.9    BUN/Creatinine Ratio 12.5  13.3    Anion Gap 9.5  11.0      CBC          1/27/2024    09:08   CBC   WBC 7.70    RBC 5.32    Hemoglobin 15.0    Hematocrit 46.1    MCV 86.7    MCH 28.2    MCHC 32.5    RDW 13.1    Platelets 305      Lipid Panel          4/22/2023    11:19 1/27/2024    09:08   Lipid Panel   Total Cholesterol 163  232    Triglycerides 187  223    HDL Cholesterol 37  31    VLDL Cholesterol 32  42    LDL Cholesterol  94  159    LDL/HDL Ratio 2.39  5.05      TSH           1/27/2024    09:08   TSH   TSH 0.870      Most Recent A1C          1/27/2024    09:08   HGBA1C Most Recent   Hemoglobin A1C 5.80        No Images in the past 120 days found..     The above data has been reviewed by JAIME Gay 02/28/2024 09:58 EST.          Patient Care Team:  Suellen Jimenez APRN as PCP - General (Nurse Practitioner)            ASSESSMENT & PLAN    Diagnoses and all orders for this visit:    1. Essential hypertension (Primary)  Assessment & Plan:  Blood pressure noted to be elevated, patient reports he has been monitoring at home and it has been elevated there as well, manual recheck 154/80.After discussion we have decided to trial hydrochlorothiazide, side effects administration of medication discussed, we will follow-up in 1 month for reevaluation    Orders:  -     hydroCHLOROthiazide 12.5 MG tablet; Take 1 tablet by mouth Daily.  Dispense: 30 tablet; Refill: 1    2. Gastroesophageal reflux disease, unspecified whether esophagitis present  Overview:  Stable and well-controlled with Protonix, continue current dose of    Orders:  -     pantoprazole (PROTONIX) 20 MG EC tablet; Take 1 tablet by mouth Daily.  Dispense: 90 tablet; Refill: 1    3. Blepharitis of left upper eyelid, unspecified type  -     doxycycline (VIBRAMYCIN) 100 MG capsule; Take 1 capsule by mouth 2 (Two) Times a Day.  Dispense: 20 capsule; Refill: 0  -     erythromycin (ROMYCIN) 5 MG/GM ophthalmic ointment; Administer  into the left eye Every Night for 7 days.  Dispense: 1 g; Refill: 0    Other orders  -     Discontinue: doxycycline (VIBRAMYCIN) 100 MG capsule; Take 1 capsule by mouth 2 (Two) Times a Day.  Dispense: 20 capsule; Refill: 0  -     Discontinue: hydroCHLOROthiazide 12.5 MG tablet; Take 1 tablet by mouth Daily.  Dispense: 30 tablet; Refill: 1    Discussed with patient he needs to wash eyes twice daily with warm water and Jose baby shampoo, lather up on a wash rag and gently massage the eyelids  and lash line.  Discussed if any new or worsening symptoms, vision changes, pain, or any other concerning symptoms develop seek immediate reevaluation or no improvement within 48 hours follow-up for reevaluation    Tobacco Use: High Risk (2/28/2024)    Patient History     Smoking Tobacco Use: Every Day     Smokeless Tobacco Use: Never     Passive Exposure: Not on file       Follow Up     Return if symptoms worsen or fail to improve.        Patient was given instructions and counseling regarding his condition or for health maintenance advice. Please see specific information pulled into the AVS if appropriate.   I have reviewed information obtained and documented by others and I have confirmed the accuracy of this documented note.    JAIME Gay

## 2024-02-28 NOTE — ASSESSMENT & PLAN NOTE
Blood pressure noted to be elevated, patient reports he has been monitoring at home and it has been elevated there as well, manual recheck 154/80.After discussion we have decided to trial hydrochlorothiazide, side effects administration of medication discussed, we will follow-up in 1 month for reevaluation

## 2024-03-13 ENCOUNTER — OFFICE VISIT (OUTPATIENT)
Dept: CARDIOLOGY | Facility: CLINIC | Age: 59
End: 2024-03-13
Payer: COMMERCIAL

## 2024-03-13 VITALS
HEIGHT: 67 IN | HEART RATE: 73 BPM | DIASTOLIC BLOOD PRESSURE: 82 MMHG | SYSTOLIC BLOOD PRESSURE: 128 MMHG | WEIGHT: 209.6 LBS | BODY MASS INDEX: 32.9 KG/M2

## 2024-03-13 DIAGNOSIS — E78.5 HYPERLIPIDEMIA LDL GOAL <70: Primary | ICD-10-CM

## 2024-03-13 DIAGNOSIS — I10 ESSENTIAL HYPERTENSION: ICD-10-CM

## 2024-03-13 DIAGNOSIS — I25.118 CORONARY ARTERY DISEASE OF NATIVE ARTERY OF NATIVE HEART WITH STABLE ANGINA PECTORIS: ICD-10-CM

## 2024-03-13 DIAGNOSIS — Z72.0 TOBACCO ABUSE: ICD-10-CM

## 2024-03-13 DIAGNOSIS — Z95.1 S/P CABG X 5: ICD-10-CM

## 2024-03-13 PROCEDURE — 93000 ELECTROCARDIOGRAM COMPLETE: CPT | Performed by: NURSE PRACTITIONER

## 2024-03-13 PROCEDURE — 99214 OFFICE O/P EST MOD 30 MIN: CPT | Performed by: NURSE PRACTITIONER

## 2024-03-13 RX ORDER — EZETIMIBE 10 MG/1
10 TABLET ORAL DAILY
Qty: 90 TABLET | Refills: 3 | Status: SHIPPED | OUTPATIENT
Start: 2024-03-13

## 2024-03-13 RX ORDER — LOSARTAN POTASSIUM 100 MG/1
100 TABLET ORAL DAILY
Qty: 90 TABLET | Refills: 3 | Status: SHIPPED | OUTPATIENT
Start: 2024-03-13

## 2024-03-13 RX ORDER — PRAVASTATIN SODIUM 80 MG/1
80 TABLET ORAL DAILY
Qty: 90 TABLET | Refills: 3 | Status: SHIPPED | OUTPATIENT
Start: 2024-03-13

## 2024-03-13 NOTE — PROGRESS NOTES
Date of Office Visit: 2024  Encounter Provider: JAIME Ferrer  Place of Service: [facility] CARDIOLOGY  Patient Name: Prosper Darling  :1965    No chief complaint on file.  : Coronary artery disease    HPI: Prosper Darling is a 58 y.o. male who is a patient of  Dr. Aparicio.  He was last seen in the office on 2022 by myself.  He has a history of hypertension, hyperlipidemia, tobacco abuse and coronary artery disease.  In 2020, he presented with unstable angina.  Ultimately he underwent CABG x 5 (mid LAD, STACIE to first OM, SVG to acute marginal branch of right coronary, SVG to RCA, SVG to first diagonal).  Ventricular systolic function remain normal.  Been tried on atorvastatin and rosuvastatin, however, stopped due to myalgias.  Patient was then on pravastatin which was increased due to lipid panel out of target range    When I saw him in 2022, he had no complaints.  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  His blood pressure was elevated.  Patient was not monitoring his blood pressure very often at home.  Carvedilol was increased to 6.25 mg twice daily and patient was going to start monitoring his blood pressure on a normal basis.     Unfortunately, patient's wife  in 2023.  He admits to not taking care of himself very well after his wife .  Is now getting back on track is making quite a few changes.  He is taking his medications.  He no longer cooks with chilel grease but cooks with olive oil.  He is a lot more vegetables and salads.  Recent lipid profile 2024 shows total cholesterol 232, HDL 31,  and triglycerides 223.  Had been out of his pravastatin for a while was restarted taking his medication.    Denies chest pain, pressure, shortness of air and lightheadedness.  He is euvolemic on physical exam.    Previous testing and notes have been reviewed by me.   Past  Medical History:   Diagnosis Date    CAD (coronary artery disease)     Enlarged prostate     Erectile dysfunction     Fainting spell     GERD (gastroesophageal reflux disease)     Hyperlipidemia     Hypertension     Peripheral neuropathy     Slow to wake up after anesthesia     Unstable angina        Past Surgical History:   Procedure Laterality Date    APPENDECTOMY      CARDIAC CATHETERIZATION N/A 3/6/2020    Procedure: Coronary angiography, Left heart catheterization, Left ventricular angiography;  Surgeon: Carlos Aparicio MD;  Location: [facility] CATH INVASIVE LOCATION;  Service: Cardiology;  Laterality: N/A;    CARDIAC SURGERY      COLONOSCOPY N/A 8/20/2021    Procedure: COLONOSCOPY WITH POLYPECTOMY HOT SNARE;  Surgeon: Devon Plata MD;  Location: [facility] ENDOSCOPY;  Service: General;  Laterality: N/A;  COLON POLYP    CORONARY ARTERY BYPASS GRAFT N/A 3/11/2020    Procedure: SAHRRI, MEDIAN STERNOTOMY, CORONARY ARTERY BYPASS GRAFTING X 5   UTILIZING MARIEL INTERNAL MAMMARY ARTERIES AND ENDOSCOPICALLY HARVESTED LEFT SAPHENOUS VEIN, PRP;  Surgeon: Jr Manuel Orourke MD;  Location: [facility] OR;  Service: Cardiothoracic;  Laterality: N/A;    MOUTH SURGERY         Social History     Socioeconomic History    Marital status:    Tobacco Use    Smoking status: Every Day     Current packs/day: 1.00     Average packs/day: 1 pack/day for 48.2 years (48.2 ttl pk-yrs)     Types: Cigarettes     Start date: 1976    Smokeless tobacco: Never    Tobacco comments:     caffeine use, started back smoking 3/2019. Has tried gum, patches and medication and hypnosis.     Vaping Use    Vaping status: Never Used   Substance and Sexual Activity    Alcohol use: Yes     Alcohol/week: 0.0 - 1.0 standard drinks of alcohol     Comment: occas.    Drug use: Never    Sexual activity: Defer       Family History   Problem Relation Age of Onset    Heart disease Mother     No Known Problems Father     Hepatitis Sister     Stroke Maternal  "Grandmother     Stroke Maternal Grandfather     Malig Hyperthermia Neg Hx        Review of Systems   Constitutional: Negative.   HENT: Negative.     Eyes: Negative.    Cardiovascular: Negative.    Respiratory: Negative.     Endocrine: Negative.    Hematologic/Lymphatic: Negative.    Skin: Negative.    Musculoskeletal: Negative.    Gastrointestinal: Negative.    Genitourinary: Negative.    Neurological: Negative.    Psychiatric/Behavioral: Negative.     Allergic/Immunologic: Negative.        Allergies   Allergen Reactions    Atorvastatin Myalgia    Rosuvastatin Myalgia         Current Outpatient Medications:     aspirin 81 MG EC tablet, Take 1 tablet by mouth Daily., Disp: 90 tablet, Rfl: 1    carvedilol (COREG) 6.25 MG tablet, TAKE 1 TABLET TWICE A DAY, Disp: 180 tablet, Rfl: 0    ezetimibe (ZETIA) 10 MG tablet, Take 1 tablet by mouth Daily., Disp: 90 tablet, Rfl: 3    hydroCHLOROthiazide 12.5 MG tablet, Take 1 tablet by mouth Daily., Disp: 30 tablet, Rfl: 1    losartan (COZAAR) 100 MG tablet, Take 1 tablet by mouth Daily., Disp: 90 tablet, Rfl: 3    pantoprazole (PROTONIX) 20 MG EC tablet, Take 1 tablet by mouth Daily., Disp: 90 tablet, Rfl: 1    pravastatin (PRAVACHOL) 80 MG tablet, Take 1 tablet by mouth Daily., Disp: 90 tablet, Rfl: 3    tamsulosin (FLOMAX) 0.4 MG capsule 24 hr capsule, Take 2 capsules by mouth Daily., Disp: 180 capsule, Rfl: 4    doxycycline (VIBRAMYCIN) 100 MG capsule, Take 1 capsule by mouth 2 (Two) Times a Day. (Patient not taking: Reported on 3/13/2024), Disp: 20 capsule, Rfl: 0      Objective:     Vitals:    03/13/24 1138   BP: 128/82   Pulse: 73   Weight: 95.1 kg (209 lb 9.6 oz)   Height: 170.2 cm (67.01\")     Body mass index is 32.82 kg/m².    Left Heart cath 3/6/2020:  1. Left main: Normal  2. LAD: Chronic total occlusion mid segment.  Small caliber diagonal branch with 80 to 90% proximal stenosis.  3. LCX: Discrete 80% proximal stenosis.  Discrete 70% proximal OM1 stenosis.  4. RCA: 60 " to 70% proximal stenosis and discrete 70% distal stenosis  5.  Normal left ventricular size and systolic function     Recommendations: Evaluation for CABG     PHYSICAL EXAM:    Constitutional:       Appearance: Healthy appearance. Not in distress.   Neck:      Vascular: No JVR. JVD normal.   Pulmonary:      Effort: Pulmonary effort is normal.      Breath sounds: Normal breath sounds. No wheezing. No rhonchi. No rales.   Chest:      Chest wall: Not tender to palpatation.   Cardiovascular:      PMI at left midclavicular line. Normal rate. Regular rhythm. Normal S1. Normal S2.       Murmurs: There is no murmur.      No gallop.  No click. No rub.   Pulses:     Intact distal pulses.   Edema:     Peripheral edema absent.   Abdominal:      General: Bowel sounds are normal.      Palpations: Abdomen is soft.      Tenderness: There is no abdominal tenderness.   Musculoskeletal: Normal range of motion.         General: No tenderness. Skin:     General: Skin is warm and dry.   Neurological:      General: No focal deficit present.      Mental Status: Alert and oriented to person, place and time.           ECG 12 Lead    Date/Time: 3/13/2024 1:48 PM  Performed by: Catarina Blackburn APRN    Authorized by: Catarina Blackburn APRN  Comparison: compared with previous ECG from 12/27/2022  Similar to previous ECG  Rhythm: sinus rhythm  Rate: normal  BPM: 73  T inversion: V5            Assessment:       Diagnosis Plan   1. Hyperlipidemia LDL goal <70  Lipid Panel    ezetimibe (ZETIA) 10 MG tablet    pravastatin (PRAVACHOL) 80 MG tablet    ECG 12 Lead      2. Essential hypertension  losartan (COZAAR) 100 MG tablet    ECG 12 Lead    He will continue on carvedilol 3.125 mg twice daily and losartan 100 mg daily.      3. Coronary artery disease of native artery of native heart with stable angina pectoris  ECG 12 Lead      4. S/P CABG x 5  ECG 12 Lead      5. Tobacco abuse          Orders Placed This Encounter   Procedures    Lipid Panel      Standing Status:   Future     Standing Expiration Date:   3/13/2025     Order Specific Question:   Release to patient     Answer:   Routine Release [[id number]]    ECG 12 Lead     This order was created via procedure documentation     Order Specific Question:   Release to patient     Answer:   Routine Release [[id number]]          Plan:        Coronary artery disease: (LIMA to LAD, STACIE to first OM, SVG to acute marginal branch of the right coronary, SVG to RCA, SVG to first diagonal) 03/2020.  Maintains normal LV systolic function     2.   Hypertension: Hold at this office visit.  Patient does not know whether he is taking carvedilol 6.25 mg daily or 3.125 mg twice daily.  When he gets home, he will call and let me know.  I will refill accordingly.  He was also started on HCTZ 12.5 mg daily by his PCP and has had about a weeks worth of the medication by this time.    3.  Hyperlipidemia goal LDL less than 70: Lipid panel in January out of target range.  Patient had not been on his pravastatin for about a month.  Patient is now taking his pravastatin is changed his dietary habits.  Placed for lipid panel in May or June.    4.  Tobacco abuse: Smoking cessation recommended    Mr. Darling will follow-up with Dr. Aparicio 6 months.  He will call sooner for any questions or concerns.         Your medication list            Accurate as of March 13, 2024  1:49 PM. If you have any questions, ask your nurse or doctor.                CONTINUE taking these medications        Instructions Last Dose Given Next Dose Due   aspirin 81 MG EC tablet      Take 1 tablet by mouth Daily.       carvedilol 6.25 MG tablet  Commonly known as: COREG      TAKE 1 TABLET TWICE A DAY       doxycycline 100 MG capsule  Commonly known as: VIBRAMYCIN      Take 1 capsule by mouth 2 (Two) Times a Day.       ezetimibe 10 MG tablet  Commonly known as: ZETIA      Take 1 tablet by mouth Daily.       hydroCHLOROthiazide 12.5 MG tablet      Take 1 tablet by  mouth Daily.       losartan 100 MG tablet  Commonly known as: COZAAR      Take 1 tablet by mouth Daily.       pantoprazole 20 MG EC tablet  Commonly known as: PROTONIX      Take 1 tablet by mouth Daily.       pravastatin 80 MG tablet  Commonly known as: PRAVACHOL      Take 1 tablet by mouth Daily.       tamsulosin 0.4 MG capsule 24 hr capsule  Commonly known as: FLOMAX      Take 2 capsules by mouth Daily.                 Where to Get Your Medications        These medications were sent to [facility] - [place], [place] - [address] [place] [address] - [phone] [facility] [phone] [address] [address]      Phone: [phone]   ezetimibe 10 MG tablet  losartan 100 MG tablet  pravastatin 80 MG tablet           As always, it has been a pleasure to participate in your patient's care.      Sincerely,       JAIME Oglesby

## 2024-03-14 ENCOUNTER — TELEPHONE (OUTPATIENT)
Dept: CARDIOLOGY | Facility: CLINIC | Age: 59
End: 2024-03-14
Payer: COMMERCIAL

## 2024-03-14 DIAGNOSIS — I10 ESSENTIAL HYPERTENSION: ICD-10-CM

## 2024-03-14 RX ORDER — CARVEDILOL 6.25 MG/1
6.25 TABLET ORAL 2 TIMES DAILY
Qty: 180 TABLET | Refills: 3 | Status: SHIPPED | OUTPATIENT
Start: 2024-03-14

## 2024-03-14 NOTE — TELEPHONE ENCOUNTER
Caller: Prosper Darling    Relationship: Self    Best call back number: [phone]    What is the best time to reach you: ANY    Who are you requesting to speak with (clinical staff, provider,  specific staff member): CLAIR PANG    Do you know the name of the person who called:    What was the call regarding: PATIENT IS CALLING TO SAY THAT HE IS ON THE 6.25 COREG    Is it okay if the provider responds through MyChart:

## 2024-03-15 NOTE — TELEPHONE ENCOUNTER
I called pt let him know [facility] to call if he has any issues receiving it.   Ml

## 2024-04-30 ENCOUNTER — OFFICE VISIT (OUTPATIENT)
Dept: FAMILY MEDICINE CLINIC | Facility: CLINIC | Age: 59
End: 2024-04-30
Payer: COMMERCIAL

## 2024-04-30 VITALS
HEART RATE: 93 BPM | SYSTOLIC BLOOD PRESSURE: 150 MMHG | BODY MASS INDEX: 32.75 KG/M2 | OXYGEN SATURATION: 95 % | TEMPERATURE: 97.1 F | WEIGHT: 208.7 LBS | HEIGHT: 67 IN | DIASTOLIC BLOOD PRESSURE: 90 MMHG

## 2024-04-30 DIAGNOSIS — R73.03 PREDIABETES: ICD-10-CM

## 2024-04-30 DIAGNOSIS — Z23 NEED FOR TDAP VACCINATION: ICD-10-CM

## 2024-04-30 DIAGNOSIS — R53.83 FATIGUE, UNSPECIFIED TYPE: ICD-10-CM

## 2024-04-30 DIAGNOSIS — K21.9 GASTROESOPHAGEAL REFLUX DISEASE, UNSPECIFIED WHETHER ESOPHAGITIS PRESENT: ICD-10-CM

## 2024-04-30 DIAGNOSIS — I10 PRIMARY HYPERTENSION: ICD-10-CM

## 2024-04-30 DIAGNOSIS — G47.00 INSOMNIA, UNSPECIFIED TYPE: ICD-10-CM

## 2024-04-30 DIAGNOSIS — Z76.89 ENCOUNTER TO ESTABLISH CARE: Primary | ICD-10-CM

## 2024-04-30 DIAGNOSIS — F41.9 ANXIETY AND DEPRESSION: ICD-10-CM

## 2024-04-30 DIAGNOSIS — I51.9 HEART DISEASE: ICD-10-CM

## 2024-04-30 DIAGNOSIS — E78.5 HYPERLIPIDEMIA LDL GOAL <70: ICD-10-CM

## 2024-04-30 DIAGNOSIS — F32.A ANXIETY AND DEPRESSION: ICD-10-CM

## 2024-04-30 LAB
ALBUMIN SERPL-MCNC: 4.3 G/DL (ref 3.5–5.2)
ALBUMIN/GLOB SERPL: 1.4 G/DL
ALP SERPL-CCNC: 67 U/L (ref 39–117)
ALT SERPL W P-5'-P-CCNC: 23 U/L (ref 1–41)
ANION GAP SERPL CALCULATED.3IONS-SCNC: 9.3 MMOL/L (ref 5–15)
AST SERPL-CCNC: 22 U/L (ref 1–40)
BASOPHILS # BLD AUTO: 0.04 10*3/MM3 (ref 0–0.2)
BASOPHILS NFR BLD AUTO: 0.5 % (ref 0–1.5)
BILIRUB SERPL-MCNC: 0.9 MG/DL (ref 0–1.2)
BUN SERPL-MCNC: 11 MG/DL (ref 8–23)
BUN/CREAT SERPL: 10.7 (ref 7–25)
CALCIUM SPEC-SCNC: 9.6 MG/DL (ref 8.6–10.5)
CHLORIDE SERPL-SCNC: 104 MMOL/L (ref 98–107)
CO2 SERPL-SCNC: 26.7 MMOL/L (ref 22–29)
CREAT SERPL-MCNC: 1.03 MG/DL (ref 0.76–1.27)
DEPRECATED RDW RBC AUTO: 42.5 FL (ref 37–54)
EGFRCR SERPLBLD CKD-EPI 2021: 82.6 ML/MIN/1.73
EOSINOPHIL # BLD AUTO: 0.15 10*3/MM3 (ref 0–0.4)
EOSINOPHIL NFR BLD AUTO: 1.8 % (ref 0.3–6.2)
ERYTHROCYTE [DISTWIDTH] IN BLOOD BY AUTOMATED COUNT: 13 % (ref 12.3–15.4)
FOLATE SERPL-MCNC: 5.6 NG/ML (ref 4.78–24.2)
GLOBULIN UR ELPH-MCNC: 3 GM/DL
GLUCOSE SERPL-MCNC: 82 MG/DL (ref 65–99)
HBA1C MFR BLD: 5.6 % (ref 4.8–5.6)
HCT VFR BLD AUTO: 45.5 % (ref 37.5–51)
HGB BLD-MCNC: 15.2 G/DL (ref 13–17.7)
IMM GRANULOCYTES # BLD AUTO: 0.02 10*3/MM3 (ref 0–0.05)
IMM GRANULOCYTES NFR BLD AUTO: 0.2 % (ref 0–0.5)
LYMPHOCYTES # BLD AUTO: 2.86 10*3/MM3 (ref 0.7–3.1)
LYMPHOCYTES NFR BLD AUTO: 34.3 % (ref 19.6–45.3)
MCH RBC QN AUTO: 29.5 PG (ref 26.6–33)
MCHC RBC AUTO-ENTMCNC: 33.4 G/DL (ref 31.5–35.7)
MCV RBC AUTO: 88.2 FL (ref 79–97)
MONOCYTES # BLD AUTO: 0.68 10*3/MM3 (ref 0.1–0.9)
MONOCYTES NFR BLD AUTO: 8.2 % (ref 5–12)
NEUTROPHILS NFR BLD AUTO: 4.59 10*3/MM3 (ref 1.7–7)
NEUTROPHILS NFR BLD AUTO: 55 % (ref 42.7–76)
NRBC BLD AUTO-RTO: 0 /100 WBC (ref 0–0.2)
NT-PROBNP SERPL-MCNC: 46.3 PG/ML (ref 0–900)
PLATELET # BLD AUTO: 310 10*3/MM3 (ref 140–450)
PMV BLD AUTO: 9.8 FL (ref 6–12)
POTASSIUM SERPL-SCNC: 3.9 MMOL/L (ref 3.5–5.2)
PROT SERPL-MCNC: 7.3 G/DL (ref 6–8.5)
RBC # BLD AUTO: 5.16 10*6/MM3 (ref 4.14–5.8)
SODIUM SERPL-SCNC: 140 MMOL/L (ref 136–145)
T4 FREE SERPL-MCNC: 0.95 NG/DL (ref 0.93–1.7)
TSH SERPL DL<=0.05 MIU/L-ACNC: 1.88 UIU/ML (ref 0.27–4.2)
VIT B12 BLD-MCNC: 311 PG/ML (ref 211–946)
WBC NRBC COR # BLD AUTO: 8.34 10*3/MM3 (ref 3.4–10.8)

## 2024-04-30 PROCEDURE — 82607 VITAMIN B-12: CPT | Performed by: FAMILY MEDICINE

## 2024-04-30 PROCEDURE — 83036 HEMOGLOBIN GLYCOSYLATED A1C: CPT | Performed by: FAMILY MEDICINE

## 2024-04-30 PROCEDURE — 84439 ASSAY OF FREE THYROXINE: CPT | Performed by: FAMILY MEDICINE

## 2024-04-30 PROCEDURE — 83880 ASSAY OF NATRIURETIC PEPTIDE: CPT | Performed by: FAMILY MEDICINE

## 2024-04-30 PROCEDURE — 82746 ASSAY OF FOLIC ACID SERUM: CPT | Performed by: FAMILY MEDICINE

## 2024-04-30 PROCEDURE — 86038 ANTINUCLEAR ANTIBODIES: CPT | Performed by: FAMILY MEDICINE

## 2024-04-30 PROCEDURE — 80050 GENERAL HEALTH PANEL: CPT | Performed by: FAMILY MEDICINE

## 2024-04-30 RX ORDER — TRAZODONE HYDROCHLORIDE 50 MG/1
50 TABLET ORAL NIGHTLY
Qty: 30 TABLET | Refills: 1 | Status: SHIPPED | OUTPATIENT
Start: 2024-04-30

## 2024-04-30 RX ORDER — HYDRALAZINE HYDROCHLORIDE 10 MG/1
10 TABLET, FILM COATED ORAL 3 TIMES DAILY
Qty: 270 TABLET | Refills: 1 | Status: SHIPPED | OUTPATIENT
Start: 2024-04-30

## 2024-04-30 NOTE — PROGRESS NOTES
Chief Complaint  Establish Care, Hypertension, Hyperlipidemia, Joint Pain, Fatigue (severe), and Depression (Lost wife 2-9-23 in MVA )    Subjective          Prosper Darling presents to [facility]  History of Present Illness  Need to get established    Pt has depression- no SI or HI- pt refuses any meds or psychiatry currently- he is going to get counseling through his work and then decide if he still needs help  Hypertension  This is a chronic problem. The current episode started more than 1 year ago. The problem is unchanged. The problem is uncontrolled. Pertinent negatives include no anxiety, blurred vision, chest pain, headaches, orthopnea, palpitations, peripheral edema, PND, shortness of breath or sweats. There are no associated agents to hypertension. Risk factors for coronary artery disease include dyslipidemia, family history and male gender. Current antihypertension treatment includes angiotensin blockers and beta blockers. The current treatment provides no improvement. There are no compliance problems.               Objective   Allergies   Allergen Reactions    Atorvastatin Myalgia    Rosuvastatin Myalgia     There is no immunization history for the selected administration types on file for this patient.  Past Medical History:   Diagnosis Date    CAD (coronary artery disease)     Enlarged prostate     Erectile dysfunction     Fainting spell     GERD (gastroesophageal reflux disease)     Hyperlipidemia     Hypertension     Peripheral neuropathy     Slow to wake up after anesthesia     Unstable angina       Past Surgical History:   Procedure Laterality Date    APPENDECTOMY      CARDIAC CATHETERIZATION N/A 3/6/2020    Procedure: Coronary angiography, Left heart catheterization, Left ventricular angiography;  Surgeon: Carlos Aparicio MD;  Location:  INVASIVE LOCATION;  Service: Cardiology;  Laterality: N/A;    CARDIAC SURGERY      COLONOSCOPY N/A 8/20/2021     Procedure: COLONOSCOPY WITH POLYPECTOMY HOT SNARE;  Surgeon: Devon Plata MD;  [facility]:  [facility];  Service: General;  Laterality: N/A;  COLON POLYP    CORONARY ARTERY BYPASS GRAFT N/A 3/11/2020    Procedure: SHARRI, MEDIAN STERNOTOMY, CORONARY ARTERY BYPASS GRAFTING X 5   UTILIZING MARIEL INTERNAL MAMMARY ARTERIES AND ENDOSCOPICALLY HARVESTED LEFT SAPHENOUS VEIN, PRP;  Surgeon: Jr Manuel Orourke MD;  Location: [facility] MAIN OR;  Service: Cardiothoracic;  Laterality: N/A;    MOUTH SURGERY        Social History     Socioeconomic History    Marital status:    Tobacco Use    Smoking status: Every Day     Current packs/day: 1.00     Average packs/day: 1 pack/day for 48.3 years (48.3 ttl pk-yrs)     Types: Cigarettes     Start date: 1976    Smokeless tobacco: Never   Vaping Use    Vaping status: Never Used   Substance and Sexual Activity    Alcohol use: Yes     Alcohol/week: 0.0 - 1.0 standard drinks of alcohol     Comment: occas.    Drug use: Never    Sexual activity: Defer        Current Outpatient Medications:     aspirin 81 MG EC tablet, Take 1 tablet by mouth Daily., Disp: 90 tablet, Rfl: 1    carvedilol (COREG) 6.25 MG tablet, Take 1 tablet by mouth 2 (Two) Times a Day., Disp: 180 tablet, Rfl: 3    ezetimibe (ZETIA) 10 MG tablet, Take 1 tablet by mouth Daily., Disp: 90 tablet, Rfl: 3    losartan (COZAAR) 100 MG tablet, Take 1 tablet by mouth Daily., Disp: 90 tablet, Rfl: 3    pantoprazole (PROTONIX) 20 MG EC tablet, Take 1 tablet by mouth Daily., Disp: 90 tablet, Rfl: 1    pravastatin (PRAVACHOL) 80 MG tablet, Take 1 tablet by mouth Daily., Disp: 90 tablet, Rfl: 3    tamsulosin (FLOMAX) 0.4 MG capsule 24 hr capsule, Take 2 capsules by mouth Daily., Disp: 180 capsule, Rfl: 4    hydrALAZINE (APRESOLINE) 10 MG tablet, Take 1 tablet by mouth 3 (Three) Times a Day., Disp: 270 tablet, Rfl: 1    traZODone (DESYREL) 50 MG tablet, Take 1 tablet by mouth Every Night., Disp: 30 tablet, Rfl: 1   Family History  "  Problem Relation Age of Onset    Heart disease Mother     No Known Problems Father     Hepatitis Sister     Stroke Maternal Grandmother     Stroke Maternal Grandfather     Malig Hyperthermia Neg Hx           Vital Signs:   Vitals:    04/30/24 1416   BP: 150/90   Pulse: 93   Temp: 97.1 °F (36.2 °C)   SpO2: 95%   Weight: 94.7 kg (208 lb 11.2 oz)   Height: 170.2 cm (67\")       Review of Systems   Constitutional:  Positive for fatigue. Negative for fever.   HENT:  Negative for sore throat.    Eyes:  Negative for blurred vision and visual disturbance.   Respiratory:  Negative for cough, chest tightness, shortness of breath and wheezing.    Cardiovascular:  Negative for chest pain, palpitations, orthopnea, leg swelling and PND.   Gastrointestinal:  Negative for abdominal pain, diarrhea, nausea and vomiting.   Neurological:  Negative for dizziness, light-headedness and headaches.   Psychiatric/Behavioral:  Positive for dysphoric mood. Negative for sleep disturbance and suicidal ideas. The patient is not nervous/anxious.       Physical Exam  Vitals reviewed.   Constitutional:       Appearance: Normal appearance. He is well-developed.   HENT:      Head: Normocephalic and atraumatic.      Right Ear: External ear normal.      Left Ear: External ear normal.      Mouth/Throat:      Pharynx: No oropharyngeal exudate.   Eyes:      Conjunctiva/sclera: Conjunctivae normal.      Pupils: Pupils are equal, round, and reactive to light.   Cardiovascular:      Rate and Rhythm: Normal rate and regular rhythm.      Pulses: Normal pulses.      Heart sounds: Normal heart sounds. No murmur heard.     No friction rub. No gallop.   Pulmonary:      Effort: Pulmonary effort is normal.      Breath sounds: Normal breath sounds. No wheezing or rhonchi.   Abdominal:      General: Abdomen is flat. Bowel sounds are normal. There is no distension.      Palpations: Abdomen is soft. There is no mass.      Tenderness: There is no abdominal tenderness. " There is no guarding or rebound.      Hernia: No hernia is present.   Musculoskeletal:         General: Normal range of motion.   Skin:     General: Skin is warm and dry.      Capillary Refill: Capillary refill takes less than 2 seconds.   Neurological:      General: No focal deficit present.      Mental Status: He is alert and oriented to person, place, and time.      Cranial Nerves: No cranial nerve deficit.   Psychiatric:         Mood and Affect: Mood and affect normal.         Behavior: Behavior normal.         Thought Content: Thought content normal.         Judgment: Judgment normal.        Result Review :   The following data was reviewed by: Stuart Alva MD on 04/30/2024:  CMP          1/27/2024    09:08   CMP   Glucose 94    BUN 14    Creatinine 1.05    EGFR 81.3    Sodium 143    Potassium 4.5    Chloride 106    Calcium 9.6    Total Protein 6.9    Albumin 4.5    Globulin 2.4    Total Bilirubin 0.9    Alkaline Phosphatase 67    AST (SGOT) 25    ALT (SGPT) 34    Albumin/Globulin Ratio 1.9    BUN/Creatinine Ratio 13.3    Anion Gap 11.0      CBC          1/27/2024    09:08   CBC   WBC 7.70    RBC 5.32    Hemoglobin 15.0    Hematocrit 46.1    MCV 86.7    MCH 28.2    MCHC 32.5    RDW 13.1    Platelets 305      Lipid Panel          1/27/2024    09:08   Lipid Panel   Total Cholesterol 232    Triglycerides 223    HDL Cholesterol 31    VLDL Cholesterol 42    LDL Cholesterol  159    LDL/HDL Ratio 5.05      TSH          1/27/2024    09:08   TSH   TSH 0.870      Most Recent A1C          1/27/2024    09:08   HGBA1C Most Recent   Hemoglobin A1C 5.80                Assessment and Plan    Diagnoses and all orders for this visit:    1. Encounter to establish care (Primary)    2. Gastroesophageal reflux disease, unspecified whether esophagitis present  -     Ambulatory Referral to Gastroenterology    3. Need for Tdap vaccination  -     Tdap Vaccine => 8yo IM (BOOSTRIX)    4. Primary hypertension  -     hydrALAZINE  (APRESOLINE) 10 MG tablet; Take 1 tablet by mouth 3 (Three) Times a Day.  Dispense: 270 tablet; Refill: 1  -     Comprehensive Metabolic Panel  -     BNP    5. Fatigue, unspecified type  -     CBC Auto Differential  -     Comprehensive Metabolic Panel  -     TSH+Free T4  -     Vitamin B12 & Folate  -     DAPHNEY With / DsDNA, RNP, Sjogrens A / B, Alva  -     BNP    6. Heart disease    7. Hyperlipidemia LDL goal <70    8. Prediabetes  -     Hemoglobin A1c    9. Anxiety and depression    10. Insomnia, unspecified type  -     traZODone (DESYREL) 50 MG tablet; Take 1 tablet by mouth Every Night.  Dispense: 30 tablet; Refill: 1            Follow Up   Return in about 3 weeks (around 5/21/2024) for Recheck.  Patient was given instructions and counseling regarding his condition or for health maintenance advice. Please see specific information pulled into the AVS if appropriate.

## 2024-04-30 NOTE — PROGRESS NOTES
Venipuncture Blood Specimen Collection  Venipuncture performed in left arm  by Angelina Wilson with good hemostasis. Patient tolerated the procedure well without complications.   04/30/24   Angelina Wilson

## 2024-05-01 ENCOUNTER — PATIENT ROUNDING (BHMG ONLY) (OUTPATIENT)
Dept: FAMILY MEDICINE CLINIC | Facility: CLINIC | Age: 59
End: 2024-05-01
Payer: COMMERCIAL

## 2024-05-01 ENCOUNTER — TELEPHONE (OUTPATIENT)
Dept: GASTROENTEROLOGY | Facility: CLINIC | Age: 59
End: 2024-05-01
Payer: COMMERCIAL

## 2024-05-01 LAB — ANA SER QL: NEGATIVE

## 2024-05-01 NOTE — TELEPHONE ENCOUNTER
Left voice message for patient to return call in reference to a referral we received from Stuart Alva for Gastroesophageal reflux disease, unspecified whether esophagitis present. Patient needs a DA phone screening appointment.

## 2024-05-01 NOTE — TELEPHONE ENCOUNTER
Patient contacted the office back, about referral for GERD we had received from PCM Dr Alva. Patient has been scheduled for DA nurse call on 05/15/2024 at 10:00 am.  Patient stated that he has been taking protonixs for years and provider is wanting testing to figure out while still having to take it.

## 2024-05-01 NOTE — PROGRESS NOTES
My name is Talon Lord      I am  with [facility]  [facility]  [address] [place] [address] [address] [address] [phone].    I am calling to officially welcome you to our practice and ask about your recent visit.    Tell me about your visit with us. What things went well?       We're always looking for ways to make our patients' experiences even better. Do you have recommendations on ways we may improve?      Overall were you satisfied with your first visit to our practice?        I appreciate you taking the time to speak with me today. Is there anything else I can do for you?     Thank you, and have a great day.

## 2024-05-15 ENCOUNTER — PREP FOR SURGERY (OUTPATIENT)
Dept: OTHER | Facility: HOSPITAL | Age: 59
End: 2024-05-15
Payer: COMMERCIAL

## 2024-05-15 ENCOUNTER — CLINICAL SUPPORT (OUTPATIENT)
Dept: GASTROENTEROLOGY | Facility: CLINIC | Age: 59
End: 2024-05-15
Payer: COMMERCIAL

## 2024-05-15 DIAGNOSIS — K21.9 GASTROESOPHAGEAL REFLUX DISEASE, UNSPECIFIED WHETHER ESOPHAGITIS PRESENT: Primary | ICD-10-CM

## 2024-05-15 NOTE — PROGRESS NOTES
Prosper Baronarty  1965  59 y.o.    Reason for call: GERD  Prep prescribed: N/A  Prep instructions reviewed with patient and sent to patient via regular mail to the home address on file  Is the patient currently on any injectable or oral medications for weight loss or diabetes? No  Clearance needed? Yes  If yes, what clearance is needed? Cardiology  Clearance has been requested from    The patient has been scheduled for: EGD  After your procedure, you will be contacted with results. Please confirm the best phone # to reach the patient: [phone]  Family history of colon cancer? No  If yes, indicate relative: n/a   Family History   Problem Relation Age of Onset    Heart disease Mother     No Known Problems Father     Hepatitis Sister     Stroke Maternal Grandmother     Stroke Maternal Grandfather     Malig Hyperthermia Neg Hx      Past Medical History:   Diagnosis Date    CAD (coronary artery disease)     Enlarged prostate     Erectile dysfunction     Fainting spell     GERD (gastroesophageal reflux disease)     Hyperlipidemia     Hypertension     Peripheral neuropathy     Slow to wake up after anesthesia     Unstable angina      Allergies   Allergen Reactions    Atorvastatin Myalgia    Rosuvastatin Myalgia     Past Surgical History:   Procedure Laterality Date    APPENDECTOMY      CARDIAC CATHETERIZATION N/A 3/6/2020    Procedure: Coronary angiography, Left heart catheterization, Left ventricular angiography;  Surgeon: Carlos Aparicio MD;  Location: [facility] CATH INVASIVE LOCATION;  Service: Cardiology;  Laterality: N/A;    CARDIAC SURGERY      COLONOSCOPY N/A 8/20/2021    Procedure: COLONOSCOPY WITH POLYPECTOMY HOT SNARE;  Surgeon: Devon Plata MD;  Location: [facility] ENDOSCOPY;  Service: General;  Laterality: N/A;  COLON POLYP    CORONARY ARTERY BYPASS GRAFT N/A 3/11/2020    Procedure: SHARRI, MEDIAN STERNOTOMY, CORONARY ARTERY BYPASS GRAFTING X 5   UTILIZING MARIEL INTERNAL MAMMARY ARTERIES AND  ENDOSCOPICALLY HARVESTED LEFT SAPHENOUS VEIN, PRP;  Surgeon: Jr Manuel Orourke MD;  Location: [facility] OR;  Service: Cardiothoracic;  Laterality: N/A;    MOUTH SURGERY       Social History     Socioeconomic History    Marital status:    Tobacco Use    Smoking status: Every Day     Current packs/day: 1.00     Average packs/day: 1 pack/day for 48.4 years (48.4 ttl pk-yrs)     Types: Cigarettes     Start date: 1976    Smokeless tobacco: Never   Vaping Use    Vaping status: Never Used   Substance and Sexual Activity    Alcohol use: Yes     Alcohol/week: 0.0 - 1.0 standard drinks of alcohol     Comment: occas.    Drug use: Never    Sexual activity: Defer       Current Outpatient Medications:     aspirin 81 MG EC tablet, Take 1 tablet by mouth Daily., Disp: 90 tablet, Rfl: 1    carvedilol (COREG) 6.25 MG tablet, Take 1 tablet by mouth 2 (Two) Times a Day., Disp: 180 tablet, Rfl: 3    ezetimibe (ZETIA) 10 MG tablet, Take 1 tablet by mouth Daily., Disp: 90 tablet, Rfl: 3    hydrALAZINE (APRESOLINE) 10 MG tablet, Take 1 tablet by mouth 3 (Three) Times a Day., Disp: 270 tablet, Rfl: 1    losartan (COZAAR) 100 MG tablet, Take 1 tablet by mouth Daily., Disp: 90 tablet, Rfl: 3    pantoprazole (PROTONIX) 20 MG EC tablet, Take 1 tablet by mouth Daily., Disp: 90 tablet, Rfl: 1    pravastatin (PRAVACHOL) 80 MG tablet, Take 1 tablet by mouth Daily., Disp: 90 tablet, Rfl: 3    tamsulosin (FLOMAX) 0.4 MG capsule 24 hr capsule, Take 2 capsules by mouth Daily., Disp: 180 capsule, Rfl: 4    traZODone (DESYREL) 50 MG tablet, Take 1 tablet by mouth Every Night., Disp: 30 tablet, Rfl: 1

## 2024-05-20 ENCOUNTER — OFFICE VISIT (OUTPATIENT)
Dept: FAMILY MEDICINE CLINIC | Facility: CLINIC | Age: 59
End: 2024-05-20
Payer: COMMERCIAL

## 2024-05-20 VITALS
DIASTOLIC BLOOD PRESSURE: 78 MMHG | WEIGHT: 208 LBS | SYSTOLIC BLOOD PRESSURE: 134 MMHG | BODY MASS INDEX: 32.65 KG/M2 | HEART RATE: 81 BPM | TEMPERATURE: 97.7 F | HEIGHT: 67 IN | OXYGEN SATURATION: 98 %

## 2024-05-20 DIAGNOSIS — K21.9 GASTROESOPHAGEAL REFLUX DISEASE, UNSPECIFIED WHETHER ESOPHAGITIS PRESENT: ICD-10-CM

## 2024-05-20 DIAGNOSIS — I10 PRIMARY HYPERTENSION: Primary | ICD-10-CM

## 2024-05-20 PROCEDURE — 99213 OFFICE O/P EST LOW 20 MIN: CPT | Performed by: FAMILY MEDICINE

## 2024-05-20 RX ORDER — PANTOPRAZOLE SODIUM 20 MG/1
20 TABLET, DELAYED RELEASE ORAL DAILY
Qty: 90 TABLET | Refills: 1 | Status: SHIPPED | OUTPATIENT
Start: 2024-05-20

## 2024-05-20 NOTE — PROGRESS NOTES
Chief Complaint  Hypertension (3 WEEK FOLLOW UP)    Subjective          Prosper Darling presents to [facility]  Hypertension  This is a chronic problem. The current episode started more than 1 year ago. The problem has been improved since onset. The problem is controlled. Pertinent negatives include no anxiety, blurred vision, chest pain, headaches, malaise/fatigue, neck pain, orthopnea, palpitations, peripheral edema, PND, shortness of breath or sweats. There are no associated agents to hypertension. Risk factors for coronary artery disease include dyslipidemia, family history and male gender. Current antihypertension treatment includes alpha 1 blockers, direct vasodilators and beta blockers. The current treatment provides significant improvement. There are no compliance problems.        BMI is >= 30 and <35. (Class 1 Obesity). The following options were offered after discussion;: exercise counseling/recommendations and nutrition counseling/recommendations       Objective   Allergies   Allergen Reactions    Atorvastatin Myalgia    Rosuvastatin Myalgia     Immunization History   Administered Date(s) Administered    Tdap 04/30/2024     Past Medical History:   Diagnosis Date    CAD (coronary artery disease)     Enlarged prostate     Erectile dysfunction     Fainting spell     GERD (gastroesophageal reflux disease)     Hyperlipidemia     Hypertension     Peripheral neuropathy     Slow to wake up after anesthesia     Unstable angina       Past Surgical History:   Procedure Laterality Date    APPENDECTOMY      CARDIAC CATHETERIZATION N/A 3/6/2020    Procedure: Coronary angiography, Left heart catheterization, Left ventricular angiography;  Surgeon: Carlos Aparicio MD;  Location: [facility] INVASIVE LOCATION;  Service: Cardiology;  Laterality: N/A;    CARDIAC SURGERY      COLONOSCOPY N/A 8/20/2021    Procedure: COLONOSCOPY WITH POLYPECTOMY HOT SNARE;  Surgeon: Devon Plata MD;   [facility] END[facility];  Service: General;  Laterality: N/A;  COLON POLYP    CORONARY ARTERY BYPASS GRAFT N/A 3/11/2020    Procedure: SHARRI, MEDIAN STERNOTOMY, CORONARY ARTERY BYPASS GRAFTING X 5   UTILIZING MARIEL INTERNAL MAMMARY ARTERIES AND ENDOSCOPICALLY HARVESTED LEFT SAPHENOUS VEIN, PRP;  Surgeon: Jr Manuel Orourke MD;  Location: [facility] MAIN OR;  Service: Cardiothoracic;  Laterality: N/A;    MOUTH SURGERY        Social History     Socioeconomic History    Marital status:    Tobacco Use    Smoking status: Every Day     Current packs/day: 1.00     Average packs/day: 1 pack/day for 48.4 years (48.4 ttl pk-yrs)     Types: Cigarettes     Start date: 1976    Smokeless tobacco: Never   Vaping Use    Vaping status: Never Used   Substance and Sexual Activity    Alcohol use: Yes     Alcohol/week: 0.0 - 1.0 standard drinks of alcohol     Comment: occas.    Drug use: Never    Sexual activity: Defer        Current Outpatient Medications:     aspirin 81 MG EC tablet, Take 1 tablet by mouth Daily., Disp: 90 tablet, Rfl: 1    carvedilol (COREG) 6.25 MG tablet, Take 1 tablet by mouth 2 (Two) Times a Day., Disp: 180 tablet, Rfl: 3    ezetimibe (ZETIA) 10 MG tablet, Take 1 tablet by mouth Daily., Disp: 90 tablet, Rfl: 3    hydrALAZINE (APRESOLINE) 10 MG tablet, Take 1 tablet by mouth 3 (Three) Times a Day., Disp: 270 tablet, Rfl: 1    losartan (COZAAR) 100 MG tablet, Take 1 tablet by mouth Daily., Disp: 90 tablet, Rfl: 3    pantoprazole (PROTONIX) 20 MG EC tablet, Take 1 tablet by mouth Daily., Disp: 90 tablet, Rfl: 1    pravastatin (PRAVACHOL) 80 MG tablet, Take 1 tablet by mouth Daily., Disp: 90 tablet, Rfl: 3    tamsulosin (FLOMAX) 0.4 MG capsule 24 hr capsule, Take 2 capsules by mouth Daily., Disp: 180 capsule, Rfl: 4    traZODone (DESYREL) 50 MG tablet, Take 1 tablet by mouth Every Night., Disp: 30 tablet, Rfl: 1   Family History   Problem Relation Age of Onset    Heart disease Mother     No Known Problems  "Father     Hepatitis Sister     Stroke Maternal Grandmother     Stroke Maternal Grandfather     Malig Hyperthermia Neg Hx           Vital Signs:   Vitals:    05/20/24 1258   BP: 134/78   Pulse: 81   Temp: 97.7 °F (36.5 °C)   TempSrc: Temporal   SpO2: 98%   Weight: 94.3 kg (208 lb)   Height: 170.2 cm (67\")       Review of Systems   Constitutional:  Negative for fatigue, fever and malaise/fatigue.   HENT:  Negative for sore throat.    Eyes:  Negative for blurred vision and visual disturbance.   Respiratory:  Negative for cough, chest tightness, shortness of breath and wheezing.    Cardiovascular:  Negative for chest pain, palpitations, orthopnea, leg swelling and PND.   Gastrointestinal:  Negative for abdominal pain, diarrhea, nausea and vomiting.   Musculoskeletal:  Negative for neck pain.   Neurological:  Negative for dizziness, light-headedness and headaches.      Physical Exam  Vitals reviewed.   Constitutional:       Appearance: Normal appearance. He is well-developed.   HENT:      Head: Normocephalic and atraumatic.      Right Ear: External ear normal.      Left Ear: External ear normal.      Mouth/Throat:      Pharynx: No oropharyngeal exudate.   Eyes:      Conjunctiva/sclera: Conjunctivae normal.      Pupils: Pupils are equal, round, and reactive to light.   Cardiovascular:      Rate and Rhythm: Normal rate and regular rhythm.      Pulses: Normal pulses.      Heart sounds: Normal heart sounds. No murmur heard.     No friction rub. No gallop.   Pulmonary:      Effort: Pulmonary effort is normal.      Breath sounds: Normal breath sounds. No wheezing or rhonchi.   Abdominal:      General: Abdomen is flat. Bowel sounds are normal. There is no distension.      Palpations: Abdomen is soft. There is no mass.      Tenderness: There is no abdominal tenderness. There is no guarding or rebound.      Hernia: No hernia is present.   Musculoskeletal:         General: Normal range of motion.   Skin:     General: Skin is warm " and dry.      Capillary Refill: Capillary refill takes less than 2 seconds.   Neurological:      General: No focal deficit present.      Mental Status: He is alert and oriented to person, place, and time.      Cranial Nerves: No cranial nerve deficit.   Psychiatric:         Mood and Affect: Mood and affect normal.         Behavior: Behavior normal.         Thought Content: Thought content normal.         Judgment: Judgment normal.        Result Review :   The following data was reviewed by: Stuart Alva MD on 05/20/2024:  CMP          1/27/2024    09:08 4/30/2024    15:56   CMP   Glucose 94  82    BUN 14  11    Creatinine 1.05  1.03    EGFR 81.3  82.6    Sodium 143  140    Potassium 4.5  3.9    Chloride 106  104    Calcium 9.6  9.6    Total Protein 6.9  7.3    Albumin 4.5  4.3    Globulin 2.4  3.0    Total Bilirubin 0.9  0.9    Alkaline Phosphatase 67  67    AST (SGOT) 25  22    ALT (SGPT) 34  23    Albumin/Globulin Ratio 1.9  1.4    BUN/Creatinine Ratio 13.3  10.7    Anion Gap 11.0  9.3      CBC          1/27/2024    09:08 4/30/2024    15:56   CBC   WBC 7.70  8.34    RBC 5.32  5.16    Hemoglobin 15.0  15.2    Hematocrit 46.1  45.5    MCV 86.7  88.2    MCH 28.2  29.5    MCHC 32.5  33.4    RDW 13.1  13.0    Platelets 305  310      Lipid Panel          1/27/2024    09:08   Lipid Panel   Total Cholesterol 232    Triglycerides 223    HDL Cholesterol 31    VLDL Cholesterol 42    LDL Cholesterol  159    LDL/HDL Ratio 5.05      TSH          1/27/2024    09:08 4/30/2024    15:56   TSH   TSH 0.870  1.880      PSA          1/27/2024    09:08   PSA   PSA 1.170                Assessment and Plan    Diagnoses and all orders for this visit:    1. Primary hypertension (Primary)    2. Gastroesophageal reflux disease, unspecified whether esophagitis present  -     pantoprazole (PROTONIX) 20 MG EC tablet; Take 1 tablet by mouth Daily.  Dispense: 90 tablet; Refill: 1            Follow Up   Return in about 6 months (around  11/20/2024) for Recheck.  Patient was given instructions and counseling regarding his condition or for health maintenance advice. Please see specific information pulled into the AVS if appropriate.

## 2024-05-31 ENCOUNTER — OFFICE VISIT (OUTPATIENT)
Dept: FAMILY MEDICINE CLINIC | Facility: CLINIC | Age: 59
End: 2024-05-31
Payer: COMMERCIAL

## 2024-05-31 VITALS
OXYGEN SATURATION: 97 % | DIASTOLIC BLOOD PRESSURE: 90 MMHG | HEART RATE: 87 BPM | SYSTOLIC BLOOD PRESSURE: 146 MMHG | BODY MASS INDEX: 32.67 KG/M2 | WEIGHT: 208.6 LBS | TEMPERATURE: 97.9 F

## 2024-05-31 DIAGNOSIS — R53.83 FATIGUE, UNSPECIFIED TYPE: ICD-10-CM

## 2024-05-31 DIAGNOSIS — M79.10 MUSCLE PAIN: ICD-10-CM

## 2024-05-31 DIAGNOSIS — M79.604 PAIN IN BOTH LOWER EXTREMITIES: Primary | ICD-10-CM

## 2024-05-31 DIAGNOSIS — M79.605 PAIN IN BOTH LOWER EXTREMITIES: Primary | ICD-10-CM

## 2024-05-31 LAB
ALBUMIN SERPL-MCNC: 4.3 G/DL (ref 3.5–5.2)
ALBUMIN/GLOB SERPL: 1.5 G/DL
ALP SERPL-CCNC: 71 U/L (ref 39–117)
ALT SERPL W P-5'-P-CCNC: 18 U/L (ref 1–41)
ANION GAP SERPL CALCULATED.3IONS-SCNC: 11 MMOL/L (ref 5–15)
AST SERPL-CCNC: 16 U/L (ref 1–40)
BILIRUB SERPL-MCNC: 0.7 MG/DL (ref 0–1.2)
BUN SERPL-MCNC: 15 MG/DL (ref 8–23)
BUN/CREAT SERPL: 15.3 (ref 7–25)
CALCIUM SPEC-SCNC: 9.3 MG/DL (ref 8.6–10.5)
CHLORIDE SERPL-SCNC: 100 MMOL/L (ref 98–107)
CK SERPL-CCNC: 91 U/L (ref 20–200)
CO2 SERPL-SCNC: 27 MMOL/L (ref 22–29)
CREAT SERPL-MCNC: 0.98 MG/DL (ref 0.76–1.27)
EGFRCR SERPLBLD CKD-EPI 2021: 87.7 ML/MIN/1.73
FOLATE SERPL-MCNC: 4.29 NG/ML (ref 4.78–24.2)
GLOBULIN UR ELPH-MCNC: 2.8 GM/DL
GLUCOSE SERPL-MCNC: 81 MG/DL (ref 65–99)
POTASSIUM SERPL-SCNC: 4 MMOL/L (ref 3.5–5.2)
PROT SERPL-MCNC: 7.1 G/DL (ref 6–8.5)
SODIUM SERPL-SCNC: 138 MMOL/L (ref 136–145)
VIT B12 BLD-MCNC: 270 PG/ML (ref 211–946)

## 2024-05-31 PROCEDURE — 86663 EPSTEIN-BARR ANTIBODY: CPT | Performed by: FAMILY MEDICINE

## 2024-05-31 PROCEDURE — 82550 ASSAY OF CK (CPK): CPT | Performed by: FAMILY MEDICINE

## 2024-05-31 PROCEDURE — 82607 VITAMIN B-12: CPT | Performed by: FAMILY MEDICINE

## 2024-05-31 PROCEDURE — 82746 ASSAY OF FOLIC ACID SERUM: CPT | Performed by: FAMILY MEDICINE

## 2024-05-31 PROCEDURE — 80053 COMPREHEN METABOLIC PANEL: CPT | Performed by: FAMILY MEDICINE

## 2024-05-31 PROCEDURE — 99213 OFFICE O/P EST LOW 20 MIN: CPT | Performed by: FAMILY MEDICINE

## 2024-05-31 NOTE — PROGRESS NOTES
Venipuncture Blood Specimen Collection  Venipuncture performed in LEFT ARM by Angelina Wilson with good hemostasis. Patient tolerated the procedure well without complications.   05/31/24   Angelina Wilson

## 2024-05-31 NOTE — PROGRESS NOTES
Chief Complaint  Extremity Weakness and Fatigue    Subjective          Prosper Darling presents to [facility]  History of Present Illness  Pt has had fatigue and muscle pain in legs x 5 yrs- worse with walking and/or carrying things- pt sees cardiology and they cannot find heart cause  Fatigue  This is a chronic problem. The current episode started more than 1 year ago. The problem occurs constantly. The problem has been unchanged. Associated symptoms include fatigue and myalgias. Pertinent negatives include no abdominal pain, anorexia, arthralgias, change in bowel habit, chest pain, chills, congestion, coughing, diaphoresis, fever, headaches, joint swelling, nausea, neck pain, numbness, rash, sore throat, swollen glands, urinary symptoms, vertigo, visual change, vomiting or weakness. Nothing aggravates the symptoms. He has tried nothing for the symptoms.                Objective   Allergies   Allergen Reactions    Atorvastatin Myalgia    Rosuvastatin Myalgia     Immunization History   Administered Date(s) Administered    Tdap 04/30/2024     Past Medical History:   Diagnosis Date    CAD (coronary artery disease)     Enlarged prostate     Erectile dysfunction     Fainting spell     GERD (gastroesophageal reflux disease)     Hyperlipidemia     Hypertension     Peripheral neuropathy     Slow to wake up after anesthesia     Unstable angina       Past Surgical History:   Procedure Laterality Date    APPENDECTOMY      CARDIAC CATHETERIZATION N/A 3/6/2020    Procedure: Coronary angiography, Left heart catheterization, Left ventricular angiography;  Surgeon: Carlos Aparicio MD;  Location: [facility] CATH INVASIVE LOCATION;  Service: Cardiology;  Laterality: N/A;    CARDIAC SURGERY      COLONOSCOPY N/A 8/20/2021    Procedure: COLONOSCOPY WITH POLYPECTOMY HOT SNARE;  Surgeon: Devon Plata MD;  Location: [facility] ENDOSCOPY;  Service: General;  Laterality: N/A;  COLON POLYP    CORONARY  ARTERY BYPASS GRAFT N/A 3/11/2020    Procedure: SHARRI, MEDIAN STERNOTOMY, CORONARY ARTERY BYPASS GRAFTING X 5   UTILIZING MARIEL INTERNAL MAMMARY ARTERIES AND ENDOSCOPICALLY HARVESTED LEFT SAPHENOUS VEIN, PRP;  Surgeon: Jr Manuel Orourke MD;  Location: [facility];  Service: Cardiothoracic;  Laterality: N/A;    MOUTH SURGERY        Social History     Socioeconomic History    Marital status:    Tobacco Use    Smoking status: Every Day     Current packs/day: 1.00     Average packs/day: 1 pack/day for 48.4 years (48.4 ttl pk-yrs)     Types: Cigarettes     Start date: 1976    Smokeless tobacco: Never   Vaping Use    Vaping status: Never Used   Substance and Sexual Activity    Alcohol use: Yes     Alcohol/week: 0.0 - 1.0 standard drinks of alcohol     Comment: occas.    Drug use: Never    Sexual activity: Defer        Current Outpatient Medications:     aspirin 81 MG EC tablet, Take 1 tablet by mouth Daily., Disp: 90 tablet, Rfl: 1    carvedilol (COREG) 6.25 MG tablet, Take 1 tablet by mouth 2 (Two) Times a Day., Disp: 180 tablet, Rfl: 3    ezetimibe (ZETIA) 10 MG tablet, Take 1 tablet by mouth Daily., Disp: 90 tablet, Rfl: 3    hydrALAZINE (APRESOLINE) 10 MG tablet, Take 1 tablet by mouth 3 (Three) Times a Day., Disp: 270 tablet, Rfl: 1    losartan (COZAAR) 100 MG tablet, Take 1 tablet by mouth Daily., Disp: 90 tablet, Rfl: 3    pantoprazole (PROTONIX) 20 MG EC tablet, Take 1 tablet by mouth Daily., Disp: 90 tablet, Rfl: 1    pravastatin (PRAVACHOL) 80 MG tablet, Take 1 tablet by mouth Daily., Disp: 90 tablet, Rfl: 3    tamsulosin (FLOMAX) 0.4 MG capsule 24 hr capsule, Take 2 capsules by mouth Daily., Disp: 180 capsule, Rfl: 4    traZODone (DESYREL) 50 MG tablet, Take 1 tablet by mouth Every Night., Disp: 30 tablet, Rfl: 1   Family History   Problem Relation Age of Onset    Heart disease Mother     No Known Problems Father     Hepatitis Sister     Stroke Maternal Grandmother     Stroke Maternal Grandfather      Malig Hyperthermia Neg Hx           Vital Signs:   Vitals:    05/31/24 1416   BP: 146/90   Pulse: 87   Temp: 97.9 °F (36.6 °C)   SpO2: 97%   Weight: 94.6 kg (208 lb 9.6 oz)       Review of Systems   Constitutional:  Positive for fatigue. Negative for chills, diaphoresis and fever.   HENT:  Negative for congestion and sore throat.    Respiratory:  Negative for cough.    Cardiovascular:  Negative for chest pain.   Gastrointestinal:  Negative for abdominal pain, anorexia, change in bowel habit, nausea and vomiting.   Musculoskeletal:  Positive for myalgias. Negative for arthralgias, joint swelling and neck pain.   Skin:  Negative for rash.   Neurological:  Negative for vertigo, weakness, numbness and headaches.      Physical Exam  Vitals reviewed.   Constitutional:       Appearance: Normal appearance. He is well-developed.   HENT:      Head: Normocephalic and atraumatic.      Right Ear: External ear normal.      Left Ear: External ear normal.      Mouth/Throat:      Pharynx: No oropharyngeal exudate.   Eyes:      Conjunctiva/sclera: Conjunctivae normal.      Pupils: Pupils are equal, round, and reactive to light.   Cardiovascular:      Rate and Rhythm: Normal rate and regular rhythm.      Pulses: Normal pulses.      Heart sounds: Normal heart sounds. No murmur heard.     No friction rub. No gallop.      Comments: Decreased pedal pulses bilaterally.  Pulmonary:      Effort: Pulmonary effort is normal.      Breath sounds: Normal breath sounds. No wheezing or rhonchi.   Abdominal:      General: Abdomen is flat. Bowel sounds are normal. There is no distension.      Palpations: Abdomen is soft. There is no mass.      Tenderness: There is no abdominal tenderness. There is no guarding or rebound.      Hernia: No hernia is present.   Musculoskeletal:         General: Normal range of motion.      Cervical back: Normal range of motion and neck supple.   Skin:     General: Skin is warm and dry.      Capillary Refill: Capillary  refill takes less than 2 seconds.   Neurological:      General: No focal deficit present.      Mental Status: He is alert and oriented to person, place, and time.      Cranial Nerves: No cranial nerve deficit.   Psychiatric:         Mood and Affect: Mood and affect normal.         Behavior: Behavior normal.         Thought Content: Thought content normal.         Judgment: Judgment normal.        Result Review :   The following data was reviewed by: Stuart Alva MD on 05/31/2024:  CMP          1/27/2024    09:08 4/30/2024    15:56   CMP   Glucose 94  82    BUN 14  11    Creatinine 1.05  1.03    EGFR 81.3  82.6    Sodium 143  140    Potassium 4.5  3.9    Chloride 106  104    Calcium 9.6  9.6    Total Protein 6.9  7.3    Albumin 4.5  4.3    Globulin 2.4  3.0    Total Bilirubin 0.9  0.9    Alkaline Phosphatase 67  67    AST (SGOT) 25  22    ALT (SGPT) 34  23    Albumin/Globulin Ratio 1.9  1.4    BUN/Creatinine Ratio 13.3  10.7    Anion Gap 11.0  9.3      CBC          1/27/2024    09:08 4/30/2024    15:56   CBC   WBC 7.70  8.34    RBC 5.32  5.16    Hemoglobin 15.0  15.2    Hematocrit 46.1  45.5    MCV 86.7  88.2    MCH 28.2  29.5    MCHC 32.5  33.4    RDW 13.1  13.0    Platelets 305  310      Lipid Panel          1/27/2024    09:08   Lipid Panel   Total Cholesterol 232    Triglycerides 223    HDL Cholesterol 31    VLDL Cholesterol 42    LDL Cholesterol  159    LDL/HDL Ratio 5.05      TSH          1/27/2024    09:08 4/30/2024    15:56   TSH   TSH 0.870  1.880                Assessment and Plan    Diagnoses and all orders for this visit:    1. Pain in both lower extremities (Primary)  -     CK  -     Duplex Lower Extremity Art / Grafts - Bilateral CAR; Future    2. Fatigue, unspecified type  -     Comprehensive Metabolic Panel  -     Vitamin B12 & Folate  -     EBV Antibody Profile  -     Wale-Barr Virus Early Antigen Antibody, IgG    3. Muscle pain  -     CK            Follow Up   Return in about 1 month (around  6/30/2024) for Recheck.  Patient was given instructions and counseling regarding his condition or for health maintenance advice. Please see specific information pulled into the AVS if appropriate.

## 2024-06-01 DIAGNOSIS — E53.8 FOLIC ACID DEFICIENCY: Primary | ICD-10-CM

## 2024-06-01 DIAGNOSIS — E53.8 VITAMIN B12 DEFICIENCY: ICD-10-CM

## 2024-06-01 RX ORDER — CYANOCOBALAMIN 1000 UG/ML
1000 INJECTION, SOLUTION INTRAMUSCULAR; SUBCUTANEOUS
Qty: 3 ML | Refills: 3 | Status: SHIPPED | OUTPATIENT
Start: 2024-06-01

## 2024-06-01 RX ORDER — FOLIC ACID 1 MG/1
1 TABLET ORAL DAILY
Qty: 30 TABLET | Refills: 2 | Status: SHIPPED | OUTPATIENT
Start: 2024-06-01

## 2024-06-01 NOTE — PROGRESS NOTES
Call pt: labs show vitamin b12 and folic acid deficiencies so I sent in oral folic acid to take and vitamin b12 shots that pt can  from pharmacy and bring to office to get shot once monthly.  I sent scripts to [facility].  Recheck levels in 2-3 months.

## 2024-06-03 ENCOUNTER — TELEPHONE (OUTPATIENT)
Dept: FAMILY MEDICINE CLINIC | Facility: CLINIC | Age: 59
End: 2024-06-03

## 2024-06-03 ENCOUNTER — CLINICAL SUPPORT (OUTPATIENT)
Dept: FAMILY MEDICINE CLINIC | Facility: CLINIC | Age: 59
End: 2024-06-03
Payer: COMMERCIAL

## 2024-06-03 DIAGNOSIS — E53.8 VITAMIN B12 DEFICIENCY: Primary | ICD-10-CM

## 2024-06-03 LAB
EBV EA-D IGG SER-ACNC: <9 U/ML (ref 0–8.9)
EBV NA IGG SER IA-ACNC: >600 U/ML (ref 0–17.9)
EBV VCA IGG SER IA-ACNC: >600 U/ML (ref 0–17.9)
EBV VCA IGM SER IA-ACNC: <36 U/ML (ref 0–35.9)
SERVICE CMNT-IMP: ABNORMAL

## 2024-06-03 PROCEDURE — 96372 THER/PROPH/DIAG INJ SC/IM: CPT | Performed by: FAMILY MEDICINE

## 2024-06-03 RX ORDER — CYANOCOBALAMIN 1000 UG/ML
1000 INJECTION, SOLUTION INTRAMUSCULAR; SUBCUTANEOUS ONCE
Status: COMPLETED | OUTPATIENT
Start: 2024-06-03 | End: 2024-06-03

## 2024-06-03 RX ADMIN — CYANOCOBALAMIN 1000 MCG: 1000 INJECTION, SOLUTION INTRAMUSCULAR; SUBCUTANEOUS at 14:30

## 2024-06-03 NOTE — TELEPHONE ENCOUNTER
Caller: Prosper Darling    Relationship: Self    Best call back number: [phone]     What is the best time to reach you: ANY    Who are you requesting to speak with (clinical staff, provider,  specific staff member): CLINICAL STAFF    What was the call regarding: PATIENT CALLED STATING THAT HE RECENTLY WAS PRESCRIBED FOLIC ACID AND HE WOULD LIKE TO KNOW THE BEST TIME OF DAY TO TAKE THE MEDICATION

## 2024-06-07 ENCOUNTER — ANESTHESIA EVENT (OUTPATIENT)
Dept: GASTROENTEROLOGY | Facility: HOSPITAL | Age: 59
End: 2024-06-07
Payer: COMMERCIAL

## 2024-06-07 RX ORDER — SODIUM CHLORIDE, SODIUM LACTATE, POTASSIUM CHLORIDE, CALCIUM CHLORIDE 600; 310; 30; 20 MG/100ML; MG/100ML; MG/100ML; MG/100ML
30 INJECTION, SOLUTION INTRAVENOUS CONTINUOUS
Status: CANCELLED | OUTPATIENT
Start: 2024-06-07

## 2024-06-07 NOTE — ANESTHESIA PREPROCEDURE EVALUATION
Anesthesia Evaluation     Patient summary reviewed and Nursing notes reviewed   NPO Solid Status: > 8 hours  NPO Liquid Status: > 2 hours           Airway   Mallampati: I  TM distance: >3 FB  Neck ROM: full  No difficulty expected and Small opening  Dental    (+) edentulous    Pulmonary     breath sounds clear to auscultation  (+) a smoker Current, Smoked day of surgery, cigarettes,  Cardiovascular - normal exam  Exercise tolerance: good (4-7 METS)    ECG reviewed  Rhythm: regular  Rate: normal    (+) hypertension well controlled 2 medications or greater, past MI (2020)  >12 months, CAD, CABG (2020) >6 Months, angina, hyperlipidemia      Neuro/Psych  (+) syncope, numbness, psychiatric history Anxiety and Depression  GI/Hepatic/Renal/Endo    (+) obesity, GERD well controlled    Musculoskeletal     Abdominal    Substance History      OB/GYN          Other        ROS/Med Hx Other: EKG 03/13/24: HR 73, SR, nonspecific T abnormalities    ECHO 03/06/20: EF 55% , normal left ventricular size and systolic function    Takes ASA 81mg, last dose 6/09            Phys Exam Other: beard              Anesthesia Plan    ASA 3     general   total IV anesthesia  (Total IV Anesthesia    Patient understands anesthesia not responsible for dental damage.  )  intravenous induction     Anesthetic plan, risks, benefits, and alternatives have been provided, discussed and informed consent has been obtained with: patient.  Pre-procedure education provided  Plan discussed with CRNA.      CODE STATUS:

## 2024-06-10 ENCOUNTER — ANESTHESIA (OUTPATIENT)
Dept: GASTROENTEROLOGY | Facility: HOSPITAL | Age: 59
End: 2024-06-10
Payer: COMMERCIAL

## 2024-06-10 ENCOUNTER — HOSPITAL ENCOUNTER (OUTPATIENT)
Facility: HOSPITAL | Age: 59
Setting detail: HOSPITAL OUTPATIENT SURGERY
Discharge: HOME | End: 2024-06-10
Attending: INTERNAL MEDICINE | Admitting: INTERNAL MEDICINE
Payer: COMMERCIAL

## 2024-06-10 VITALS
BODY MASS INDEX: 32.8 KG/M2 | TEMPERATURE: 97.5 F | DIASTOLIC BLOOD PRESSURE: 87 MMHG | RESPIRATION RATE: 15 BRPM | OXYGEN SATURATION: 93 % | WEIGHT: 209.44 LBS | SYSTOLIC BLOOD PRESSURE: 137 MMHG | HEART RATE: 53 BPM

## 2024-06-10 DIAGNOSIS — K21.9 GASTROESOPHAGEAL REFLUX DISEASE, UNSPECIFIED WHETHER ESOPHAGITIS PRESENT: ICD-10-CM

## 2024-06-10 PROCEDURE — 88305 TISSUE EXAM BY PATHOLOGIST: CPT | Performed by: INTERNAL MEDICINE

## 2024-06-10 PROCEDURE — 43239 EGD BIOPSY SINGLE/MULTIPLE: CPT | Performed by: INTERNAL MEDICINE

## 2024-06-10 PROCEDURE — 25010000002 PROPOFOL 10 MG/ML EMULSION

## 2024-06-10 PROCEDURE — 25810000003 LACTATED RINGERS PER 1000 ML

## 2024-06-10 RX ORDER — SODIUM CHLORIDE, SODIUM LACTATE, POTASSIUM CHLORIDE, CALCIUM CHLORIDE 600; 310; 30; 20 MG/100ML; MG/100ML; MG/100ML; MG/100ML
INJECTION, SOLUTION INTRAVENOUS CONTINUOUS PRN
Status: DISCONTINUED | OUTPATIENT
Start: 2024-06-10 | End: 2024-06-10 | Stop reason: SURG

## 2024-06-10 RX ORDER — LIDOCAINE HYDROCHLORIDE 20 MG/ML
INJECTION, SOLUTION EPIDURAL; INFILTRATION; INTRACAUDAL; PERINEURAL AS NEEDED
Status: DISCONTINUED | OUTPATIENT
Start: 2024-06-10 | End: 2024-06-10 | Stop reason: SURG

## 2024-06-10 RX ORDER — PROPOFOL 10 MG/ML
VIAL (ML) INTRAVENOUS AS NEEDED
Status: DISCONTINUED | OUTPATIENT
Start: 2024-06-10 | End: 2024-06-10 | Stop reason: SURG

## 2024-06-10 RX ADMIN — PROPOFOL 150 MCG/KG/MIN: 10 INJECTION, EMULSION INTRAVENOUS at 10:36

## 2024-06-10 RX ADMIN — LIDOCAINE HYDROCHLORIDE 80 MG: 20 INJECTION, SOLUTION EPIDURAL; INFILTRATION; INTRACAUDAL; PERINEURAL at 10:35

## 2024-06-10 RX ADMIN — PROPOFOL 60 MG: 10 INJECTION, EMULSION INTRAVENOUS at 10:35

## 2024-06-10 RX ADMIN — SODIUM CHLORIDE, POTASSIUM CHLORIDE, SODIUM LACTATE AND CALCIUM CHLORIDE: 600; 310; 30; 20 INJECTION, SOLUTION INTRAVENOUS at 10:32

## 2024-06-10 NOTE — H&P
Pre Procedure History & Physical    Chief Complaint:   GERD    Subjective     HPI:   60 yo M here for eval of GERD.    Past Medical History:   Past Medical History:   Diagnosis Date    CAD (coronary artery disease)     Enlarged prostate     Erectile dysfunction     Fainting spell     GERD (gastroesophageal reflux disease)     Hyperlipidemia     Hypertension     Peripheral neuropathy     Slow to wake up after anesthesia     Unstable angina        Past Surgical History:  Past Surgical History:   Procedure Laterality Date    APPENDECTOMY      CARDIAC CATHETERIZATION N/A 3/6/2020    Procedure: Coronary angiography, Left heart catheterization, Left ventricular angiography;  Surgeon: Carlos Aparicio MD;  Location: [facility] CATH INVASIVE LOCATION;  Service: Cardiology;  Laterality: N/A;    CARDIAC SURGERY      COLONOSCOPY N/A 8/20/2021    Procedure: COLONOSCOPY WITH POLYPECTOMY HOT SNARE;  Surgeon: Devon Plata MD;  Location: [facility] ENDOSCOPY;  Service: General;  Laterality: N/A;  COLON POLYP    CORONARY ARTERY BYPASS GRAFT N/A 3/11/2020    Procedure: SHARRI, MEDIAN STERNOTOMY, CORONARY ARTERY BYPASS GRAFTING X 5   UTILIZING MARIEL INTERNAL MAMMARY ARTERIES AND ENDOSCOPICALLY HARVESTED LEFT SAPHENOUS VEIN, PRP;  Surgeon: Jr Manuel Orourke MD;  Location: [facility] OR;  Service: Cardiothoracic;  Laterality: N/A;    MOUTH SURGERY         Family History:  Family History   Problem Relation Age of Onset    Heart disease Mother     No Known Problems Father     Hepatitis Sister     Stroke Maternal Grandmother     Stroke Maternal Grandfather     Malig Hyperthermia Neg Hx        Social History:   reports that he has been smoking cigarettes. He started smoking about 48 years ago. He has a 48.4 pack-year smoking history. He has never used smokeless tobacco. He reports current alcohol use. He reports that he does not use drugs.    Medications:   Medications Prior to Admission   Medication Sig Dispense Refill Last Dose     aspirin 81 MG EC tablet Take 1 tablet by mouth Daily. 90 tablet 1     carvedilol (COREG) 6.25 MG tablet Take 1 tablet by mouth 2 (Two) Times a Day. 180 tablet 3     cyanocobalamin 1000 MCG/ML injection Inject 1 mL into the appropriate muscle as directed by prescriber Every 28 (Twenty-Eight) Days. 3 mL 3     ezetimibe (ZETIA) 10 MG tablet Take 1 tablet by mouth Daily. 90 tablet 3     folic acid (FOLVITE) 1 MG tablet Take 1 tablet by mouth Daily. 30 tablet 2     hydrALAZINE (APRESOLINE) 10 MG tablet Take 1 tablet by mouth 3 (Three) Times a Day. 270 tablet 1     losartan (COZAAR) 100 MG tablet Take 1 tablet by mouth Daily. 90 tablet 3     pantoprazole (PROTONIX) 20 MG EC tablet Take 1 tablet by mouth Daily. 90 tablet 1     pravastatin (PRAVACHOL) 80 MG tablet Take 1 tablet by mouth Daily. 90 tablet 3     tamsulosin (FLOMAX) 0.4 MG capsule 24 hr capsule Take 2 capsules by mouth Daily. 180 capsule 4     traZODone (DESYREL) 50 MG tablet Take 1 tablet by mouth Every Night. 30 tablet 1        Allergies:  Atorvastatin and Rosuvastatin    ROS:    Pertinent items are noted in HPI     Objective     Weight 95 kg (209 lb 7 oz).    Physical Exam   Constitutional: Pt is oriented to person, place, and time and well-developed, well-nourished, and in no distress.   Mouth/Throat: Oropharynx is clear and moist.   Neck: Normal range of motion.   Cardiovascular: Normal rate, regular rhythm and normal heart sounds.    Pulmonary/Chest: Effort normal and breath sounds normal.   Abdominal: Soft. Nontender  Skin: Skin is warm and dry.   Psychiatric: Mood, memory, affect and judgment normal.     Assessment & Plan     Diagnosis:  GERD    Anticipated Surgical Procedure:  EGD    The risks, benefits, and alternatives of this procedure have been discussed with the patient or the responsible party- the patient understands and agrees to proceed.

## 2024-06-10 NOTE — ANESTHESIA POSTPROCEDURE EVALUATION
Patient: Prosper Darling    Procedure Summary       Date: 06/10/24 Room / Location: [facility] ENDOSCOPY 1 / [facility] ENDOSCOPY    Anesthesia Start: 1032 Anesthesia Stop: 1052    Procedure: ESOPHAGOGASTRODUODENOSCOPY with biopsy Diagnosis:       Gastroesophageal reflux disease, unspecified whether esophagitis present      (Gastroesophageal reflux disease, unspecified whether esophagitis present [K21.9])    Surgeons: Theresa Frey MD Provider: Tiffany Taylor CRNA    Anesthesia Type: general ASA Status: 3            Anesthesia Type: general    Vitals  Vitals Value Taken Time   /87 06/10/24 1107   Temp 36.4 °C (97.5 °F) 06/10/24 1107   Pulse 67 06/10/24 1109   Resp 15 06/10/24 1107   SpO2 95 % 06/10/24 1109   Vitals shown include unfiled device data.        Post Anesthesia Care and Evaluation    Post-procedure mental status: acceptable.  Pain management: satisfactory to patient    Airway patency: patent  Anesthetic complications: No anesthetic complications    Cardiovascular status: acceptable  Respiratory status: acceptable and room air    Comments: Per chart review

## 2024-06-12 ENCOUNTER — TELEPHONE (OUTPATIENT)
Dept: GASTROENTEROLOGY | Facility: CLINIC | Age: 59
End: 2024-06-12
Payer: COMMERCIAL

## 2024-06-12 NOTE — TELEPHONE ENCOUNTER
----- Message from Ana Maria Nicole sent at 6/11/2024  6:16 PM EDT -----  Biopsies are consistent with reflux esophagitis and chronic duodenitis.  Continue current PPI therapy, avoid NSAIDS if possible and keep f/u with PCP.

## 2024-06-24 ENCOUNTER — TELEPHONE (OUTPATIENT)
Dept: FAMILY MEDICINE CLINIC | Facility: CLINIC | Age: 59
End: 2024-06-24

## 2024-06-24 NOTE — TELEPHONE ENCOUNTER
Caller: Prosper Darling    Relationship: Self    Best call back number: [phone]    What is the medical concern/diagnosis: PROBLEMS WITH GRIEVING    What specialty or service is being requested: COUNSELING SERVICES     Any additional details: PATIENT WOULD LIKE TO GO FORWARD WITH THE REFERRAL, AND WOULD LIKE A CALL WITH REFERRAL INFORMATION.

## 2024-06-25 DIAGNOSIS — F41.9 ANXIETY AND DEPRESSION: Primary | ICD-10-CM

## 2024-06-25 DIAGNOSIS — F32.A ANXIETY AND DEPRESSION: Primary | ICD-10-CM

## 2024-07-02 ENCOUNTER — TELEPHONE (OUTPATIENT)
Dept: FAMILY MEDICINE CLINIC | Facility: CLINIC | Age: 59
End: 2024-07-02

## 2024-07-02 ENCOUNTER — CLINICAL SUPPORT (OUTPATIENT)
Dept: FAMILY MEDICINE CLINIC | Facility: CLINIC | Age: 59
End: 2024-07-02
Payer: COMMERCIAL

## 2024-07-02 DIAGNOSIS — E53.8 VITAMIN B12 DEFICIENCY: Primary | ICD-10-CM

## 2024-07-02 DIAGNOSIS — Z12.2 SCREENING FOR LUNG CANCER: Primary | ICD-10-CM

## 2024-07-02 PROCEDURE — 96372 THER/PROPH/DIAG INJ SC/IM: CPT | Performed by: FAMILY MEDICINE

## 2024-07-02 RX ORDER — CYANOCOBALAMIN 1000 UG/ML
1000 INJECTION, SOLUTION INTRAMUSCULAR; SUBCUTANEOUS ONCE
Status: COMPLETED | OUTPATIENT
Start: 2024-07-02 | End: 2024-07-02

## 2024-07-02 RX ADMIN — CYANOCOBALAMIN 1000 MCG: 1000 INJECTION, SOLUTION INTRAMUSCULAR; SUBCUTANEOUS at 12:52

## 2024-07-02 NOTE — PROGRESS NOTES
Venipuncture Blood Specimen Collection  Venipuncture performed in LEFT ARM  by Angelina Wilson with good hemostasis. Patient tolerated the procedure well without complications.   07/02/24   Angelina Wilson

## 2024-07-02 NOTE — TELEPHONE ENCOUNTER
Patient received letter from [facility] that he is due for yearly ct chest screening.  Can you please place order for this?    Smoking history is documented in chart-Smoking  Every Day, 1 ppd, 48.5 pack-years

## 2024-07-05 ENCOUNTER — HOSPITAL ENCOUNTER (OUTPATIENT)
Dept: CARDIOLOGY | Facility: HOSPITAL | Age: 59
Discharge: HOME | End: 2024-07-05
Admitting: FAMILY MEDICINE
Payer: COMMERCIAL

## 2024-07-05 DIAGNOSIS — M79.605 PAIN IN BOTH LOWER EXTREMITIES: ICD-10-CM

## 2024-07-05 DIAGNOSIS — M79.604 PAIN IN BOTH LOWER EXTREMITIES: ICD-10-CM

## 2024-07-05 LAB
BH CV LOWER ARTERIAL LEFT ABI RATIO: 1.03
BH CV LOWER ARTERIAL LEFT DORSALIS PEDIS SYS MAX: 144
BH CV LOWER ARTERIAL LEFT GREAT TOE SYS MAX: 157
BH CV LOWER ARTERIAL LEFT LOW THIGH SYS MAX: 173
BH CV LOWER ARTERIAL LEFT POPLITEAL SYS MAX: 192
BH CV LOWER ARTERIAL LEFT POST TIBIAL SYS MAX: 177
BH CV LOWER ARTERIAL LEFT TBI RATIO: 0.91
BH CV LOWER ARTERIAL RIGHT ABI RATIO: 1.1
BH CV LOWER ARTERIAL RIGHT DORSALIS PEDIS SYS MAX: 172
BH CV LOWER ARTERIAL RIGHT GREAT TOE SYS MAX: 144
BH CV LOWER ARTERIAL RIGHT LOW THIGH SYS MAX: 170
BH CV LOWER ARTERIAL RIGHT POPLITEAL SYS MAX: 217
BH CV LOWER ARTERIAL RIGHT POST TIBIAL SYS MAX: 189
BH CV LOWER ARTERIAL RIGHT TBI RATIO: 0.84
UPPER ARTERIAL LEFT ARM BRACHIAL SYS MAX: 172
UPPER ARTERIAL RIGHT ARM BRACHIAL SYS MAX: 161

## 2024-07-05 PROCEDURE — 93923 UPR/LXTR ART STDY 3+ LVLS: CPT

## 2024-07-15 ENCOUNTER — HOSPITAL ENCOUNTER (OUTPATIENT)
Dept: CT IMAGING | Facility: HOSPITAL | Age: 59
Discharge: HOME | End: 2024-07-15
Admitting: FAMILY MEDICINE
Payer: COMMERCIAL

## 2024-07-15 DIAGNOSIS — Z12.2 SCREENING FOR LUNG CANCER: ICD-10-CM

## 2024-07-15 PROCEDURE — 71271 CT THORAX LUNG CANCER SCR C-: CPT

## 2024-08-05 ENCOUNTER — CLINICAL SUPPORT (OUTPATIENT)
Dept: FAMILY MEDICINE CLINIC | Facility: CLINIC | Age: 59
End: 2024-08-05
Payer: COMMERCIAL

## 2024-08-05 DIAGNOSIS — E53.8 VITAMIN B12 DEFICIENCY: Primary | ICD-10-CM

## 2024-08-05 PROCEDURE — 96372 THER/PROPH/DIAG INJ SC/IM: CPT | Performed by: FAMILY MEDICINE

## 2024-08-05 RX ORDER — CYANOCOBALAMIN 1000 UG/ML
1000 INJECTION, SOLUTION INTRAMUSCULAR; SUBCUTANEOUS ONCE
Status: COMPLETED | OUTPATIENT
Start: 2024-08-05 | End: 2024-08-05

## 2024-08-05 RX ADMIN — CYANOCOBALAMIN 1000 MCG: 1000 INJECTION, SOLUTION INTRAMUSCULAR; SUBCUTANEOUS at 13:07

## 2024-08-09 ENCOUNTER — OFFICE VISIT (OUTPATIENT)
Dept: UROLOGY | Facility: CLINIC | Age: 59
End: 2024-08-09
Payer: COMMERCIAL

## 2024-08-09 VITALS
SYSTOLIC BLOOD PRESSURE: 153 MMHG | BODY MASS INDEX: 33.12 KG/M2 | WEIGHT: 211 LBS | DIASTOLIC BLOOD PRESSURE: 73 MMHG | HEIGHT: 67 IN | HEART RATE: 77 BPM

## 2024-08-09 DIAGNOSIS — R39.15 URINARY URGENCY: ICD-10-CM

## 2024-08-09 DIAGNOSIS — N40.0 BENIGN PROSTATIC HYPERPLASIA, UNSPECIFIED WHETHER LOWER URINARY TRACT SYMPTOMS PRESENT: Primary | ICD-10-CM

## 2024-08-09 PROBLEM — I10 HIGH BLOOD PRESSURE: Status: RESOLVED | Noted: 2021-06-30 | Resolved: 2024-08-09

## 2024-08-09 PROBLEM — I21.9 HEART ATTACK: Status: RESOLVED | Noted: 2022-02-11 | Resolved: 2024-08-09

## 2024-08-09 PROBLEM — N42.9 PROSTATE DISORDER: Status: RESOLVED | Noted: 2022-02-11 | Resolved: 2024-08-09

## 2024-08-09 PROBLEM — Z12.11 ENCOUNTER FOR SCREENING FOR MALIGNANT NEOPLASM OF COLON: Status: RESOLVED | Noted: 2021-08-20 | Resolved: 2024-08-09

## 2024-08-09 PROBLEM — R07.9 CHEST PAIN: Status: RESOLVED | Noted: 2022-02-11 | Resolved: 2024-08-09

## 2024-08-09 LAB
BILIRUB BLD-MCNC: NEGATIVE MG/DL
CLARITY, POC: CLEAR
COLOR UR: YELLOW
EXPIRATION DATE: NORMAL
GLUCOSE UR STRIP-MCNC: NEGATIVE MG/DL
KETONES UR QL: NEGATIVE
LEUKOCYTE EST, POC: NEGATIVE
Lab: NORMAL
NITRITE UR-MCNC: NEGATIVE MG/ML
PH UR: 6 [PH] (ref 5–8)
PROT UR STRIP-MCNC: NEGATIVE MG/DL
RBC # UR STRIP: NEGATIVE /UL
SP GR UR: 1.02 (ref 1–1.03)
URINE VOLUME: 0
UROBILINOGEN UR QL: NORMAL

## 2024-08-09 RX ORDER — MIRABEGRON 25 MG/1
25 TABLET, FILM COATED, EXTENDED RELEASE ORAL DAILY
Qty: 90 TABLET | Refills: 3 | Status: SHIPPED | OUTPATIENT
Start: 2024-08-09 | End: 2024-08-09

## 2024-08-09 RX ORDER — MIRABEGRON 25 MG/1
25 TABLET, FILM COATED, EXTENDED RELEASE ORAL DAILY
Qty: 90 TABLET | Refills: 3 | Status: SHIPPED | OUTPATIENT
Start: 2024-08-09

## 2024-08-09 NOTE — PROGRESS NOTES
Chief Complaint: Follow-up (Benign prostatic hyperplasia, unspecified whether lower urinary tract symptoms present. Patient stated he feels like he is peeing every 20 minutes. Patient on Flomax. )    Subjective         Benign Prostatic Hypertrophy    Follow-up    Prosper Darling is a 59 y.o. male presents to [facility] to be seen for follow-up on BPH.    The patient has been on tamsulosin 0.8 mg daily for over a year.    He states that over the last year he has been with worsening issues with urinary urgency and frequency.     He states he is voiding every 1-1.5 hours with extreme urge.    He is with nocturia x  0    Stream is good.     He denies straining or hesitancy.         Objective     Past Medical History:   Diagnosis Date    CAD (coronary artery disease)     Enlarged prostate     Erectile dysfunction     Fainting spell     GERD (gastroesophageal reflux disease)     Hyperlipidemia     Hypertension     Peripheral neuropathy     Slow to wake up after anesthesia     Unstable angina        Past Surgical History:   Procedure Laterality Date    APPENDECTOMY      CARDIAC CATHETERIZATION N/A 3/6/2020    Procedure: Coronary angiography, Left heart catheterization, Left ventricular angiography;  Surgeon: Carlos Aparicio MD;  Location: [facility] CATH INVASIVE LOCATION;  Service: Cardiology;  Laterality: N/A;    CARDIAC SURGERY      COLONOSCOPY N/A 8/20/2021    Procedure: COLONOSCOPY WITH POLYPECTOMY HOT SNARE;  Surgeon: Devon Plata MD;  Location: [facility] ENDOSCOPY;  Service: General;  Laterality: N/A;  COLON POLYP    CORONARY ARTERY BYPASS GRAFT N/A 3/11/2020    Procedure: SHARRI, MEDIAN STERNOTOMY, CORONARY ARTERY BYPASS GRAFTING X 5   UTILIZING MARIEL INTERNAL MAMMARY ARTERIES AND ENDOSCOPICALLY HARVESTED LEFT SAPHENOUS VEIN, PRP;  Surgeon: Jr Manuel Orourke MD;  Location: [facility] OR;  Service: Cardiothoracic;  Laterality: N/A;    ENDOSCOPY N/A 6/10/2024    Procedure:  ESOPHAGOGASTRODUODENOSCOPY with biopsy;  Surgeon: Theresa Frey MD;  Location: [facility] ENDOSCOPY;  Service: Gastroenterology;  Laterality: N/A;  hiatal hernia, gastritis    MOUTH SURGERY           Current Outpatient Medications:     aspirin 81 MG EC tablet, Take 1 tablet by mouth Daily., Disp: 90 tablet, Rfl: 1    carvedilol (COREG) 6.25 MG tablet, Take 1 tablet by mouth 2 (Two) Times a Day., Disp: 180 tablet, Rfl: 3    cyanocobalamin 1000 MCG/ML injection, Inject 1 mL into the appropriate muscle as directed by prescriber Every 28 (Twenty-Eight) Days., Disp: 3 mL, Rfl: 3    ezetimibe (ZETIA) 10 MG tablet, Take 1 tablet by mouth Daily., Disp: 90 tablet, Rfl: 3    folic acid (FOLVITE) 1 MG tablet, Take 1 tablet by mouth Daily., Disp: 30 tablet, Rfl: 2    hydrALAZINE (APRESOLINE) 10 MG tablet, Take 1 tablet by mouth 3 (Three) Times a Day., Disp: 270 tablet, Rfl: 1    losartan (COZAAR) 100 MG tablet, Take 1 tablet by mouth Daily., Disp: 90 tablet, Rfl: 3    Mirabegron ER (Myrbetriq) 25 MG tablet sustained-release 24 hour 24 hr tablet, Take 1 tablet by mouth Daily., Disp: 90 tablet, Rfl: 3    pantoprazole (PROTONIX) 20 MG EC tablet, Take 1 tablet by mouth Daily., Disp: 90 tablet, Rfl: 1    pravastatin (PRAVACHOL) 80 MG tablet, Take 1 tablet by mouth Daily., Disp: 90 tablet, Rfl: 3    tamsulosin (FLOMAX) 0.4 MG capsule 24 hr capsule, Take 2 capsules by mouth Daily., Disp: 180 capsule, Rfl: 4    traZODone (DESYREL) 50 MG tablet, Take 1 tablet by mouth Every Night., Disp: 30 tablet, Rfl: 1    Allergies   Allergen Reactions    Atorvastatin Myalgia    Rosuvastatin Myalgia        Family History   Problem Relation Age of Onset    Heart disease Mother     No Known Problems Father     Hepatitis Sister     Stroke Maternal Grandmother     Stroke Maternal Grandfather     Malig Hyperthermia Neg Hx        Social History     Socioeconomic History    Marital status:    Tobacco Use    Smoking status: Every Day     Current  "packs/day: 1.00     Average packs/day: 1 pack/day for 48.6 years (48.6 ttl pk-yrs)     Types: Cigarettes     Start date: 1976     Passive exposure: Current    Smokeless tobacco: Never   Vaping Use    Vaping status: Never Used   Substance and Sexual Activity    Alcohol use: Yes     Alcohol/week: 0.0 - 1.0 standard drinks of alcohol     Comment: occas.    Drug use: Never    Sexual activity: Defer       Vital Signs:   /73 (BP Location: Right arm, Patient Position: Sitting, Cuff Size: Adult)   Pulse 77   Ht 170.2 cm (67.01\")   Wt 95.7 kg (211 lb)   BMI 33.04 kg/m²      Physical Exam     Result Review :   The following data was reviewed by: JAIME De La Cruz on 08/09/2024:  Results for orders placed or performed in visit on 08/09/24   Bladder Scan   Result Value Ref Range    Urine Volume 0    POC Urinalysis Dipstick, Automated    Specimen: Urine   Result Value Ref Range    Color Yellow Yellow, Straw, Dark Yellow, Ana M    Clarity, UA Clear Clear    Specific Gravity  1.025 1.005 - 1.030    pH, Urine 6.0 5.0 - 8.0    Leukocytes Negative Negative    Nitrite, UA Negative Negative    Protein, POC Negative Negative mg/dL    Glucose, UA Negative Negative mg/dL    Ketones, UA Negative Negative    Urobilinogen, UA Normal Normal, 0.2 E.U./dL    Bilirubin Negative Negative    Blood, UA Negative Negative    Lot Number [id number]     Expiration Date 4/2,025        Bladder Scan interpretation 08/09/2024    Estimation of residual urine via BVI 3000 Verathon Bladder Scan  MA/nurse performing: tylor collier Rn    Residual Urine: 0 ml  Indication: Benign prostatic hyperplasia, unspecified whether lower urinary tract symptoms present    Urinary urgency   Position: Supine  Examination: Incremental scanning of the suprapubic area using 2.0 MHz transducer using copious amounts of acoustic gel.   Findings: An anechoic area was demonstrated which represented the bladder, with measurement of residual urine as noted. I inspected this " myself. In that the residual urine was stable or insignificant, refer to plan for treatment and plan necessary at this time.            PSA          1/27/2024    09:08   PSA   PSA 1.170           Procedures        Assessment and Plan    Diagnoses and all orders for this visit:    1. Benign prostatic hyperplasia, unspecified whether lower urinary tract symptoms present (Primary)  -     Bladder Scan  -     POC Urinalysis Dipstick, Automated    2. Urinary urgency  -     Discontinue: Mirabegron ER (Myrbetriq) 25 MG tablet sustained-release 24 hour 24 hr tablet; Take 1 tablet by mouth Daily.  Dispense: 90 tablet; Refill: 3  -     Mirabegron ER (Myrbetriq) 25 MG tablet sustained-release 24 hour 24 hr tablet; Take 1 tablet by mouth Daily.  Dispense: 90 tablet; Refill: 3  -     POC Urinalysis Dipstick, Automated      We will try him on myrbetriq for OAB symptoms.     Will continue with tamsulosin.    We will f/u in 8 weeks or sooner if needed.       I spent 10 minutes caring for Prosper on this date of service. This time includes time spent by me in the following activities:reviewing tests, obtaining and/or reviewing a separately obtained history, performing a medically appropriate examination and/or evaluation , counseling and educating the patient/family/caregiver, ordering medications, tests, or procedures, and documenting information in the medical record  Follow Up   Return in about 8 weeks (around 10/4/2024) for f/u oab with P[facility] .  Patient was given instructions and counseling regarding his condition or for health maintenance advice. Please see specific information pulled into the AVS if appropriate.

## 2024-09-11 ENCOUNTER — OFFICE VISIT (OUTPATIENT)
Age: 59
End: 2024-09-11
Payer: COMMERCIAL

## 2024-09-11 VITALS
BODY MASS INDEX: 33.27 KG/M2 | SYSTOLIC BLOOD PRESSURE: 140 MMHG | HEIGHT: 67 IN | HEART RATE: 75 BPM | WEIGHT: 212 LBS | DIASTOLIC BLOOD PRESSURE: 78 MMHG

## 2024-09-11 DIAGNOSIS — I10 ESSENTIAL HYPERTENSION: ICD-10-CM

## 2024-09-11 DIAGNOSIS — Z95.1 S/P CABG X 5: ICD-10-CM

## 2024-09-11 DIAGNOSIS — I25.118 CORONARY ARTERY DISEASE OF NATIVE ARTERY OF NATIVE HEART WITH STABLE ANGINA PECTORIS: Primary | ICD-10-CM

## 2024-09-11 DIAGNOSIS — E78.5 HYPERLIPIDEMIA LDL GOAL <70: ICD-10-CM

## 2024-09-11 PROCEDURE — 99214 OFFICE O/P EST MOD 30 MIN: CPT | Performed by: INTERNAL MEDICINE

## 2024-09-11 NOTE — PROGRESS NOTES
Date of Office Visit: 24    Encounter Provider: Carlos Aparicio MD  Place of Service: [facility] CARDIOLOGY  Patient Name: Prosper Darling  :1965    Chief Complaint   Patient presents with    Coronary Artery Disease    S/P CABG    Follow-up     HPI: Prosper Darling is a 59 y.o. male with a medical history of essential hypertension, hyperlipidemia, tobacco abuse and coronary artery disease.  In 2020, he presented with unstable angina.  Ultimately he underwent CABG x 5 (mid LAD, STACIE to first OM, SVG to acute marginal branch of right coronary, SVG to RCA, SVG to first diagonal).  Ventricular systolic function remain normal.  Been tried on atorvastatin and rosuvastatin, however, stopped due to myalgias.  Patient was then on pravastatin which was increased due to lipid panel out of target range.  He states that he has had issues with bilateral hip pain and wonders if the cholesterol medication is contributing.  He previously has had vascular workup including a recent SARAY with no evidence of peripheral arterial disease.  He states when he walks greater than 50 feet his hips hurt and he has to stop.  His lipid panel is also been poorly controlled on the pravastatin therapy.    He denies any recent issues with chest pain or MAGANA.        Previous testing and notes have been reviewed by me.     Past Medical History:   Diagnosis Date    CAD (coronary artery disease)     Enlarged prostate     Erectile dysfunction     Fainting spell     GERD (gastroesophageal reflux disease)     Hyperlipidemia     Hypertension     Peripheral neuropathy     Slow to wake up after anesthesia     Unstable angina        Past Surgical History:   Procedure Laterality Date    APPENDECTOMY      CARDIAC CATHETERIZATION N/A 3/6/2020    Procedure: Coronary angiography, Left heart catheterization, Left ventricular angiography;  Surgeon: Carlos Aparicio MD;  Location: [facility] INVASIVE  LOCATION;  Service: Cardiology;  Laterality: N/A;    CARDIAC SURGERY      COLONOSCOPY N/A 8/20/2021    Procedure: COLONOSCOPY WITH POLYPECTOMY HOT SNARE;  Surgeon: Devon Plata MD;  Location: [facility] ENDOSCOPY;  Service: General;  Laterality: N/A;  COLON POLYP    CORONARY ARTERY BYPASS GRAFT N/A 3/11/2020    Procedure: SHARRI, MEDIAN STERNOTOMY, CORONARY ARTERY BYPASS GRAFTING X 5   UTILIZING MARIEL INTERNAL MAMMARY ARTERIES AND ENDOSCOPICALLY HARVESTED LEFT SAPHENOUS VEIN, PRP;  Surgeon: Jr Manuel Orourke MD;  Location: [facility] OR;  Service: Cardiothoracic;  Laterality: N/A;    ENDOSCOPY N/A 6/10/2024    Procedure: ESOPHAGOGASTRODUODENOSCOPY with biopsy;  Surgeon: Theresa Frey MD;  Location: [facility] ENDOSCOPY;  Service: Gastroenterology;  Laterality: N/A;  hiatal hernia, gastritis    MOUTH SURGERY         Social History     Socioeconomic History    Marital status:    Tobacco Use    Smoking status: Every Day     Current packs/day: 1.00     Average packs/day: 1 pack/day for 48.7 years (48.7 ttl pk-yrs)     Types: Cigarettes     Start date: 1976     Passive exposure: Current    Smokeless tobacco: Never   Vaping Use    Vaping status: Never Used   Substance and Sexual Activity    Alcohol use: Yes     Alcohol/week: 0.0 - 1.0 standard drinks of alcohol     Comment: occas.    Drug use: Never    Sexual activity: Defer       Family History   Problem Relation Age of Onset    Heart disease Mother     No Known Problems Father     Hepatitis Sister     Stroke Maternal Grandmother     Stroke Maternal Grandfather     Malig Hyperthermia Neg Hx        Review of Systems   Constitutional: Negative. Negative for fever and malaise/fatigue.   HENT: Negative.  Negative for nosebleeds and sore throat.    Eyes: Negative.  Negative for blurred vision and double vision.   Cardiovascular: Negative.  Negative for chest pain, claudication, palpitations and syncope.   Respiratory: Negative.  Negative for cough, shortness of  breath and snoring.    Endocrine: Negative.  Negative for cold intolerance, heat intolerance and polydipsia.   Hematologic/Lymphatic: Negative.    Skin: Negative.  Negative for itching, poor wound healing and rash.   Musculoskeletal: Negative.  Negative for joint pain, joint swelling, muscle weakness and myalgias.   Gastrointestinal: Negative.  Negative for abdominal pain, melena, nausea and vomiting.   Genitourinary: Negative.    Neurological: Negative.  Negative for light-headedness, loss of balance, seizures, vertigo and weakness.   Psychiatric/Behavioral: Negative.  Negative for altered mental status and depression.    Allergic/Immunologic: Negative.        Allergies   Allergen Reactions    Atorvastatin Myalgia    Rosuvastatin Myalgia         Current Outpatient Medications:     aspirin 81 MG EC tablet, Take 1 tablet by mouth Daily., Disp: 90 tablet, Rfl: 1    carvedilol (COREG) 6.25 MG tablet, Take 1 tablet by mouth 2 (Two) Times a Day., Disp: 180 tablet, Rfl: 3    cyanocobalamin 1000 MCG/ML injection, Inject 1 mL into the appropriate muscle as directed by prescriber Every 28 (Twenty-Eight) Days., Disp: 3 mL, Rfl: 3    ezetimibe (ZETIA) 10 MG tablet, Take 1 tablet by mouth Daily., Disp: 90 tablet, Rfl: 3    folic acid (FOLVITE) 1 MG tablet, Take 1 tablet by mouth Daily., Disp: 30 tablet, Rfl: 2    hydrALAZINE (APRESOLINE) 10 MG tablet, Take 1 tablet by mouth 3 (Three) Times a Day. (Patient taking differently: Take 1 tablet by mouth 3 (Three) Times a Day. Usually takes twice daily, forgets mid day dose), Disp: 270 tablet, Rfl: 1    losartan (COZAAR) 100 MG tablet, Take 1 tablet by mouth Daily., Disp: 90 tablet, Rfl: 3    Mirabegron ER (Myrbetriq) 25 MG tablet sustained-release 24 hour 24 hr tablet, Take 1 tablet by mouth Daily., Disp: 90 tablet, Rfl: 3    pantoprazole (PROTONIX) 20 MG EC tablet, Take 1 tablet by mouth Daily., Disp: 90 tablet, Rfl: 1    pravastatin (PRAVACHOL) 80 MG tablet, Take 1 tablet by mouth  "Daily., Disp: 90 tablet, Rfl: 3    tamsulosin (FLOMAX) 0.4 MG capsule 24 hr capsule, Take 2 capsules by mouth Daily., Disp: 180 capsule, Rfl: 4    traZODone (DESYREL) 50 MG tablet, Take 1 tablet by mouth Every Night. (Patient taking differently: Take 1 tablet by mouth Every Night. Not taken recently, out of it), Disp: 30 tablet, Rfl: 1      Objective:     Vitals:    09/11/24 1206   BP: 140/78   Pulse: 75   Weight: 96.2 kg (212 lb)   Height: 170.2 cm (67\")     Body mass index is 33.2 kg/m².       PHYSICAL EXAM:    Constitutional:       Appearance: Healthy appearance. Not in distress.   Neck:      Vascular: No JVR. JVD normal.   Pulmonary:      Effort: Pulmonary effort is normal.      Breath sounds: Normal breath sounds. No wheezing. No rhonchi. No rales.   Chest:      Chest wall: Not tender to palpatation.   Cardiovascular:      PMI at left midclavicular line. Normal rate. Regular rhythm. Normal S1. Normal S2.       Murmurs: There is no murmur.      No gallop.  No click. No rub.   Pulses:     Intact distal pulses.   Edema:     Peripheral edema absent.   Abdominal:      General: Bowel sounds are normal.      Palpations: Abdomen is soft.      Tenderness: There is no abdominal tenderness.   Musculoskeletal: Normal range of motion.         General: No tenderness. Skin:     General: Skin is warm and dry.   Neurological:      General: No focal deficit present.      Mental Status: Alert and oriented to person, place and time.         Procedures      Left Heart cath 3/6/2020:  1. Left main: Normal  2. LAD: Chronic total occlusion mid segment.  Small caliber diagonal branch with 80 to 90% proximal stenosis.  3. LCX: Discrete 80% proximal stenosis.  Discrete 70% proximal OM1 stenosis.  4. RCA: 60 to 70% proximal stenosis and discrete 70% distal stenosis  5.  Normal left ventricular size and systolic function     Recommendations: Evaluation for CABG      Assessment:      Plan:        Coronary artery disease: (LIMA to LAD, STACIE " to first OM, SVG to acute marginal branch of the right coronary, SVG to RCA, SVG to first diagonal) 03/2020.  Maintains normal LV systolic function    -Aspirin 81 mg p.o. daily.  Patient is also on carvedilol 6.25 mg p.o. every 12 hours.  He will monitor his blood pressure little more closely and we will consider whether needed increase the therapy    2.   Hypertension: Continue carvedilol at current dose for now.  He will monitor his blood pressure at home.  No change in losartan therapy.    3.  Hyperlipidemia goal LDL less than 70: Lipid panel has been out of target range.  I recommend that he actually come off the Pravachol for a month and then give me a call.  If he notices significant improvement in his hip pain with ambulation I would consider leaving him off of the Pravachol and we have had discussions regarding PCSK9 inhibitors.  I would recommend starting 1 at that time if that was the case.    4.  Tobacco abuse: Smoking cessation recommended         Your medication list            Accurate as of September 11, 2024 12:24 PM. If you have any questions, ask your nurse or doctor.                CHANGE how you take these medications        Instructions Last Dose Given Next Dose Due   hydrALAZINE 10 MG tablet  Commonly known as: APRESOLINE  What changed: additional instructions      Take 1 tablet by mouth 3 (Three) Times a Day.       traZODone 50 MG tablet  Commonly known as: DESYREL  What changed: additional instructions      Take 1 tablet by mouth Every Night.              CONTINUE taking these medications        Instructions Last Dose Given Next Dose Due   aspirin 81 MG EC tablet      Take 1 tablet by mouth Daily.       carvedilol 6.25 MG tablet  Commonly known as: COREG      Take 1 tablet by mouth 2 (Two) Times a Day.       cyanocobalamin 1000 MCG/ML injection      Inject 1 mL into the appropriate muscle as directed by prescriber Every 28 (Twenty-Eight) Days.       ezetimibe 10 MG tablet  Commonly known as:  ZETIA      Take 1 tablet by mouth Daily.       folic acid 1 MG tablet  Commonly known as: FOLVITE      Take 1 tablet by mouth Daily.       losartan 100 MG tablet  Commonly known as: COZAAR      Take 1 tablet by mouth Daily.       Mirabegron ER 25 MG tablet sustained-release 24 hour 24 hr tablet  Commonly known as: Myrbetriq      Take 1 tablet by mouth Daily.       pantoprazole 20 MG EC tablet  Commonly known as: PROTONIX      Take 1 tablet by mouth Daily.       pravastatin 80 MG tablet  Commonly known as: PRAVACHOL      Take 1 tablet by mouth Daily.       tamsulosin 0.4 MG capsule 24 hr capsule  Commonly known as: FLOMAX      Take 2 capsules by mouth Daily.                  As always, it has been a pleasure to participate in your patient's care.      Sincerely,       JAIME Oglesby

## 2024-09-16 DIAGNOSIS — E53.8 FOLIC ACID DEFICIENCY: ICD-10-CM

## 2024-09-16 DIAGNOSIS — G47.00 INSOMNIA, UNSPECIFIED TYPE: ICD-10-CM

## 2024-09-16 RX ORDER — FOLIC ACID 1 MG/1
1 TABLET ORAL DAILY
Qty: 30 TABLET | Refills: 2 | Status: SHIPPED | OUTPATIENT
Start: 2024-09-16

## 2024-09-16 RX ORDER — TRAZODONE HYDROCHLORIDE 50 MG/1
50 TABLET, FILM COATED ORAL NIGHTLY
Qty: 30 TABLET | Refills: 1 | Status: SHIPPED | OUTPATIENT
Start: 2024-09-16

## 2024-11-07 ENCOUNTER — OFFICE VISIT (OUTPATIENT)
Dept: FAMILY MEDICINE CLINIC | Facility: CLINIC | Age: 59
End: 2024-11-07
Payer: COMMERCIAL

## 2024-11-07 VITALS
HEART RATE: 101 BPM | BODY MASS INDEX: 32.91 KG/M2 | SYSTOLIC BLOOD PRESSURE: 130 MMHG | OXYGEN SATURATION: 96 % | DIASTOLIC BLOOD PRESSURE: 80 MMHG | TEMPERATURE: 97.2 F | WEIGHT: 210.1 LBS

## 2024-11-07 DIAGNOSIS — F32.A ANXIETY AND DEPRESSION: Primary | ICD-10-CM

## 2024-11-07 DIAGNOSIS — F41.9 ANXIETY AND DEPRESSION: Primary | ICD-10-CM

## 2024-11-07 NOTE — PROGRESS NOTES
Chief Complaint  Anxiety (Lost Wife 2/2023), Depression (Needs [place] paperwork completed), and Insomnia    Subjective          Prosper Darling presents to [facility]  History of Present Illness  Will void visit- he has to get info from his counselors since they are recommending for him to be off work               Objective   Allergies   Allergen Reactions    Atorvastatin Myalgia    Rosuvastatin Myalgia     Immunization History   Administered Date(s) Administered    Tdap 04/30/2024     Past Medical History:   Diagnosis Date    CAD (coronary artery disease)     Enlarged prostate     Erectile dysfunction     Fainting spell     GERD (gastroesophageal reflux disease)     Hyperlipidemia     Hypertension     Peripheral neuropathy     Slow to wake up after anesthesia     Unstable angina       Past Surgical History:   Procedure Laterality Date    APPENDECTOMY      CARDIAC CATHETERIZATION N/A 3/6/2020    Procedure: Coronary angiography, Left heart catheterization, Left ventricular angiography;  Surgeon: Carlos Aparicio MD;  Location: [facility] INVASIVE LOCATION;  Service: Cardiology;  Laterality: N/A;    CARDIAC SURGERY      COLONOSCOPY N/A 8/20/2021    Procedure: COLONOSCOPY WITH POLYPECTOMY HOT SNARE;  Surgeon: Devon Plata MD;  Location: [facility] ENDOSCOPY;  Service: General;  Laterality: N/A;  COLON POLYP    CORONARY ARTERY BYPASS GRAFT N/A 3/11/2020    Procedure: SHARRI, MEDIAN STERNOTOMY, CORONARY ARTERY BYPASS GRAFTING X 5   UTILIZING MARIEL INTERNAL MAMMARY ARTERIES AND ENDOSCOPICALLY HARVESTED LEFT SAPHENOUS VEIN, PRP;  Surgeon: Jr Manuel Orourke MD;  Location: [facility] OR;  Service: Cardiothoracic;  Laterality: N/A;    ENDOSCOPY N/A 6/10/2024    Procedure: ESOPHAGOGASTRODUODENOSCOPY with biopsy;  Surgeon: Theresa Frey MD;  Location: [facility] ENDOSCOPY;  Service: Gastroenterology;  Laterality: N/A;  hiatal hernia, gastritis    MOUTH SURGERY        Social History      Socioeconomic History    Marital status:    Tobacco Use    Smoking status: Every Day     Current packs/day: 1.00     Average packs/day: 1 pack/day for 48.8 years (48.8 ttl pk-yrs)     Types: Cigarettes     Start date: 1976     Passive exposure: Current    Smokeless tobacco: Never   Vaping Use    Vaping status: Never Used   Substance and Sexual Activity    Alcohol use: Yes     Alcohol/week: 0.0 - 1.0 standard drinks of alcohol     Comment: occas.    Drug use: Never    Sexual activity: Defer        Current Outpatient Medications:     aspirin 81 MG EC tablet, Take 1 tablet by mouth Daily., Disp: 90 tablet, Rfl: 1    carvedilol (COREG) 6.25 MG tablet, Take 1 tablet by mouth 2 (Two) Times a Day., Disp: 180 tablet, Rfl: 3    ezetimibe (ZETIA) 10 MG tablet, Take 1 tablet by mouth Daily., Disp: 90 tablet, Rfl: 3    hydrALAZINE (APRESOLINE) 10 MG tablet, Take 1 tablet by mouth 3 (Three) Times a Day. (Patient taking differently: Take 1 tablet by mouth 3 (Three) Times a Day. Usually takes twice daily, forgets mid day dose), Disp: 270 tablet, Rfl: 1    losartan (COZAAR) 100 MG tablet, Take 1 tablet by mouth Daily., Disp: 90 tablet, Rfl: 3    pantoprazole (PROTONIX) 20 MG EC tablet, Take 1 tablet by mouth Daily., Disp: 90 tablet, Rfl: 1    tamsulosin (FLOMAX) 0.4 MG capsule 24 hr capsule, Take 2 capsules by mouth Daily., Disp: 180 capsule, Rfl: 4   Family History   Problem Relation Age of Onset    Heart disease Mother     No Known Problems Father     Hepatitis Sister     Stroke Maternal Grandmother     Stroke Maternal Grandfather     Malig Hyperthermia Neg Hx           Vital Signs:   Vitals:    11/07/24 0943   BP: 130/80   Pulse: 101   Temp: 97.2 °F (36.2 °C)   SpO2: 96%   Weight: 95.3 kg (210 lb 1.6 oz)       Review of Systems   Physical Exam   Result Review :                 Assessment and Plan    There are no diagnoses linked to this encounter.        Follow Up   No follow-ups on file.  Patient was given  instructions and counseling regarding his condition or for health maintenance advice. Please see specific information pulled into the AVS if appropriate.

## 2024-11-09 DIAGNOSIS — I10 PRIMARY HYPERTENSION: ICD-10-CM

## 2024-11-11 RX ORDER — HYDRALAZINE HYDROCHLORIDE 10 MG/1
10 TABLET, FILM COATED ORAL 3 TIMES DAILY
Qty: 270 TABLET | Refills: 1 | OUTPATIENT
Start: 2024-11-11

## 2024-11-18 ENCOUNTER — OFFICE VISIT (OUTPATIENT)
Dept: FAMILY MEDICINE CLINIC | Facility: CLINIC | Age: 59
End: 2024-11-18
Payer: COMMERCIAL

## 2024-11-18 VITALS
WEIGHT: 208.1 LBS | SYSTOLIC BLOOD PRESSURE: 135 MMHG | TEMPERATURE: 97.4 F | DIASTOLIC BLOOD PRESSURE: 85 MMHG | BODY MASS INDEX: 32.59 KG/M2 | OXYGEN SATURATION: 96 % | HEART RATE: 75 BPM

## 2024-11-18 DIAGNOSIS — R73.03 PREDIABETES: Primary | ICD-10-CM

## 2024-11-18 DIAGNOSIS — Z00.00 HEALTHCARE MAINTENANCE: ICD-10-CM

## 2024-11-18 DIAGNOSIS — K21.9 GASTROESOPHAGEAL REFLUX DISEASE, UNSPECIFIED WHETHER ESOPHAGITIS PRESENT: ICD-10-CM

## 2024-11-18 DIAGNOSIS — E78.5 HYPERLIPIDEMIA LDL GOAL <70: ICD-10-CM

## 2024-11-18 DIAGNOSIS — I10 PRIMARY HYPERTENSION: ICD-10-CM

## 2024-11-18 LAB
ALBUMIN SERPL-MCNC: 4 G/DL (ref 3.5–5.2)
ALBUMIN/GLOB SERPL: 1.4 G/DL
ALP SERPL-CCNC: 65 U/L (ref 39–117)
ALT SERPL W P-5'-P-CCNC: 22 U/L (ref 1–41)
ANION GAP SERPL CALCULATED.3IONS-SCNC: 9.9 MMOL/L (ref 5–15)
AST SERPL-CCNC: 21 U/L (ref 1–40)
BASOPHILS # BLD AUTO: 0.03 10*3/MM3 (ref 0–0.2)
BASOPHILS NFR BLD AUTO: 0.4 % (ref 0–1.5)
BILIRUB SERPL-MCNC: 1.1 MG/DL (ref 0–1.2)
BUN SERPL-MCNC: 13 MG/DL (ref 8–23)
BUN/CREAT SERPL: 11.9 (ref 7–25)
CALCIUM SPEC-SCNC: 9.2 MG/DL (ref 8.6–10.5)
CHLORIDE SERPL-SCNC: 106 MMOL/L (ref 98–107)
CHOLEST SERPL-MCNC: 253 MG/DL (ref 0–200)
CO2 SERPL-SCNC: 24.1 MMOL/L (ref 22–29)
CREAT SERPL-MCNC: 1.09 MG/DL (ref 0.76–1.27)
DEPRECATED RDW RBC AUTO: 40.1 FL (ref 37–54)
EGFRCR SERPLBLD CKD-EPI 2021: 77.2 ML/MIN/1.73
EOSINOPHIL # BLD AUTO: 0.1 10*3/MM3 (ref 0–0.4)
EOSINOPHIL NFR BLD AUTO: 1.2 % (ref 0.3–6.2)
ERYTHROCYTE [DISTWIDTH] IN BLOOD BY AUTOMATED COUNT: 13 % (ref 12.3–15.4)
GLOBULIN UR ELPH-MCNC: 2.8 GM/DL
GLUCOSE SERPL-MCNC: 91 MG/DL (ref 65–99)
HBA1C MFR BLD: 5.7 % (ref 4.8–5.6)
HCT VFR BLD AUTO: 45.2 % (ref 37.5–51)
HDLC SERPL-MCNC: 29 MG/DL (ref 40–60)
HGB BLD-MCNC: 15.3 G/DL (ref 13–17.7)
IMM GRANULOCYTES # BLD AUTO: 0.01 10*3/MM3 (ref 0–0.05)
IMM GRANULOCYTES NFR BLD AUTO: 0.1 % (ref 0–0.5)
LDLC SERPL CALC-MCNC: 178 MG/DL (ref 0–100)
LDLC/HDLC SERPL: 6.06 {RATIO}
LYMPHOCYTES # BLD AUTO: 2.39 10*3/MM3 (ref 0.7–3.1)
LYMPHOCYTES NFR BLD AUTO: 29.5 % (ref 19.6–45.3)
MCH RBC QN AUTO: 29 PG (ref 26.6–33)
MCHC RBC AUTO-ENTMCNC: 33.8 G/DL (ref 31.5–35.7)
MCV RBC AUTO: 85.6 FL (ref 79–97)
MONOCYTES # BLD AUTO: 0.64 10*3/MM3 (ref 0.1–0.9)
MONOCYTES NFR BLD AUTO: 7.9 % (ref 5–12)
NEUTROPHILS NFR BLD AUTO: 4.94 10*3/MM3 (ref 1.7–7)
NEUTROPHILS NFR BLD AUTO: 60.9 % (ref 42.7–76)
NRBC BLD AUTO-RTO: 0 /100 WBC (ref 0–0.2)
PLATELET # BLD AUTO: 265 10*3/MM3 (ref 140–450)
PMV BLD AUTO: 9.6 FL (ref 6–12)
POTASSIUM SERPL-SCNC: 4.6 MMOL/L (ref 3.5–5.2)
PROT SERPL-MCNC: 6.8 G/DL (ref 6–8.5)
RBC # BLD AUTO: 5.28 10*6/MM3 (ref 4.14–5.8)
SODIUM SERPL-SCNC: 140 MMOL/L (ref 136–145)
T4 FREE SERPL-MCNC: 1.01 NG/DL (ref 0.92–1.68)
TRIGL SERPL-MCNC: 241 MG/DL (ref 0–150)
TSH SERPL DL<=0.05 MIU/L-ACNC: 2.1 UIU/ML (ref 0.27–4.2)
VLDLC SERPL-MCNC: 46 MG/DL (ref 5–40)
WBC NRBC COR # BLD AUTO: 8.11 10*3/MM3 (ref 3.4–10.8)

## 2024-11-18 PROCEDURE — 83036 HEMOGLOBIN GLYCOSYLATED A1C: CPT | Performed by: FAMILY MEDICINE

## 2024-11-18 PROCEDURE — 99214 OFFICE O/P EST MOD 30 MIN: CPT | Performed by: FAMILY MEDICINE

## 2024-11-18 PROCEDURE — 84439 ASSAY OF FREE THYROXINE: CPT | Performed by: FAMILY MEDICINE

## 2024-11-18 PROCEDURE — 80061 LIPID PANEL: CPT | Performed by: FAMILY MEDICINE

## 2024-11-18 PROCEDURE — 80050 GENERAL HEALTH PANEL: CPT | Performed by: FAMILY MEDICINE

## 2024-11-18 RX ORDER — PANTOPRAZOLE SODIUM 20 MG/1
20 TABLET, DELAYED RELEASE ORAL DAILY
Qty: 90 TABLET | Refills: 1 | Status: SHIPPED | OUTPATIENT
Start: 2024-11-18

## 2024-11-18 RX ORDER — ASPIRIN 81 MG/1
81 TABLET ORAL DAILY
Qty: 90 TABLET | Refills: 1 | Status: SHIPPED | OUTPATIENT
Start: 2024-11-18

## 2024-11-18 RX ORDER — HYDRALAZINE HYDROCHLORIDE 10 MG/1
10 TABLET, FILM COATED ORAL 3 TIMES DAILY
Qty: 270 TABLET | Refills: 1 | Status: SHIPPED | OUTPATIENT
Start: 2024-11-18

## 2024-11-18 NOTE — PROGRESS NOTES
Chief Complaint  Hypertension and Hyperlipidemia    Subjective          Prosper Darling presents to [facility]  Hypertension  This is a chronic problem. The current episode started more than 1 year ago. The problem has been improved since onset. The problem is controlled. Pertinent negatives include no anxiety, blurred vision, chest pain, headaches, malaise/fatigue, neck pain, orthopnea, palpitations, peripheral edema, PND, shortness of breath or sweats. There are no associated agents to hypertension. Risk factors for coronary artery disease include dyslipidemia, family history and male gender. Current antihypertension treatment includes angiotensin blockers, direct vasodilators and beta blockers. The current treatment provides significant improvement. There are no compliance problems.    Hyperlipidemia  This is a chronic problem. The current episode started more than 1 year ago. The problem is controlled. Recent lipid tests were reviewed and are variable. There are no known factors aggravating his hyperlipidemia. Pertinent negatives include no chest pain, focal sensory loss, focal weakness, leg pain, myalgias or shortness of breath. Current antihyperlipidemic treatment includes ezetimibe. The current treatment provides significant improvement of lipids. There are no compliance problems.                 Objective   Allergies   Allergen Reactions    Atorvastatin Myalgia    Rosuvastatin Myalgia     Immunization History   Administered Date(s) Administered    Tdap 04/30/2024     Past Medical History:   Diagnosis Date    CAD (coronary artery disease)     Enlarged prostate     Erectile dysfunction     Fainting spell     GERD (gastroesophageal reflux disease)     Hyperlipidemia     Hypertension     Peripheral neuropathy     Slow to wake up after anesthesia     Unstable angina       Past Surgical History:   Procedure Laterality Date    APPENDECTOMY      CARDIAC CATHETERIZATION N/A 3/6/2020     Procedure: Coronary angiography, Left heart catheterization, Left ventricular angiography;  Surgeon: Carlos Aparicio MD;  Location: [facility] CATH INVASIVE LOCATION;  Service: Cardiology;  Laterality: N/A;    CARDIAC SURGERY      COLONOSCOPY N/A 8/20/2021    Procedure: COLONOSCOPY WITH POLYPECTOMY HOT SNARE;  Surgeon: Devon Plata MD;  Location: [facility] ENDOSCOPY;  Service: General;  Laterality: N/A;  COLON POLYP    CORONARY ARTERY BYPASS GRAFT N/A 3/11/2020    Procedure: SHARRI, MEDIAN STERNOTOMY, CORONARY ARTERY BYPASS GRAFTING X 5   UTILIZING MARIEL INTERNAL MAMMARY ARTERIES AND ENDOSCOPICALLY HARVESTED LEFT SAPHENOUS VEIN, PRP;  Surgeon: Jr Manuel Orourke MD;  Location: [facility] MAIN OR;  Service: Cardiothoracic;  Laterality: N/A;    ENDOSCOPY N/A 6/10/2024    Procedure: ESOPHAGOGASTRODUODENOSCOPY with biopsy;  Surgeon: Theresa Frey MD;  Location: [facility] ENDOSCOPY;  Service: Gastroenterology;  Laterality: N/A;  hiatal hernia, gastritis    MOUTH SURGERY        Social History     Socioeconomic History    Marital status:    Tobacco Use    Smoking status: Every Day     Current packs/day: 1.00     Average packs/day: 1 pack/day for 48.9 years (48.9 ttl pk-yrs)     Types: Cigarettes     Start date: 1976     Passive exposure: Current    Smokeless tobacco: Never   Vaping Use    Vaping status: Never Used   Substance and Sexual Activity    Alcohol use: Yes     Alcohol/week: 0.0 - 1.0 standard drinks of alcohol     Comment: occas.    Drug use: Never    Sexual activity: Defer        Current Outpatient Medications:     aspirin 81 MG EC tablet, Take 1 tablet by mouth Daily., Disp: 90 tablet, Rfl: 1    carvedilol (COREG) 6.25 MG tablet, Take 1 tablet by mouth 2 (Two) Times a Day., Disp: 180 tablet, Rfl: 3    ezetimibe (ZETIA) 10 MG tablet, Take 1 tablet by mouth Daily., Disp: 90 tablet, Rfl: 3    hydrALAZINE (APRESOLINE) 10 MG tablet, Take 1 tablet by mouth 3 (Three) Times a Day., Disp: 270 tablet, Rfl:  1    losartan (COZAAR) 100 MG tablet, Take 1 tablet by mouth Daily., Disp: 90 tablet, Rfl: 3    pantoprazole (PROTONIX) 20 MG EC tablet, Take 1 tablet by mouth Daily., Disp: 90 tablet, Rfl: 1    tamsulosin (FLOMAX) 0.4 MG capsule 24 hr capsule, Take 2 capsules by mouth Daily., Disp: 180 capsule, Rfl: 4   Family History   Problem Relation Age of Onset    Heart disease Mother     No Known Problems Father     Hepatitis Sister     Stroke Maternal Grandmother     Stroke Maternal Grandfather     Malig Hyperthermia Neg Hx           Vital Signs:   Vitals:    11/18/24 1054 11/18/24 1109   BP: 140/90 135/85   Pulse: 75    Temp: 97.4 °F (36.3 °C)    SpO2: 96%    Weight: 94.4 kg (208 lb 1.6 oz)        Review of Systems   Constitutional:  Negative for fatigue, fever and malaise/fatigue.   HENT:  Negative for sore throat.    Eyes:  Negative for blurred vision and visual disturbance.   Respiratory:  Negative for cough, chest tightness, shortness of breath and wheezing.    Cardiovascular:  Negative for chest pain, palpitations, orthopnea, leg swelling and PND.   Gastrointestinal:  Negative for abdominal pain, diarrhea, nausea and vomiting.   Musculoskeletal:  Negative for myalgias and neck pain.   Neurological:  Negative for dizziness, focal weakness, light-headedness and headaches.      Physical Exam   Result Review :   The following data was reviewed by: Stuart Alva MD on 11/18/2024:  CMP          1/27/2024    09:08 4/30/2024    15:56 5/31/2024    14:56   CMP   Glucose 94  82  81    BUN 14  11  15    Creatinine 1.05  1.03  0.98    EGFR 81.3  82.6  87.7    Sodium 143  140  138    Potassium 4.5  3.9  4.0    Chloride 106  104  100    Calcium 9.6  9.6  9.3    Total Protein 6.9  7.3  7.1    Albumin 4.5  4.3  4.3    Globulin 2.4  3.0  2.8    Total Bilirubin 0.9  0.9  0.7    Alkaline Phosphatase 67  67  71    AST (SGOT) 25  22  16    ALT (SGPT) 34  23  18    Albumin/Globulin Ratio 1.9  1.4  1.5    BUN/Creatinine Ratio 13.3  10.7   15.3    Anion Gap 11.0  9.3  11.0      CBC          1/27/2024    09:08 4/30/2024    15:56   CBC   WBC 7.70  8.34    RBC 5.32  5.16    Hemoglobin 15.0  15.2    Hematocrit 46.1  45.5    MCV 86.7  88.2    MCH 28.2  29.5    MCHC 32.5  33.4    RDW 13.1  13.0    Platelets 305  310      Lipid Panel          1/27/2024    09:08   Lipid Panel   Total Cholesterol 232    Triglycerides 223    HDL Cholesterol 31    VLDL Cholesterol 42    LDL Cholesterol  159    LDL/HDL Ratio 5.05      TSH          1/27/2024    09:08 4/30/2024    15:56   TSH   TSH 0.870  1.880      Most Recent A1C          4/30/2024    15:56   HGBA1C Most Recent   Hemoglobin A1C 5.60      PSA          1/27/2024    09:08   PSA   PSA 1.170                Assessment and Plan    Diagnoses and all orders for this visit:    1. Prediabetes (Primary)  -     Hemoglobin A1c    2. Hyperlipidemia LDL goal <70  -     Lipid Panel  -     Lipid Panel    3. Primary hypertension  -     hydrALAZINE (APRESOLINE) 10 MG tablet; Take 1 tablet by mouth 3 (Three) Times a Day.  Dispense: 270 tablet; Refill: 1  -     CBC Auto Differential  -     Comprehensive Metabolic Panel  -     TSH+Free T4    4. Gastroesophageal reflux disease, unspecified whether esophagitis present  -     pantoprazole (PROTONIX) 20 MG EC tablet; Take 1 tablet by mouth Daily.  Dispense: 90 tablet; Refill: 1    5. Healthcare maintenance  -     aspirin 81 MG EC tablet; Take 1 tablet by mouth Daily.  Dispense: 90 tablet; Refill: 1            Follow Up   Return in about 6 months (around 5/18/2025) for Recheck.  Patient was given instructions and counseling regarding his condition or for health maintenance advice. Please see specific information pulled into the AVS if appropriate.

## 2024-11-18 NOTE — PROGRESS NOTES
Venipuncture Blood Specimen Collection  Venipuncture performed in LEFT ARM by Angelina Wilson with good hemostasis. Patient tolerated the procedure well without complications.   11/18/24   Angelina Wilson

## 2024-12-03 ENCOUNTER — OFFICE VISIT (OUTPATIENT)
Dept: UROLOGY | Age: 59
End: 2024-12-03
Payer: COMMERCIAL

## 2024-12-03 VITALS — HEIGHT: 67 IN | WEIGHT: 208 LBS | BODY MASS INDEX: 32.65 KG/M2

## 2024-12-03 DIAGNOSIS — N40.0 BENIGN PROSTATIC HYPERPLASIA, UNSPECIFIED WHETHER LOWER URINARY TRACT SYMPTOMS PRESENT: Primary | ICD-10-CM

## 2024-12-03 LAB
BILIRUB BLD-MCNC: NEGATIVE MG/DL
CLARITY, POC: CLEAR
COLOR UR: YELLOW
EXPIRATION DATE: ABNORMAL
GLUCOSE UR STRIP-MCNC: NEGATIVE MG/DL
KETONES UR QL: NEGATIVE
LEUKOCYTE EST, POC: NEGATIVE
Lab: ABNORMAL
NITRITE UR-MCNC: NEGATIVE MG/ML
PH UR: 6 [PH] (ref 5–8)
PROT UR STRIP-MCNC: ABNORMAL MG/DL
RBC # UR STRIP: NEGATIVE /UL
SP GR UR: 1.03 (ref 1–1.03)
URINE VOLUME: 0
UROBILINOGEN UR QL: ABNORMAL

## 2024-12-03 PROCEDURE — 99213 OFFICE O/P EST LOW 20 MIN: CPT | Performed by: NURSE PRACTITIONER

## 2024-12-03 PROCEDURE — 81003 URINALYSIS AUTO W/O SCOPE: CPT | Performed by: NURSE PRACTITIONER

## 2024-12-03 PROCEDURE — 51798 US URINE CAPACITY MEASURE: CPT | Performed by: NURSE PRACTITIONER

## 2024-12-03 NOTE — PROGRESS NOTES
Chief Complaint: Benign Prostatic Hypertrophy    Subjective         Benign Prostatic Hypertrophy    Primary Care Follow-Up    Prosper Darling is a 59 y.o. male presents to [facility] UROLOG[facility] to be seen for follow-up on BPH.    At last visit we started him on myrbetriq.     He states he did not get this prescription from [facility] and never took this.     His Pcp marked him not taking this on 11/7/24 .    He remains on tamsulosin 0.8mg q day.    He states that he is not as much of an issue as he is not having to hold it for longer periods of time.     He states he can deal with the symptoms better.     Previous:     The patient has been on tamsulosin 0.8 mg daily for over a year.    He states that over the last year he has been with worsening issues with urinary urgency and frequency.     He states he is voiding every 1-1.5 hours with extreme urge.    He is with nocturia x  0    Stream is good.     He denies straining or hesitancy.         Objective     Past Medical History:   Diagnosis Date    CAD (coronary artery disease)     Enlarged prostate     Erectile dysfunction     Fainting spell     GERD (gastroesophageal reflux disease)     Hyperlipidemia     Hypertension     Peripheral neuropathy     Slow to wake up after anesthesia     Unstable angina        Past Surgical History:   Procedure Laterality Date    APPENDECTOMY      CARDIAC CATHETERIZATION N/A 3/6/2020    Procedure: Coronary angiography, Left heart catheterization, Left ventricular angiography;  Surgeon: Carlos Aparicio MD;  Location: [facility] CATH INVASIVE LOCATION;  Service: Cardiology;  Laterality: N/A;    CARDIAC SURGERY      COLONOSCOPY N/A 8/20/2021    Procedure: COLONOSCOPY WITH POLYPECTOMY HOT SNARE;  Surgeon: Devon Plata MD;  Location: [facility] ENDOSCOPY;  Service: General;  Laterality: N/A;  COLON POLYP    CORONARY ARTERY BYPASS GRAFT N/A 3/11/2020    Procedure: SHARRI, MEDIAN STERNOTOMY, CORONARY ARTERY BYPASS  GRAFTING X 5   UTILIZING MARIEL INTERNAL MAMMARY ARTERIES AND ENDOSCOPICALLY HARVESTED LEFT SAPHENOUS VEIN, PRP;  Surgeon: Jr Manuel Orourke MD;  Location: [facility] MAIN OR;  Service: Cardiothoracic;  Laterality: N/A;    ENDOSCOPY N/A 6/10/2024    Procedure: ESOPHAGOGASTRODUODENOSCOPY with biopsy;  Surgeon: Theresa Frey MD;  Location: [facility] ENDOSCOPY;  Service: Gastroenterology;  Laterality: N/A;  hiatal hernia, gastritis    MOUTH SURGERY           Current Outpatient Medications:     aspirin 81 MG EC tablet, Take 1 tablet by mouth Daily., Disp: 90 tablet, Rfl: 1    carvedilol (COREG) 6.25 MG tablet, Take 1 tablet by mouth 2 (Two) Times a Day., Disp: 180 tablet, Rfl: 3    ezetimibe (ZETIA) 10 MG tablet, Take 1 tablet by mouth Daily., Disp: 90 tablet, Rfl: 3    hydrALAZINE (APRESOLINE) 10 MG tablet, Take 1 tablet by mouth 3 (Three) Times a Day., Disp: 270 tablet, Rfl: 1    losartan (COZAAR) 100 MG tablet, Take 1 tablet by mouth Daily., Disp: 90 tablet, Rfl: 3    pantoprazole (PROTONIX) 20 MG EC tablet, Take 1 tablet by mouth Daily., Disp: 90 tablet, Rfl: 1    tamsulosin (FLOMAX) 0.4 MG capsule 24 hr capsule, Take 2 capsules by mouth Daily., Disp: 180 capsule, Rfl: 4    Allergies   Allergen Reactions    Atorvastatin Myalgia    Rosuvastatin Myalgia        Family History   Problem Relation Age of Onset    Heart disease Mother     No Known Problems Father     Hepatitis Sister     Stroke Maternal Grandmother     Stroke Maternal Grandfather     Malig Hyperthermia Neg Hx        Social History     Socioeconomic History    Marital status:    Tobacco Use    Smoking status: Every Day     Current packs/day: 1.00     Average packs/day: 1 pack/day for 48.9 years (48.9 ttl pk-yrs)     Types: Cigarettes     Start date: 1976     Passive exposure: Current    Smokeless tobacco: Never   Vaping Use    Vaping status: Never Used   Substance and Sexual Activity    Alcohol use: Yes     Alcohol/week: 0.0 - 1.0 standard drinks  "of alcohol     Comment: occas.    Drug use: Never    Sexual activity: Defer       Vital Signs:   Ht 170.2 cm (67\")   Wt 94.3 kg (208 lb)   BMI 32.58 kg/m²      Physical Exam     Result Review :   The following data was reviewed by: JAIME De La Cruz on 12/03/2024:  Results for orders placed or performed in visit on 12/03/24   POC Urinalysis Dipstick, Automated    Collection Time: 12/03/24  9:52 AM    Specimen: Urine   Result Value Ref Range    Color Yellow Yellow, Straw, Dark Yellow, Ana M    Clarity, UA Clear Clear    Specific Gravity  1.030 1.005 - 1.030    pH, Urine 6.0 5.0 - 8.0    Leukocytes Negative Negative    Nitrite, UA Negative Negative    Protein, POC 30 mg/dL (A) Negative mg/dL    Glucose, UA Negative Negative mg/dL    Ketones, UA Negative Negative    Urobilinogen, UA 0.2 E.U./dL Normal, 0.2 E.U./dL    Bilirubin Negative Negative    Blood, UA Negative Negative    Lot Number -     Expiration Date -    Bladder Scan    Collection Time: 12/03/24  9:53 AM   Result Value Ref Range    Urine Volume 0        Bladder Scan interpretation 08/09/2024    Estimation of residual urine via BVI 3000 Verathon Bladder Scan  MA/nurse performing: tylor collier Rn    Residual Urine: 0 ml  Indication: Benign prostatic hyperplasia, unspecified whether lower urinary tract symptoms present   Position: Supine  Examination: Incremental scanning of the suprapubic area using 2.0 MHz transducer using copious amounts of acoustic gel.   Findings: An anechoic area was demonstrated which represented the bladder, with measurement of residual urine as noted. I inspected this myself. In that the residual urine was stable or insignificant, refer to plan for treatment and plan necessary at this time.            PSA          1/27/2024    09:08   PSA   PSA 1.170           Procedures        Assessment and Plan    Diagnoses and all orders for this visit:    1. Benign prostatic hyperplasia, unspecified whether lower urinary tract symptoms present " (Primary)  -     POC Urinalysis Dipstick, Automated  -     Bladder Scan        Will continue with tamsulosin.    Discussed that we could try another medication after discussion of side effect profile of anticholinergics as well as potential side effect of myrbetriq he would not like to proceed with more meds.     He is able to deal with the symptoms for now.      I spent 10 minutes caring for Prosper on this date of service. This time includes time spent by me in the following activities:reviewing tests, obtaining and/or reviewing a separately obtained history, performing a medically appropriate examination and/or evaluation , counseling and educating the patient/family/caregiver, ordering medications, tests, or procedures, and documenting information in the medical record  Follow Up   Return in about 6 months (around 6/3/2025) for f/u oasb/ boh  with pVr .  Patient was given instructions and counseling regarding his condition or for health maintenance advice. Please see specific information pulled into the AVS if appropriate.

## 2024-12-05 ENCOUNTER — OFFICE VISIT (OUTPATIENT)
Dept: FAMILY MEDICINE CLINIC | Facility: CLINIC | Age: 59
End: 2024-12-05
Payer: COMMERCIAL

## 2024-12-05 VITALS
HEART RATE: 104 BPM | BODY MASS INDEX: 33.2 KG/M2 | SYSTOLIC BLOOD PRESSURE: 140 MMHG | DIASTOLIC BLOOD PRESSURE: 70 MMHG | TEMPERATURE: 98.2 F | OXYGEN SATURATION: 97 % | WEIGHT: 212 LBS

## 2024-12-05 DIAGNOSIS — M25.50 ARTHRALGIA, UNSPECIFIED JOINT: ICD-10-CM

## 2024-12-05 DIAGNOSIS — E78.2 MIXED HYPERLIPIDEMIA: Primary | ICD-10-CM

## 2024-12-05 DIAGNOSIS — R73.03 PREDIABETES: ICD-10-CM

## 2024-12-05 PROCEDURE — 84550 ASSAY OF BLOOD/URIC ACID: CPT | Performed by: FAMILY MEDICINE

## 2024-12-05 PROCEDURE — 86431 RHEUMATOID FACTOR QUANT: CPT | Performed by: FAMILY MEDICINE

## 2024-12-05 PROCEDURE — 86038 ANTINUCLEAR ANTIBODIES: CPT | Performed by: FAMILY MEDICINE

## 2024-12-05 PROCEDURE — 86200 CCP ANTIBODY: CPT | Performed by: FAMILY MEDICINE

## 2024-12-05 NOTE — PROGRESS NOTES
..  Venipuncture Blood Specimen Collection  Venipuncture performed in Lt arm by Adriana Estrada MA with good hemostasis. Patient tolerated the procedure well without complications.   12/05/24   Adriana Estrada MA

## 2024-12-05 NOTE — PROGRESS NOTES
Chief Complaint  Prediabetes and Hyperlipidemia    Subjective          Prosper Darling presents to [facility]  History of Present Illness  Discussed labs  Hyperlipidemia- pt cannot tolerate statins due to muscle pain- recommended repatha- pt refuses and will do diet and exercise- he says that he will get serious on this               Objective   Allergies   Allergen Reactions    Atorvastatin Myalgia    Rosuvastatin Myalgia     Immunization History   Administered Date(s) Administered    Tdap 04/30/2024     Past Medical History:   Diagnosis Date    CAD (coronary artery disease)     Enlarged prostate     Erectile dysfunction     Fainting spell     GERD (gastroesophageal reflux disease)     Hyperlipidemia     Hypertension     Peripheral neuropathy     Slow to wake up after anesthesia     Unstable angina       Past Surgical History:   Procedure Laterality Date    APPENDECTOMY      CARDIAC CATHETERIZATION N/A 3/6/2020    Procedure: Coronary angiography, Left heart catheterization, Left ventricular angiography;  Surgeon: Carlos Aparicio MD;  Location: [facility] CATH INVASIVE LOCATION;  Service: Cardiology;  Laterality: N/A;    CARDIAC SURGERY      COLONOSCOPY N/A 8/20/2021    Procedure: COLONOSCOPY WITH POLYPECTOMY HOT SNARE;  Surgeon: Devon Plata MD;  Location: [facility] ENDOSCOPY;  Service: General;  Laterality: N/A;  COLON POLYP    CORONARY ARTERY BYPASS GRAFT N/A 3/11/2020    Procedure: SHARRI, MEDIAN STERNOTOMY, CORONARY ARTERY BYPASS GRAFTING X 5   UTILIZING MARIEL INTERNAL MAMMARY ARTERIES AND ENDOSCOPICALLY HARVESTED LEFT SAPHENOUS VEIN, PRP;  Surgeon: Jr Manuel Orourke MD;  Location: [facility] OR;  Service: Cardiothoracic;  Laterality: N/A;    ENDOSCOPY N/A 6/10/2024    Procedure: ESOPHAGOGASTRODUODENOSCOPY with biopsy;  Surgeon: Theresa Frey MD;  Location: [facility] ENDOSCOPY;  Service: Gastroenterology;  Laterality: N/A;  hiatal hernia, gastritis    MOUTH SURGERY         Social History     Socioeconomic History    Marital status:    Tobacco Use    Smoking status: Every Day     Current packs/day: 1.00     Average packs/day: 1 pack/day for 48.9 years (48.9 ttl pk-yrs)     Types: Cigarettes     Start date: 1976     Passive exposure: Current    Smokeless tobacco: Never   Vaping Use    Vaping status: Never Used   Substance and Sexual Activity    Alcohol use: Yes     Alcohol/week: 0.0 - 1.0 standard drinks of alcohol     Comment: occas.    Drug use: Never    Sexual activity: Defer        Current Outpatient Medications:     aspirin 81 MG EC tablet, Take 1 tablet by mouth Daily., Disp: 90 tablet, Rfl: 1    carvedilol (COREG) 6.25 MG tablet, Take 1 tablet by mouth 2 (Two) Times a Day., Disp: 180 tablet, Rfl: 3    ezetimibe (ZETIA) 10 MG tablet, Take 1 tablet by mouth Daily., Disp: 90 tablet, Rfl: 3    hydrALAZINE (APRESOLINE) 10 MG tablet, Take 1 tablet by mouth 3 (Three) Times a Day., Disp: 270 tablet, Rfl: 1    losartan (COZAAR) 100 MG tablet, Take 1 tablet by mouth Daily., Disp: 90 tablet, Rfl: 3    pantoprazole (PROTONIX) 20 MG EC tablet, Take 1 tablet by mouth Daily., Disp: 90 tablet, Rfl: 1    tamsulosin (FLOMAX) 0.4 MG capsule 24 hr capsule, Take 2 capsules by mouth Daily., Disp: 180 capsule, Rfl: 4    diclofenac (VOLTAREN) 50 MG EC tablet, Take 1 tablet by mouth 2 (Two) Times a Day As Needed (joint pain)., Disp: 180 tablet, Rfl: 1   Family History   Problem Relation Age of Onset    Heart disease Mother     No Known Problems Father     Hepatitis Sister     Stroke Maternal Grandmother     Stroke Maternal Grandfather     Malig Hyperthermia Neg Hx           Vital Signs:   Vitals:    12/05/24 1317   BP: 140/70   Pulse: 104   Temp: 98.2 °F (36.8 °C)   SpO2: 97%   Weight: 96.2 kg (212 lb)       Review of Systems   Constitutional:  Negative for fatigue and fever.   HENT:  Negative for sore throat.    Eyes:  Negative for visual disturbance.   Respiratory:  Negative for cough,  chest tightness, shortness of breath and wheezing.    Cardiovascular:  Negative for chest pain, palpitations and leg swelling.   Gastrointestinal:  Negative for abdominal pain, diarrhea, nausea and vomiting.   Neurological:  Negative for dizziness, light-headedness and headaches.      Physical Exam  Vitals reviewed.   Constitutional:       Appearance: Normal appearance. He is well-developed.   HENT:      Head: Normocephalic and atraumatic.      Right Ear: External ear normal.      Left Ear: External ear normal.      Mouth/Throat:      Pharynx: No oropharyngeal exudate.   Eyes:      Conjunctiva/sclera: Conjunctivae normal.      Pupils: Pupils are equal, round, and reactive to light.   Cardiovascular:      Rate and Rhythm: Normal rate and regular rhythm.      Pulses: Normal pulses.      Heart sounds: Normal heart sounds. No murmur heard.     No friction rub. No gallop.   Pulmonary:      Effort: Pulmonary effort is normal.      Breath sounds: Normal breath sounds. No wheezing or rhonchi.   Abdominal:      General: Abdomen is flat. Bowel sounds are normal. There is no distension.      Palpations: Abdomen is soft. There is no mass.      Tenderness: There is no abdominal tenderness. There is no guarding or rebound.      Hernia: No hernia is present.   Musculoskeletal:         General: Normal range of motion.   Skin:     General: Skin is warm and dry.      Capillary Refill: Capillary refill takes less than 2 seconds.   Neurological:      General: No focal deficit present.      Mental Status: He is alert and oriented to person, place, and time.      Cranial Nerves: No cranial nerve deficit.   Psychiatric:         Mood and Affect: Mood and affect normal.         Behavior: Behavior normal.         Thought Content: Thought content normal.         Judgment: Judgment normal.        Result Review :   The following data was reviewed by: Stuart Alva MD on 12/05/2024:  [facility]          4/30/2024    15:56 5/31/2024    14:56  11/18/2024    11:09   CMP   Glucose 82  81  91    BUN 11  15  13    Creatinine 1.03  0.98  1.09    EGFR 82.6  87.7  77.2    Sodium 140  138  140    Potassium 3.9  4.0  4.6    Chloride 104  100  106    Calcium 9.6  9.3  9.2    Total Protein 7.3  7.1  6.8    Albumin 4.3  4.3  4.0    Globulin 3.0  2.8  2.8    Total Bilirubin 0.9  0.7  1.1    Alkaline Phosphatase 67  71  65    AST (SGOT) 22  16  21    ALT (SGPT) 23  18  22    Albumin/Globulin Ratio 1.4  1.5  1.4    BUN/Creatinine Ratio 10.7  15.3  11.9    Anion Gap 9.3  11.0  9.9      CBC          1/27/2024    09:08 4/30/2024    15:56 11/18/2024    11:09   CBC   WBC 7.70  8.34  8.11    RBC 5.32  5.16  5.28    Hemoglobin 15.0  15.2  15.3    Hematocrit 46.1  45.5  45.2    MCV 86.7  88.2  85.6    MCH 28.2  29.5  29.0    MCHC 32.5  33.4  33.8    RDW 13.1  13.0  13.0    Platelets 305  310  265      Lipid Panel          1/27/2024    09:08 11/18/2024    11:09   Lipid Panel   Total Cholesterol 232  253    Triglycerides 223  241    HDL Cholesterol 31  29    VLDL Cholesterol 42  46    LDL Cholesterol  159  178    LDL/HDL Ratio 5.05  6.06      TSH          1/27/2024    09:08 4/30/2024    15:56 11/18/2024    11:09   TSH   TSH 0.870  1.880  2.100      Most Recent A1C          11/18/2024    11:09   HGBA1C Most Recent   Hemoglobin A1C 5.70        PSA          1/27/2024    09:08   PSA   PSA 1.170                Assessment and Plan    Diagnoses and all orders for this visit:    1. Mixed hyperlipidemia (Primary)    2. Prediabetes    3. Arthralgia, unspecified joint  -     diclofenac (VOLTAREN) 50 MG EC tablet; Take 1 tablet by mouth 2 (Two) Times a Day As Needed (joint pain).  Dispense: 180 tablet; Refill: 1  -     DAPHNEY With / DsDNA, RNP, Sjogrens A / B, Smith  -     Rheumatoid Factor  -     Uric Acid  -     Cyclic Citrul Peptide Antibody, IgG / IgA    Pt will do lifestyle modification        Follow Up   Return in about 3 months (around 3/5/2025) for Recheck.  Patient was given  instructions and counseling regarding his condition or for health maintenance advice. Please see specific information pulled into the AVS if appropriate.

## 2024-12-06 LAB
CHROMATIN AB SERPL-ACNC: <10 IU/ML (ref 0–14)
URATE SERPL-MCNC: 4.8 MG/DL (ref 3.4–7)

## 2024-12-07 LAB
ANA SER QL: NEGATIVE
CCP IGA+IGG SERPL IA-ACNC: 7 UNITS (ref 0–19)

## 2024-12-30 RX ORDER — HYDROCHLOROTHIAZIDE 12.5 MG/1
12.5 TABLET ORAL DAILY
Qty: 60 TABLET | Refills: 5 | OUTPATIENT
Start: 2024-12-30

## 2025-05-29 ENCOUNTER — TELEPHONE (OUTPATIENT)
Dept: UROLOGY | Age: 60
End: 2025-05-29
Payer: COMMERCIAL

## 2025-05-29 NOTE — TELEPHONE ENCOUNTER
CALLED PT TO [facility] CX APPT FROM 6/3 W/FELICIANO/CHANA W/PT AND PT SAID THAT HE NO LONGER HAS INS AND [facility]/ANYTHING ELSE TO DO??

## (undated) DEVICE — COLON KIT: Brand: MEDLINE INDUSTRIES, INC.

## (undated) DEVICE — THE SINGLE USE ETRAP – POLYP TRAP IS USED FOR SUCTION RETRIEVAL OF ENDOSCOPICALLY REMOVED POLYPS.: Brand: ETRAP

## (undated) DEVICE — SUT VIC 0 CT1 CR8 27IN JJ41G

## (undated) DEVICE — SOL IRRG H2O PL/BG 1000ML STRL

## (undated) DEVICE — PK HEART OPN 40

## (undated) DEVICE — DRP SLUSH WARMR MACH CIR 44X44IN

## (undated) DEVICE — 28 FR RIGHT ANGLE – SOFT PVC CATHETER: Brand: PVC THORACIC CATHETERS

## (undated) DEVICE — Device

## (undated) DEVICE — CLAMP INSERT: Brand: STEALTH® CLAMP INSERT

## (undated) DEVICE — ST. SORBAVIEW ULTIMATE IJ SYSTEM A,C: Brand: CENTURION

## (undated) DEVICE — LOU OPEN HEART DR POLLOCK: Brand: MEDLINE INDUSTRIES, INC.

## (undated) DEVICE — CONN JET HYDRA H20 AUXILIARY DISP

## (undated) DEVICE — CATH DIAG IMPULSE PIG 5F 100CM

## (undated) DEVICE — PAD GRND E/S W/CORD SPLT A/

## (undated) DEVICE — OASIS DRAIN, DUAL, IN-LINE, ATS COMPATIBLE: Brand: OASIS

## (undated) DEVICE — ROTATING SURGICAL PUNCHES, 1 PER POUCH: Brand: A&E MEDICAL / ROTATING SURGICAL PUNCHES

## (undated) DEVICE — DRSNG SURESITE WNDW 2.38X2.75

## (undated) DEVICE — BLOWER/MISTER AXIOUS OPCAB W/TBG

## (undated) DEVICE — BLCK/BITE BLOX WO/DENTL/RIM W/STRAP 54F

## (undated) DEVICE — SOL ISO/ALC RUB 70PCT 4OZ

## (undated) DEVICE — HEMOCONCENTRATOR PERFUS LPS06

## (undated) DEVICE — SYS PERFUS SEP PLATLT W TIPS CUST

## (undated) DEVICE — SINGLE-USE BIOPSY FORCEPS: Brand: RADIAL JAW 4

## (undated) DEVICE — CANN ART EOPA 3D NV W/CONN 20F

## (undated) DEVICE — Device: Brand: DEFENDO AIR/WATER/SUCTION AND BIOPSY VALVE

## (undated) DEVICE — GLV SURG SIGNATURE ESSENTIAL PF LTX SZ7.5

## (undated) DEVICE — BIOPATCH™ ANTIMICROBIAL DRESSING WITH CHLORHEXIDINE GLUCONATE IS A HYDROPHILLIC POLYURETHANE ABSORPTIVE FOAM WITH CHLORHEXIDINE GLUCONATE (CHG) WHICH INHIBITS BACTERIAL GROWTH UNDER THE DRESSING. THE DRESSING IS INTENDED TO BE USED TO ABSORB EXUDATE, COVER A WOUND CAUSED BY VASCULAR AND NONVASCULAR PERCUTANEOUS MEDICAL DEVICES DURING SURGERY, AS WELL AS REDUCE LOCAL INFECTION AND COLONIZATION OF MICROORGANISMS.: Brand: BIOPATCH

## (undated) DEVICE — NEEDLE, QUINCKE, 20GX3.5": Brand: MEDLINE

## (undated) DEVICE — JACKSON-PRATT 100CC BULB RESERVOIR: Brand: CARDINAL HEALTH

## (undated) DEVICE — SYR LUERLOK 20CC BX/50

## (undated) DEVICE — CVR PROB 96IN LF STRL

## (undated) DEVICE — CORONARY ARTERY BYPASS GRAFT MARKERS, STAINLESS STEEL, DISTAL, WITHOUT HOLDER: Brand: ANASTOMARK CORONARY ARTERY BYPASS GRAFT MARKERS, STAINLESS STEEL, DISTAL

## (undated) DEVICE — SNAR POLYP CAPTIFLEX XS/OVL 11X2.4MM 240CM 1P/U

## (undated) DEVICE — VASOVIEW HEMOPRO: Brand: VASOVIEW HEMOPRO

## (undated) DEVICE — GLV SURG BIOGEL SENSR LTX PF SZ7.5

## (undated) DEVICE — GW EMR FIX EXCHG J STD .035 3MM 260CM

## (undated) DEVICE — KT MANIFLD CARDIAC

## (undated) DEVICE — MEDICINE CUP, GRADUATED, STER: Brand: MEDLINE

## (undated) DEVICE — SUT SILK 0 CT1 CR8 18IN C021D

## (undated) DEVICE — GLV SURG BIOGEL M LTX PF 7 1/2

## (undated) DEVICE — 12 FOOT DISPOSABLE EXTENSION CABLE WITH SAFE CONNECT / SCREW-DOWN

## (undated) DEVICE — ADHS SKIN DERMABOND TOP ADVANCED

## (undated) DEVICE — CATH DIAG IMPULSE FR5 5F 100CM

## (undated) DEVICE — PK CATH CARD 40

## (undated) DEVICE — LINER SURG CANSTR SXN S/RIGD 1500CC

## (undated) DEVICE — SENSR CERBRL O2 PK/2

## (undated) DEVICE — SOL IRR NACL 0.9PCT BT 1000ML

## (undated) DEVICE — SUT PROLN MO.5 7/0 DBLARM BV175 6 2X30 BX/12

## (undated) DEVICE — TOWEL,OR,DSP,ST,WHITE,DLX,4/PK,20PK/CS: Brand: MEDLINE

## (undated) DEVICE — PK ATS CUST W CARDIOTOMY RESEVOIR

## (undated) DEVICE — HARMONIC SYNERGY DISSECTING HOOK WITH TORQUE WRENCH. FOR USE WITH BLUE HAND PIECE ONLY: Brand: HARMONIC SYNERGY

## (undated) DEVICE — PK PERFUS CUST W/CARDIOPLEGIA

## (undated) DEVICE — TBG INSUFFLATION LUER LOCK: Brand: MEDLINE INDUSTRIES, INC.

## (undated) DEVICE — GLIDESHEATH SLENDER STAINLESS STEEL KIT: Brand: GLIDESHEATH SLENDER

## (undated) DEVICE — SOLIDIFIER LIQLOC PLS 1500CC BT

## (undated) DEVICE — SOL IRR H2O BTL 1000ML STRL

## (undated) DEVICE — 28 FR STRAIGHT – SOFT PVC CATHETER: Brand: PVC THORACIC CATHETERS

## (undated) DEVICE — CATH DIAG IMPULSE FL3.5 5F 100CM

## (undated) DEVICE — DRSNG WND GEL FIBR OPTICELL AG PLS W/SLV LF 4X5IN  STRL